# Patient Record
Sex: MALE | Race: WHITE | NOT HISPANIC OR LATINO | Employment: FULL TIME | URBAN - METROPOLITAN AREA
[De-identification: names, ages, dates, MRNs, and addresses within clinical notes are randomized per-mention and may not be internally consistent; named-entity substitution may affect disease eponyms.]

---

## 2011-01-11 LAB — RETINOPATHY: NEGATIVE

## 2017-02-14 ENCOUNTER — OFFICE VISIT (OUTPATIENT)
Dept: FAMILY MEDICINE | Facility: CLINIC | Age: 47
End: 2017-02-14
Payer: COMMERCIAL

## 2017-02-14 VITALS
DIASTOLIC BLOOD PRESSURE: 70 MMHG | TEMPERATURE: 98 F | BODY MASS INDEX: 34.27 KG/M2 | HEART RATE: 90 BPM | WEIGHT: 253 LBS | HEIGHT: 72 IN | SYSTOLIC BLOOD PRESSURE: 122 MMHG | OXYGEN SATURATION: 97 %

## 2017-02-14 DIAGNOSIS — E11.9 TYPE 2 DIABETES MELLITUS WITHOUT COMPLICATION, WITHOUT LONG-TERM CURRENT USE OF INSULIN (H): Primary | ICD-10-CM

## 2017-02-14 DIAGNOSIS — E78.5 HYPERLIPIDEMIA LDL GOAL <100: ICD-10-CM

## 2017-02-14 DIAGNOSIS — J20.9 ACUTE BRONCHITIS WITH SYMPTOMS > 10 DAYS: ICD-10-CM

## 2017-02-14 LAB — HBA1C MFR BLD: 7.6 % (ref 4.3–6)

## 2017-02-14 PROCEDURE — 36415 COLL VENOUS BLD VENIPUNCTURE: CPT | Performed by: NURSE PRACTITIONER

## 2017-02-14 PROCEDURE — 83036 HEMOGLOBIN GLYCOSYLATED A1C: CPT | Performed by: NURSE PRACTITIONER

## 2017-02-14 PROCEDURE — 99214 OFFICE O/P EST MOD 30 MIN: CPT | Performed by: NURSE PRACTITIONER

## 2017-02-14 RX ORDER — SIMVASTATIN 10 MG
10 TABLET ORAL AT BEDTIME
Qty: 90 TABLET | Refills: 3 | Status: SHIPPED | OUTPATIENT
Start: 2017-02-14 | End: 2018-02-19

## 2017-02-14 RX ORDER — AZITHROMYCIN 250 MG/1
TABLET, FILM COATED ORAL
Qty: 6 TABLET | Refills: 0 | Status: SHIPPED | OUTPATIENT
Start: 2017-02-14 | End: 2017-04-30

## 2017-02-14 NOTE — NURSING NOTE
Chief Complaint   Patient presents with     Diabetes     Cough     f/u       Initial /70  Pulse 101  Temp 98  F (36.7  C) (Tympanic)  Ht 6' (1.829 m)  Wt 253 lb (114.8 kg)  SpO2 97%  BMI 34.31 kg/m2 Estimated body mass index is 34.31 kg/(m^2) as calculated from the following:    Height as of this encounter: 6' (1.829 m).    Weight as of this encounter: 253 lb (114.8 kg).  Medication Reconciliation: complete

## 2017-02-14 NOTE — MR AVS SNAPSHOT
After Visit Summary   2/14/2017    Osmar Angel    MRN: [id number]           Patient Information     Date Of Birth          1970        Visit Information        Provider Department      2/14/2017 11:00 AM Cinthya Johnson APRN CNP [facility]        Today's Diagnoses     Type 2 diabetes mellitus without complication, without long-term current use of insulin (H)    -  1    Acute bronchitis with symptoms > 10 days        Hyperlipidemia LDL goal <100          Care Instructions    Labs-today we will notify you with those results    Antibiotics sent to the pharmacy    Follow up if symptoms do not improve or worsen.          Follow-ups after your visit        Who to contact     If you have questions or need follow up information about today's clinic visit or your schedule please contact [facility] directly at [phone].  Normal or non-critical lab and imaging results will be communicated to you by MyChart, letter or phone within 4 business days after the clinic has received the results. If you do not hear from us within 7 days, please contact the clinic through MyChart or phone. If you have a critical or abnormal lab result, we will notify you by phone as soon as possible.  Submit refill requests through zanda or call your pharmacy and they will forward the refill request to us. Please allow 3 business days for your refill to be completed.          Additional Information About Your Visit        MyChart Information     zanda gives you secure access to your electronic health record. If you see a primary care provider, you can also send messages to your care team and make appointments. If you have questions, please call your primary care clinic.  If you do not have a primary care provider, please call [phone] and they will assist you.        Care EveryWhere ID     This is your Care EveryWhere ID. This could be used by other organizations to access  your [place] medical records  [id number]        Your Vitals Were     Pulse Temperature Height Pulse Oximetry BMI (Body Mass Index)       90 98  F (36.7  C) (Tympanic) 6' (1.829 m) 97% 34.31 kg/m2        Blood Pressure from Last 3 Encounters:   02/14/17 122/70   11/28/16 130/80   04/27/16 (!) 141/94    Weight from Last 3 Encounters:   02/14/17 253 lb (114.8 kg)   11/28/16 256 lb (116.1 kg)   10/06/15 267 lb (121.1 kg)              We Performed the Following     Hemoglobin A1c          Today's Medication Changes          These changes are accurate as of: 2/14/17 11:43 AM.  If you have any questions, ask your nurse or doctor.               Start taking these medicines.        Dose/Directions    azithromycin 250 MG tablet   Commonly known as:  ZITHROMAX   Used for:  Acute bronchitis with symptoms > 10 days   Started by:  Cinthya Johnson APRN CNP        Two tablets first day, then one tablet daily for four days.   Quantity:  6 tablet   Refills:  0            Where to get your medicines      These medications were sent to [id number] [address] [place] [address]  [address] [facility] [address]    Hours:  Closed 10-16-08 business to [facility] Phone:  [phone]     azithromycin 250 MG tablet    simvastatin 10 MG tablet                Primary Care Provider Office Phone # Fax #    NIURKA Villarreal [phone]       [facility] [address] [address]        Thank you!     Thank you for choosing [facility]  for your care. Our goal is always to provide you with excellent care. Hearing back from our patients is one way we can continue to improve our services. Please take a few minutes to complete the written survey that you may receive in the mail after your visit with us. Thank you!             Your Updated Medication List - Protect others around you: Learn how to safely use, store and throw away your  medicines at www.disposemymeds.org.          This list is accurate as of: 2/14/17 11:43 AM.  Always use your most recent med list.                   Brand Name Dispense Instructions for use    aspirin 81 MG tablet      Take 1 tablet by mouth daily.       azithromycin 250 MG tablet    ZITHROMAX    6 tablet    Two tablets first day, then one tablet daily for four days.       blood glucose monitoring lancets     102 Box    1 each daily Use to test blood sugar 1 times daily or as directed.       blood glucose monitoring test strip    no brand specified    3 Box    Use to test blood sugar 3 times daily or as directed. Compatible with Corina monitoring device       CINNAMON PO          lisinopril 10 MG tablet    PRINIVIL/ZESTRIL    90 tablet    Take 1 tablet (10 mg) by mouth daily       metFORMIN 1000 MG tablet    GLUCOPHAGE    180 tablet    Take 1 tablet (1,000 mg) by mouth 2 times daily (with meals)       NAPROXEN SODIUM PO      Take 100 mg by mouth       OMEGA-3 FISH OIL PO          order for DME      Equipment being ordered: CPAP Osmar Angle  received a [employer] DreamStation Auto CPAP Auto. Pressures were set at Auto 11 - 15 cm H2O.       simvastatin 10 MG tablet    ZOCOR    90 tablet    Take 1 tablet (10 mg) by mouth At Bedtime       triamcinolone 0.1 % cream    KENALOG    80 g    Apply topically 2 times daily Apply sparingly to affected area three times daily as needed

## 2017-02-14 NOTE — PROGRESS NOTES
SUBJECTIVE:                                                    Osmar Angel is a 46 year old male who presents to clinic today for the following health issues:    Diabetes Follow-up     Patient is checking blood sugars: once daily.  Results are as follows:         118-150 usually checking in the am (150) During day is lower     Diabetic concerns: None     Symptoms of hypoglycemia (low blood sugar): weak and lightheaded      Paresthesias (numbness or burning in feet) or sores: numbness on right toe- hx of accident      Date of last diabetic eye exam: last 6 mo        Amount of exercise or physical activity: 2-3 days/week for an average of 15-30 minutes    Problems taking medications regularly: No    Medication side effects: none  Diet: carbohydrate counting    Has been working on improving diet and increasing physical activity    RESPIRATORY SYMPTOMS      Duration: 3 months     Description  cough - low grade temp yesterday 99.6    Severity: moderate    Accompanying signs and symptoms: SOB with activity      History (predisposing factors):  None - hx of tobacco 12 years     Precipitating or alleviating factors: None    Therapies tried and outcome: musinex      Cough ongoing  Afebrile    Problem list and histories reviewed & adjusted, as indicated.  Additional history: as documented    Patient Active Problem List   Diagnosis     Profound impairment, one eye, impairment level not further specified     Pain in joint, upper arm     External hemorrhoids     Hypertension goal BP (blood pressure) < 140/90     Type 2 diabetes mellitus without complications (H)     Hyperlipidemia LDL goal <100     Obesity, Class II, BMI 35-39.9, with comorbidity (H)     VERONA (obstructive sleep apnea)-AHI 71     Past Surgical History   Procedure Laterality Date     Surgical history of -        right knee arthroscopy     C repair ing hernia, infant,reduc       bilateral     Surgical history of -        umbilical hernia repair      Rotator cuff repair rt/lt       right arthroscopic     Vasectomy         Social History   Substance Use Topics     Smoking status: Former Smoker     Quit date: 2/14/2005     Smokeless tobacco: Never Used     Alcohol use 0.0 oz/week     0 Standard drinks or equivalent per week      Comment: rarely     Family History   Problem Relation Age of Onset     DIABETES Maternal Grandfather      C.A.D. Paternal Grandmother      CMartineA.D. Paternal Grandfather      Cardiovascular Father      a. fib     CEREBROVASCULAR DISEASE Father      HEART DISEASE Father      pacemaker     Asthma No family hx of      Hypertension No family hx of      Breast Cancer No family hx of      Cancer - colorectal No family hx of      Prostate Cancer No family hx of          Current Outpatient Prescriptions   Medication Sig Dispense Refill     simvastatin (ZOCOR) 10 MG tablet Take 1 tablet (10 mg) by mouth At Bedtime 30 tablet 3     metFORMIN (GLUCOPHAGE) 1000 MG tablet Take 1 tablet (1,000 mg) by mouth 2 times daily (with meals) 180 tablet 1     blood glucose monitoring (NO BRAND SPECIFIED) test strip Use to test blood sugar 3 times daily or as directed. Compatible with Corina monitoring device 3 Box 3     lisinopril (PRINIVIL,ZESTRIL) 10 MG tablet Take 1 tablet (10 mg) by mouth daily 90 tablet 3     blood glucose monitoring (ACCU-CHEK FASTCLIX) lancets 1 each daily Use to test blood sugar 1 times daily or as directed. 102 Box 5     triamcinolone (KENALOG) 0.1 % cream Apply topically 2 times daily Apply sparingly to affected area three times daily as needed 80 g 3     order for DME Equipment being ordered: CPAP Osmar Angel  received a [employer] DreamStation Auto CPAP Auto. Pressures were set at Auto 11 - 15 cm H2O.       CINNAMON PO        Omega-3 Fatty Acids (OMEGA-3 FISH OIL PO)        NAPROXEN SODIUM PO Take 100 mg by mouth        aspirin 81 MG tablet Take 1 tablet by mouth daily.       Allergies   Allergen Reactions     Wellbutrin  [Bupropion Hcl] Swelling     Problem list, Medication list, Allergies, and Medical/Social/Surgical histories reviewed in [facility] and updated as appropriate.    ROS:  Constitutional, HEENT, cardiovascular, pulmonary, gi and gu systems are negative, except as otherwise noted.    OBJECTIVE:                                                    /70  Pulse 101  Temp 98  F (36.7  C) (Tympanic)  Ht 6' (1.829 m)  Wt 253 lb (114.8 kg)  SpO2 97%  BMI 34.31 kg/m2  Body mass index is 34.31 kg/(m^2).  GENERAL: healthy, alert and no distress  HENT: ear canals and TM's normal, nose and mouth without ulcers or lesions  NECK: no adenopathy, no asymmetry, masses, or scars and thyroid normal to palpation  RESP: lungs clear to auscultation - no rales, rhonchi or wheezes  CV: regular rate and rhythm, normal S1 S2, no S3 or S4, no murmur, click or rub  ABDOMEN: soft, nontender, no hepatosplenomegaly, no masses and bowel sounds normal  PSYCH: mentation appears normal, affect normal/bright    Diagnostic Test Results:  Results for orders placed or performed in visit on 02/14/17 (from the past 24 hour(s))   Hemoglobin A1c   Result Value Ref Range    Hemoglobin A1C 7.6 (H) 4.3 - 6.0 %        ASSESSMENT/PLAN:                                                      1. Type 2 diabetes mellitus without complication, without long-term current use of insulin (H)  Controlled-continue to work on lifestyle modifications for better control.  - Hemoglobin A1c  - simvastatin (ZOCOR) 10 MG tablet; Take 1 tablet (10 mg) by mouth At Bedtime  Dispense: 90 tablet; Refill: 3    2. Acute bronchitis with symptoms > 10 days  Symptomatic care and follow up discussed  - azithromycin (ZITHROMAX) 250 MG tablet; Two tablets first day, then one tablet daily for four days.  Dispense: 6 tablet; Refill: 0    3. Hyperlipidemia LDL goal <100  Controlled-recheck labs in 3-6 months  - simvastatin (ZOCOR) 10 MG tablet; Take 1 tablet (10 mg) by mouth At Bedtime  Dispense: 90  tablet; Refill: 3    Home care instructions were reviewed with the patient. The risks, benefits and treatment options of prescribed medications or other treatments have been discussed with the patient. The patient verbalized their understanding and should call or follow up if no improvement or if they develop further problems.      Patient Instructions   Labs-today we will notify you with those results    Antibiotics sent to the pharmacy    Follow up if symptoms do not improve or worsen.        NIURKA Escobar [facility]

## 2017-02-14 NOTE — PATIENT INSTRUCTIONS
Labs-today we will notify you with those results    Antibiotics sent to the pharmacy    Follow up if symptoms do not improve or worsen.

## 2017-02-18 ENCOUNTER — MYC MEDICAL ADVICE (OUTPATIENT)
Dept: FAMILY MEDICINE | Facility: CLINIC | Age: 47
End: 2017-02-18

## 2017-03-06 ENCOUNTER — TELEPHONE (OUTPATIENT)
Dept: SLEEP MEDICINE | Facility: CLINIC | Age: 47
End: 2017-03-06

## 2017-03-06 NOTE — TELEPHONE ENCOUNTER
SUBJECTIVE:  Chief Complaint   Patient presents with     Sleep Problem     DOT usage download request      OBJECTIVE:  Osmar called stating he saw [facility]. [facility] for is [place] DOT physical.  Osmar states they sent us a from requiring signature and subsequent compliance with recommended treatment program.     ASSESSMENT/PLAN:  Form not found. I called Dr. Carreon's office at [phone] and requested they fax form again.  Osmar currently uses a Tanya [employer] Dreamstation. I was able to print download for last 30 days.   Last office visit 10/06/2015 **Osmar notified that he may need an annual follow up to get the information they are needing.  Will give to provider for review.

## 2017-03-07 NOTE — TELEPHONE ENCOUNTER
Spoke with patient and informed him that I faxed his DOT download and compliance to [facility] at [phone]. Last office visit was 10/2015 and provider would like to see patient annually as he is a DOT patient. I told patient I would send him a reminder card to have him schedule an annual DOT CPAP follow up to get up to date.    Poly Ayon CMA

## 2017-04-30 ENCOUNTER — OFFICE VISIT (OUTPATIENT)
Dept: URGENT CARE | Facility: URGENT CARE | Age: 47
End: 2017-04-30
Payer: COMMERCIAL

## 2017-04-30 VITALS
BODY MASS INDEX: 34.46 KG/M2 | HEART RATE: 100 BPM | OXYGEN SATURATION: 98 % | HEIGHT: 73 IN | WEIGHT: 260 LBS | DIASTOLIC BLOOD PRESSURE: 82 MMHG | SYSTOLIC BLOOD PRESSURE: 117 MMHG | TEMPERATURE: 97.3 F

## 2017-04-30 DIAGNOSIS — J20.9 ACUTE BRONCHITIS WITH SYMPTOMS > 10 DAYS: Primary | ICD-10-CM

## 2017-04-30 PROCEDURE — 99213 OFFICE O/P EST LOW 20 MIN: CPT | Performed by: NURSE PRACTITIONER

## 2017-04-30 RX ORDER — BENZONATATE 200 MG/1
200 CAPSULE ORAL 3 TIMES DAILY PRN
Qty: 30 CAPSULE | Refills: 0 | Status: SHIPPED | OUTPATIENT
Start: 2017-04-30 | End: 2017-08-30

## 2017-04-30 NOTE — PROGRESS NOTES
SUBJECTIVE:                                                    Osmar Angel is a 46 year old male who presents to clinic today for the following health issues:      ENT Symptoms             Symptoms: cc Present Absent Comment   Fever/Chills   x    Fatigue  x  No energy   Muscle Aches   x    Eye Irritation  x  Some discharge   Sneezing  x     Nasal Joao/Drg  x  Greenish color   Sinus Pressure/Pain   x    Loss of smell   x    Dental pain   x    Sore Throat  x  slight   Swollen Glands   x    Ear Pain/Fullness   x    Cough x      Wheeze  x     Chest Pain   x    Shortness of breath   x    Rash   x    Other         Symptom duration:  1 week, getting worse last 3 days   Symptom severity:     Treatments tried:  Mucinex, Dayquil, Nyquil PRN   Contacts:  none             Problem list and histories reviewed & adjusted, as indicated.  Additional history: as documented    Patient Active Problem List   Diagnosis     Profound impairment, one eye, impairment level not further specified     Pain in joint, upper arm     External hemorrhoids     Hypertension goal BP (blood pressure) < 140/90     Type 2 diabetes mellitus without complications (H)     Hyperlipidemia LDL goal <100     Obesity, Class II, BMI 35-39.9, with comorbidity (H)     VERONA (obstructive sleep apnea)-AHI 71     Past Surgical History:   Procedure Laterality Date     C REPAIR ING HERNIA, INFANT,REDUC      bilateral     ROTATOR CUFF REPAIR RT/LT      right arthroscopic     SURGICAL HISTORY OF -       right knee arthroscopy     SURGICAL HISTORY OF -       umbilical hernia repair     VASECTOMY         Social History   Substance Use Topics     Smoking status: Former Smoker     Quit date: 2005     Smokeless tobacco: Never Used     Alcohol use 0.0 oz/week     0 Standard drinks or equivalent per week      Comment: rarely     Family History   Problem Relation Age of Onset     DIABETES Maternal Grandfather      JULIANNA. Paternal Grandmother      JULIANNA. Paternal  Grandfather      Cardiovascular Father      a. fib     CEREBROVASCULAR DISEASE Father      HEART DISEASE Father      pacemaker     Asthma No family hx of      Hypertension No family hx of      Breast Cancer No family hx of      Cancer - colorectal No family hx of      Prostate Cancer No family hx of          Current Outpatient Prescriptions   Medication Sig Dispense Refill     amoxicillin-clavulanate (AUGMENTIN) 875-125 MG per tablet Take 1 tablet by mouth 2 times daily for 10 days 20 tablet 0     benzonatate (TESSALON) 200 MG capsule Take 1 capsule (200 mg) by mouth 3 times daily as needed for cough 30 capsule 0     simvastatin (ZOCOR) 10 MG tablet Take 1 tablet (10 mg) by mouth At Bedtime 90 tablet 3     metFORMIN (GLUCOPHAGE) 1000 MG tablet Take 1 tablet (1,000 mg) by mouth 2 times daily (with meals) 180 tablet 1     blood glucose monitoring (NO BRAND SPECIFIED) test strip Use to test blood sugar 3 times daily or as directed. Compatible with Corina monitoring device 3 Box 3     lisinopril (PRINIVIL,ZESTRIL) 10 MG tablet Take 1 tablet (10 mg) by mouth daily 90 tablet 3     blood glucose monitoring (ACCU-CHEK FASTCLIX) lancets 1 each daily Use to test blood sugar 1 times daily or as directed. 102 Box 5     triamcinolone (KENALOG) 0.1 % cream Apply topically 2 times daily Apply sparingly to affected area three times daily as needed 80 g 3     order for DME Equipment being ordered: CPAP Osmar Angel  received a [employer]anya [employer] DreamStation Auto CPAP Auto. Pressures were set at Auto 11 - 15 cm H2O.       CINNAMON PO        Omega-3 Fatty Acids (OMEGA-3 FISH OIL PO)        NAPROXEN SODIUM PO Take 100 mg by mouth        aspirin 81 MG tablet Take 1 tablet by mouth daily.       Allergies   Allergen Reactions     Wellbutrin [Bupropion Hcl] Swelling     Labs reviewed in EPIC    Reviewed and updated as needed this visit by clinical staff  Tobacco  Allergies  Meds  Problems  Med Hx  Surg Hx  Fam Hx  Soc Hx       "  Reviewed and updated as needed this visit by Provider  Allergies  Meds  Problems         ROS:  Constitutional, HEENT, cardiovascular, pulmonary, GI, , musculoskeletal, neuro, skin, endocrine and psych systems are negative, except as otherwise noted.    OBJECTIVE:                                                    /82  Pulse 100  Temp 97.3  F (36.3  C) (Tympanic)  Ht 6' 1\" (1.854 m)  Wt 260 lb (117.9 kg)  SpO2 98%  BMI 34.3 kg/m2  Body mass index is 34.3 kg/(m^2).  GENERAL: healthy, alert and no distress, nontoxic in appearance  EYES: Eyes grossly normal to inspection, PERRL and conjunctivae and sclerae normal  HENT: ear canals and TM's normal, nose and mouth without ulcers or lesions  NECK: no adenopathy, supple with full ROM  RESP: lungs clear to auscultation - no rales, rhonchi or wheezes but very deep harsh cough  CV: regular rate and rhythm, normal S1 S2, no S3 or S4, no murmur, click or rub, no peripheral edema   ABDOMEN: soft, nontender  MS: no gross musculoskeletal defects noted, no edema  No rash    Diagnostic Test Results:  No results found for this or any previous visit (from the past 24 hour(s)).     ASSESSMENT/PLAN:                                                      Problem List Items Addressed This Visit     None      Visit Diagnoses     Acute bronchitis with symptoms > 10 days    -  Primary    Relevant Medications    amoxicillin-clavulanate (AUGMENTIN) 875-125 MG per tablet    benzonatate (TESSALON) 200 MG capsule               Patient Instructions   Increase rest and fluids. Tylenol and/or Ibuprofen for comfort. Cool mist vaporizer. If your symptoms worsen or do not resolve follow up with your primary care provider in 2 weeks and sooner if needed.        Indications for emergent return to emergency department discussed with patient, who verbalized good understanding and agreement.  Patient understands the limitations of today's evaluation.           Bronchitis, Antibiotic " Treatment (Adult)    Bronchitis is an infection of the air passages (bronchial tubes) in your lungs. It often occurs when you have a cold. This illness is contagious during the first few days and is spread through the air by coughing and sneezing, or by direct contact (touching the sick person and then touching your own eyes, nose, or mouth).  Symptoms of bronchitis include cough with mucus (phlegm) and low-grade fever. Bronchitis usually lasts 7 to 14 days. Mild cases can be treated with simple home remedies. More severe infection is treated with an antibiotic.  Home care  Follow these guidelines when caring for yourself at home:    If your symptoms are severe, rest at home for the first 2 to 3 days. When you go back to your usual activities, don't let yourself get too tired.    Do not smoke. Also avoid being exposed to secondhand smoke.    You may use over-the-counter medicines to control fever or pain, unless another medicine was prescribed. (Note: If you have chronic liver or kidney disease or have ever had a stomach ulcer or gastrointestinal bleeding, talk with your healthcare provider before using these medicines. Also talk to your provider if you are taking medicine to prevent blood clots.) Aspirin should never be given to anyone younger than 18 years of age who is ill with a viral infection or fever. It may cause severe liver or brain damage.    Your appetite may be poor, so a light diet is fine. Avoid dehydration by drinking 6 to 8 glasses of fluids per day (such as water, soft drinks, sports drinks, juices, tea, or soup). Extra fluids will help loosen secretions in the nose and lungs.    Over-the-counter cough, cold, and sore-throat medicines will not shorten the length of the illness, but they may be helpful to reduce symptoms. (Note: Do not use decongestants if you have high blood pressure.)    Finish all antibiotic medicine. Do this even if you are feeling better after only a few days.  Follow-up  care  Follow up with your healthcare provider, or as advised. If you had an X-ray or ECG (electrocardiogram), a specialist will review it. You will be notified of any new findings that may affect your care.  Note: If you are age 65 or older, or if you have a chronic lung disease or condition that affects your immune system, or you smoke, talk to your healthcare provider about having pneumococcal vaccinations and a yearly influenza vaccination (flu shot).  When to seek medical advice  Call your healthcare provider right away if any of these occur:    Fever of 100.4 F (38 C) or higher    Coughing up increased amounts of colored sputum    Weakness, drowsiness, headache, facial pain, ear pain, or a stiff neck   Call 911, or get immediate medical care  Contact emergency services right away if any of these occur.    Coughing up blood    Worsening weakness, drowsiness, headache, or stiff neck    Trouble breathing, wheezing, or pain with breathing    [id number] [email or web address]. [address] [place] [address], [place] [address]. All rights reserved. This information is not intended as a substitute for professional medical care. Always follow your healthcare professional's instructions.            NIURKA Phipps [facility]

## 2017-04-30 NOTE — NURSING NOTE
"Chief Complaint   Patient presents with     Cough       Initial /82  Pulse 100  Temp 97.3  F (36.3  C) (Tympanic)  Ht 6' 1\" (1.854 m)  Wt 260 lb (117.9 kg)  SpO2 98%  BMI 34.3 kg/m2 Estimated body mass index is 34.3 kg/(m^2) as calculated from the following:    Height as of this encounter: 6' 1\" (1.854 m).    Weight as of this encounter: 260 lb (117.9 kg).  Medication Reconciliation: complete   NORA Madrigal      "

## 2017-04-30 NOTE — MR AVS SNAPSHOT
After Visit Summary   4/30/2017    Osmar Angel    MRN: [id number]           Patient Information     Date Of Birth          1970        Visit Information        Provider Department      4/30/2017 10:40 AM Jessi Chirinos APRN [facility]        Today's Diagnoses     Acute bronchitis with symptoms > 10 days    -  1      Care Instructions    Increase rest and fluids. Tylenol and/or Ibuprofen for comfort. Cool mist vaporizer. If your symptoms worsen or do not resolve follow up with your primary care provider in 2 weeks and sooner if needed.        Indications for emergent return to emergency department discussed with patient, who verbalized good understanding and agreement.  Patient understands the limitations of today's evaluation.           Bronchitis, Antibiotic Treatment (Adult)    Bronchitis is an infection of the air passages (bronchial tubes) in your lungs. It often occurs when you have a cold. This illness is contagious during the first few days and is spread through the air by coughing and sneezing, or by direct contact (touching the sick person and then touching your own eyes, nose, or mouth).  Symptoms of bronchitis include cough with mucus (phlegm) and low-grade fever. Bronchitis usually lasts 7 to 14 days. Mild cases can be treated with simple home remedies. More severe infection is treated with an antibiotic.  Home care  Follow these guidelines when caring for yourself at home:    If your symptoms are severe, rest at home for the first 2 to 3 days. When you go back to your usual activities, don't let yourself get too tired.    Do not smoke. Also avoid being exposed to secondhand smoke.    You may use over-the-counter medicines to control fever or pain, unless another medicine was prescribed. (Note: If you have chronic liver or kidney disease or have ever had a stomach ulcer or gastrointestinal bleeding, talk with your healthcare provider  before using these medicines. Also talk to your provider if you are taking medicine to prevent blood clots.) Aspirin should never be given to anyone younger than 18 years of age who is ill with a viral infection or fever. It may cause severe liver or brain damage.    Your appetite may be poor, so a light diet is fine. Avoid dehydration by drinking 6 to 8 glasses of fluids per day (such as water, soft drinks, sports drinks, juices, tea, or soup). Extra fluids will help loosen secretions in the nose and lungs.    Over-the-counter cough, cold, and sore-throat medicines will not shorten the length of the illness, but they may be helpful to reduce symptoms. (Note: Do not use decongestants if you have high blood pressure.)    Finish all antibiotic medicine. Do this even if you are feeling better after only a few days.  Follow-up care  Follow up with your healthcare provider, or as advised. If you had an X-ray or ECG (electrocardiogram), a specialist will review it. You will be notified of any new findings that may affect your care.  Note: If you are age 65 or older, or if you have a chronic lung disease or condition that affects your immune system, or you smoke, talk to your healthcare provider about having pneumococcal vaccinations and a yearly influenza vaccination (flu shot).  When to seek medical advice  Call your healthcare provider right away if any of these occur:    Fever of 100.4 F (38 C) or higher    Coughing up increased amounts of colored sputum    Weakness, drowsiness, headache, facial pain, ear pain, or a stiff neck   Call 911, or get immediate medical care  Contact emergency services right away if any of these occur.    Coughing up blood    Worsening weakness, drowsiness, headache, or stiff neck    Trouble breathing, wheezing, or pain with breathing    1540-8925 [employer]. [address] [place] [address], [place] [address]. All rights reserved. This information is not intended as a substitute for  "professional medical care. Always follow your healthcare professional's instructions.              Follow-ups after your visit        Follow-up notes from your care team     See patient instructions section of the AVS Return in about 2 weeks (around 5/14/2017), or if symptoms worsen or fail to improve, for Follow up with your primary care provider.      Who to contact     If you have questions or need follow up information about today's clinic visit or your schedule please contact [facility] directly at [phone].  Normal or non-critical lab and imaging results will be communicated to you by [employer], letter or phone within 4 business days after the clinic has received the results. If you do not hear from us within 7 days, please contact the clinic through [employer] or phone. If you have a critical or abnormal lab result, we will notify you by phone as soon as possible.  Submit refill requests through [employer] or call your pharmacy and they will forward the refill request to us. Please allow 3 business days for your refill to be completed.          Additional Information About Your Visit        [employer] Information     [employer] gives you secure access to your electronic health record. If you see a primary care provider, you can also send messages to your care team and make appointments. If you have questions, please call your primary care clinic.  If you do not have a primary care provider, please call [phone] and they will assist you.        Care EveryWhere ID     This is your Care EveryWhere ID. This could be used by other organizations to access your [facility] medical records  [id number]        Your Vitals Were     Pulse Temperature Height Pulse Oximetry BMI (Body Mass Index)       100 97.3  F (36.3  C) (Tympanic) 6' 1\" (1.854 m) 98% 34.3 kg/m2        Blood Pressure from Last 3 Encounters:   04/30/17 117/82   02/14/17 122/70   11/28/16 130/80    Weight from Last 3 Encounters: "   04/30/17 260 lb (117.9 kg)   02/14/17 253 lb (114.8 kg)   11/28/16 256 lb (116.1 kg)              Today, you had the following     No orders found for display         Today's Medication Changes          These changes are accurate as of: 4/30/17  1:24 PM.  If you have any questions, ask your nurse or doctor.               Start taking these medicines.        Dose/Directions    amoxicillin-clavulanate 875-125 MG per tablet   Commonly known as:  AUGMENTIN   Used for:  Acute bronchitis with symptoms > 10 days   Started by:  Jessi Chirinos APRN CNP        Dose:  1 tablet   Take 1 tablet by mouth 2 times daily for 10 days   Quantity:  20 tablet   Refills:  0       benzonatate 200 MG capsule   Commonly known as:  TESSALON   Used for:  Acute bronchitis with symptoms > 10 days   Started by:  Jessi Chirinos APRN CNP        Dose:  200 mg   Take 1 capsule (200 mg) by mouth 3 times daily as needed for cough   Quantity:  30 capsule   Refills:  0            Where to get your medicines      These medications were sent to [id number] [address] [facility]  [address] [facility] [address]    Hours:  Closed 10-16-08 business to [facility] Phone:  [phone]     amoxicillin-clavulanate 875-125 MG per tablet    benzonatate 200 MG capsule                Primary Care Provider Office Phone # Fax #    Cinthya NIURKA Franklin [phone]       [facility] [address] [address]        Thank you!     Thank you for choosing [facility]  for your care. Our goal is always to provide you with excellent care. Hearing back from our patients is one way we can continue to improve our services. Please take a few minutes to complete the written survey that you may receive in the mail after your visit with us. Thank you!             Your Updated Medication List - Protect others around you: Learn how to safely use,  store and throw away your medicines at www.disposemymeds.org.          This list is accurate as of: 4/30/17  1:24 PM.  Always use your most recent med list.                   Brand Name Dispense Instructions for use    amoxicillin-clavulanate 875-125 MG per tablet    AUGMENTIN    20 tablet    Take 1 tablet by mouth 2 times daily for 10 days       aspirin 81 MG tablet      Take 1 tablet by mouth daily.       benzonatate 200 MG capsule    TESSALON    30 capsule    Take 1 capsule (200 mg) by mouth 3 times daily as needed for cough       blood glucose monitoring lancets     102 Box    1 each daily Use to test blood sugar 1 times daily or as directed.       blood glucose monitoring test strip    no brand specified    3 Box    Use to test blood sugar 3 times daily or as directed. Compatible with [employer] monitoring device       CINNAMON PO          lisinopril 10 MG tablet    PRINIVIL/ZESTRIL    90 tablet    Take 1 tablet (10 mg) by mouth daily       metFORMIN 1000 MG tablet    GLUCOPHAGE    180 tablet    Take 1 tablet (1,000 mg) by mouth 2 times daily (with meals)       NAPROXEN SODIUM PO      Take 100 mg by mouth       OMEGA-3 FISH OIL PO          order for DME      Equipment being ordered: CPAP Osmar Angel  received a [employer] DreamStation Auto CPAP Auto. Pressures were set at Auto 11 - 15 cm H2O.       simvastatin 10 MG tablet    ZOCOR    90 tablet    Take 1 tablet (10 mg) by mouth At Bedtime       triamcinolone 0.1 % cream    KENALOG    80 g    Apply topically 2 times daily Apply sparingly to affected area three times daily as needed

## 2017-04-30 NOTE — PATIENT INSTRUCTIONS
Increase rest and fluids. Tylenol and/or Ibuprofen for comfort. Cool mist vaporizer. If your symptoms worsen or do not resolve follow up with your primary care provider in 2 weeks and sooner if needed.        Indications for emergent return to emergency department discussed with patient, who verbalized good understanding and agreement.  Patient understands the limitations of today's evaluation.           Bronchitis, Antibiotic Treatment (Adult)    Bronchitis is an infection of the air passages (bronchial tubes) in your lungs. It often occurs when you have a cold. This illness is contagious during the first few days and is spread through the air by coughing and sneezing, or by direct contact (touching the sick person and then touching your own eyes, nose, or mouth).  Symptoms of bronchitis include cough with mucus (phlegm) and low-grade fever. Bronchitis usually lasts 7 to 14 days. Mild cases can be treated with simple home remedies. More severe infection is treated with an antibiotic.  Home care  Follow these guidelines when caring for yourself at home:    If your symptoms are severe, rest at home for the first 2 to 3 days. When you go back to your usual activities, don't let yourself get too tired.    Do not smoke. Also avoid being exposed to secondhand smoke.    You may use over-the-counter medicines to control fever or pain, unless another medicine was prescribed. (Note: If you have chronic liver or kidney disease or have ever had a stomach ulcer or gastrointestinal bleeding, talk with your healthcare provider before using these medicines. Also talk to your provider if you are taking medicine to prevent blood clots.) Aspirin should never be given to anyone younger than 18 years of age who is ill with a viral infection or fever. It may cause severe liver or brain damage.    Your appetite may be poor, so a light diet is fine. Avoid dehydration by drinking 6 to 8 glasses of fluids per day (such as water, soft  drinks, sports drinks, juices, tea, or soup). Extra fluids will help loosen secretions in the nose and lungs.    Over-the-counter cough, cold, and sore-throat medicines will not shorten the length of the illness, but they may be helpful to reduce symptoms. (Note: Do not use decongestants if you have high blood pressure.)    Finish all antibiotic medicine. Do this even if you are feeling better after only a few days.  Follow-up care  Follow up with your healthcare provider, or as advised. If you had an X-ray or ECG (electrocardiogram), a specialist will review it. You will be notified of any new findings that may affect your care.  Note: If you are age 65 or older, or if you have a chronic lung disease or condition that affects your immune system, or you smoke, talk to your healthcare provider about having pneumococcal vaccinations and a yearly influenza vaccination (flu shot).  When to seek medical advice  Call your healthcare provider right away if any of these occur:    Fever of 100.4 F (38 C) or higher    Coughing up increased amounts of colored sputum    Weakness, drowsiness, headache, facial pain, ear pain, or a stiff neck   Call 911, or get immediate medical care  Contact emergency services right away if any of these occur.    Coughing up blood    Worsening weakness, drowsiness, headache, or stiff neck    Trouble breathing, wheezing, or pain with breathing    5429-6930 [employer]. [address] [place] [address]. All rights reserved. This information is not intended as a substitute for professional medical care. Always follow your healthcare professional's instructions.

## 2017-05-22 DIAGNOSIS — E11.9 TYPE 2 DIABETES MELLITUS WITHOUT COMPLICATION, UNSPECIFIED LONG TERM INSULIN USE STATUS: ICD-10-CM

## 2017-05-22 NOTE — TELEPHONE ENCOUNTER
Metformin 1000 mg         Last Written Prescription Date: 11/28/16  Last Fill Quantity: 180, # refills: 1  Last Office Visit with [facility] or [facility] prescribing provider:  2/14/17        BP Readings from Last 3 Encounters:   04/30/17 117/82   02/14/17 122/70   11/28/16 130/80     Lab Results   Component Value Date    MICROL 42 11/28/2016     Lab Results   Component Value Date    UMALCR 29.72 11/28/2016     Creatinine   Date Value Ref Range Status   11/28/2016 1.01 0.66 - 1.25 mg/dL Final   ]  GFR Estimate   Date Value Ref Range Status   11/28/2016 79 >60 mL/min/1.7m2 Final     Comment:     Non  GFR Calc   09/14/2015 89 >60 mL/min/1.7m2 Final     Comment:     Non  GFR Calc   09/17/2014 82 >60 mL/min/1.7m2 Final     Comment:     Non  GFR Calc     GFR Estimate If Black   Date Value Ref Range Status   11/28/2016 >90   GFR Calc   >60 mL/min/1.7m2 Final   09/14/2015 >90   GFR Calc   >60 mL/min/1.7m2 Final   09/17/2014 >90   GFR Calc   >60 mL/min/1.7m2 Final     Lab Results   Component Value Date    CHOL 132 11/28/2016     Lab Results   Component Value Date    HDL 44 11/28/2016     Lab Results   Component Value Date    LDL 61 11/28/2016     Lab Results   Component Value Date    TRIG 134 11/28/2016     Lab Results   Component Value Date    CHOLHDLRATIO 2.9 09/14/2015     Lab Results   Component Value Date    AST 22 11/28/2016     Lab Results   Component Value Date    ALT 34 11/28/2016     Lab Results   Component Value Date    A1C 7.6 02/14/2017    A1C 8.0 11/28/2016    A1C 6.7 09/14/2015    A1C 7.0 09/17/2014    A1C 6.2 09/11/2013     Potassium   Date Value Ref Range Status   11/28/2016 4.2 3.4 - 5.3 mmol/L Final

## 2017-07-18 ENCOUNTER — APPOINTMENT (OUTPATIENT)
Dept: OCCUPATIONAL MEDICINE | Facility: CLINIC | Age: 47
End: 2017-07-18

## 2017-07-18 PROCEDURE — 82075 ASSAY OF BREATH ETHANOL: CPT | Performed by: PHYSICIAN ASSISTANT

## 2017-07-18 PROCEDURE — 99000 SPECIMEN HANDLING OFFICE-LAB: CPT | Performed by: PHYSICIAN ASSISTANT

## 2017-08-30 ENCOUNTER — OFFICE VISIT (OUTPATIENT)
Dept: URGENT CARE | Facility: URGENT CARE | Age: 47
End: 2017-08-30
Payer: COMMERCIAL

## 2017-08-30 VITALS
WEIGHT: 270.8 LBS | SYSTOLIC BLOOD PRESSURE: 139 MMHG | HEART RATE: 90 BPM | DIASTOLIC BLOOD PRESSURE: 86 MMHG | BODY MASS INDEX: 35.73 KG/M2 | OXYGEN SATURATION: 96 % | TEMPERATURE: 97.7 F

## 2017-08-30 DIAGNOSIS — H65.02 ACUTE SEROUS OTITIS MEDIA OF LEFT EAR, RECURRENCE NOT SPECIFIED: ICD-10-CM

## 2017-08-30 DIAGNOSIS — H60.332 SWIMMER'S EAR OF LEFT SIDE, UNSPECIFIED CHRONICITY: Primary | ICD-10-CM

## 2017-08-30 PROCEDURE — 99213 OFFICE O/P EST LOW 20 MIN: CPT | Performed by: NURSE PRACTITIONER

## 2017-08-30 RX ORDER — CIPROFLOXACIN AND DEXAMETHASONE 3; 1 MG/ML; MG/ML
4 SUSPENSION/ DROPS AURICULAR (OTIC) 2 TIMES DAILY
Qty: 7.5 ML | Refills: 0 | Status: SHIPPED | OUTPATIENT
Start: 2017-08-30 | End: 2017-09-06

## 2017-08-30 NOTE — MR AVS SNAPSHOT
After Visit Summary   8/30/2017    Osmar Angel    MRN: [id number]           Patient Information     Date Of Birth          1970        Visit Information        Provider Department      8/30/2017 7:55 PM Jessi Chirinos APRN [facility]        Today's Diagnoses     Swimmer's ear of left side, unspecified chronicity    -  1    Acute serous otitis media of left ear, recurrence not specified          Care Instructions    Increase rest and fluids. Tylenol and/or Ibuprofen for comfort. Cool mist vaporizer. If your symptoms worsen or do not resolve follow up with your primary care provider in 1-2 weeks and sooner if needed.     If your ear becomes more painful and swollen you need to be seen immediately as you are at a greater risk for infection being a diabetic.       Indications for emergent return to emergency department discussed with patient, who verbalized good understanding and agreement.  Patient understands the limitations of today's evaluation.           Otitis Media (Middle-Ear Infection) in Adults  Otitis media is another name for a middle-ear infection. It means an infection behind your eardrum. This kind of ear infection can happen after any condition that keeps fluid from draining from the middle ear. These conditions include allergies, a cold, a sore throat, or a respiratory infection.  Middle-ear infections are common in children, but they can also happen in adults. An ear infection in an adult may mean a more serious problem than in a child. So you may need additional tests. If you have an ear infection, you should see your health care provider for treatment.  What are the types of middle-ear infections?  Infections can affect the middle ear in several ways. They are:    Acute otitis media. This middle-ear infection occurs suddenly. It causes swelling and redness. Fluid and mucus become trapped inside the ear. You can have a fever and ear  pain.    Otitis media with effusion. Fluid (effusion) and mucus build up in the middle ear after the infection goes away. You may feel like your middle ear is full. This can continue for months and may affect your hearing.    Chronic otitis media with effusion. Fluid (effusion) remains in the middle ear for a long time. Or it builds up again and again, even though there is no infection. This type of middle-ear infection may be hard to treat. It may also affect your hearing.  Who is more likely to get a middle-ear infection?  You are more likely to get an ear infection if you:    Smoke or are around someone who smokes    Have seasonal or year-round allergy symptoms    Have a cold or other upper respiratory infection  What causes a middle-ear infection?  The middle ear connects to the throat by a canal called the eustachian tube. This tube helps even out the pressure between the outer ear and the inner ear. A cold or allergy can irritate the tube or cause the area around it to swell. This can keep fluid from draining from the middle ear. The fluid builds up behind the eardrum. Bacteria and viruses can grow in this fluid. The bacteria and viruses cause the middle-ear infection.  What are the symptoms of a middle-ear infection?  Common symptoms of a middle-ear infection in adults are:    Pain in 1 or both ears    Drainage from the ear    Muffled hearing    Sore throat   You may also have a fever. Rarely, your balance can be affected.  These symptoms may be the same as for other conditions. It s important to talk with your health care provider if you think you have a middle-ear infection. If you have a high fever, severe pain behind your ear, or paralysis in your face, see your provider as soon as you can.  How is a middle-ear infection diagnosed?  Your health care provider will take a medical history and do a physical exam. He or she will look at the outer ear and eardrum with an otoscope. The otoscope is a lighted tool  that lets your provider see inside the ear. A pneumatic otoscope blows a puff of air into the ear to check how well your eardrum moves. If you eardrum doesn t move well, it may mean you have fluid behind it.  Your provider may also do a test called tympanometry. This test tells how well the middle ear is working. It can find any changes in pressure in the middle ear. Your provider may test your hearing with a tuning fork.  How is a middle-ear infection treated?  A middle-ear infection may be treated with:    Antibiotics, taken by mouth or as ear drops    Medication for pain    Decongestants, antihistamines, or nasal steroids  Your health care provider may also have you try autoinsufflation. This helps adjust the air pressure in your ear. For this, you pinch your nose and gently exhale. This forces air back through the eustachian tube.  The exact treatment for your ear infection will depend on the type of infection you have. In general, if your symptoms don t get better in 48 to 72 hours, contact your health care provider.  Middle-ear infections can cause long-term problems if not treated. They can lead to:    Infection in other parts of the head    Permanent hearing loss    Paralysis of a nerve in your face  If you have a middle-ear infection that doesn t get better, you may need to see an ear, nose, and throat specialist (otolaryngologist). You may need a CT scan or MRI to check for head and neck cancer.  Ear tubes  Sometimes fluid stays in the middle ear even after you take antibiotics and the infection goes away. In this case, your health care provider may suggest that a small tube be placed in your ear. The tube is put at the opening of the eardrum. The tube keeps fluid from building up and relieves pressure in the middle ear. It can also help you hear better. This surgery is called myringotomy. It is not often done in adults.  The tubes usually fall out on their own after 6 months to a year.    5431-6899 [employer]. [address] [place] [address], [place] [address]. All rights reserved. This information is not intended as a substitute for professional medical care. Always follow your healthcare professional's instructions.        External Ear Infection (Adult)    External otitis (also called  swimmer s ear ) is an infection in the ear canal. It is often caused by bacteria or fungus. It can occur a few days after water gets trapped in the ear canal (from swimming or bathing). It can also occur after cleaning too deeply in the ear canal with a cotton swab or other object. Sometimes, hair care products get into the ear canal and cause this problem.  Symptoms can include pain, fever, itching, redness, drainage, or swelling of the ear canal. Temporary hearing loss may also occur.  Home care    Do not try to clean the ear canal. This can push pus and bacteria deeper into the canal.    Use prescribed ear drops as directed. These help reduce swelling and fight the infection. If an ear wick was placed in the ear canal, apply drops right onto the end of the wick. The wick will draw the medication into the ear canal even if it is swollen closed.    A cotton ball may be loosely placed in the outer ear to absorb any drainage.    You may use acetaminophen or ibuprofen to control pain, unless another medication was prescribed. Note: If you have chronic liver or kidney disease or ever had a stomach ulcer or GI bleeding, talk to your health care provider before taking any of these medications.    Do not allow water to get into your ear when bathing. Also, avoid swimming until the infection has cleared.  Prevention    Keep your ears dry. This helps lower the risk of infection. Dry your ears with a towel or hair dryer after getting wet. Also, use ear plugs when swimming.    Do not stick any objects in the ear to remove wax.    If you feel water trapped in your ear, use ear drops right away. You can get these drops over the counter  at most drugstores. They work by removing water from the ear canal.  Follow-up care  Follow up with your health care provider in one week, or as advised.  When to seek medical advice  Call your health care provider right away if any of these occur:    Ear pain becomes worse or doesn t improve after 3 days of treatment    Redness or swelling of the outer ear occurs or gets worse    Headache    Painful or stiff neck    Drowsiness or confusion    Fever of 100.4 F (38 C) or higher, or as directed by your health care provider    Seizure  Date Last Reviewed: 3/22/2015    7289-8668 [employer]. [address] [place] [address]. All rights reserved. This information is not intended as a substitute for professional medical care. Always follow your healthcare professional's instructions.                Follow-ups after your visit        Follow-up notes from your care team     See patient instructions section of the AVS Return in about 2 weeks (around 9/13/2017) for Follow up with your primary care provider.      Who to contact     If you have questions or need follow up information about today's clinic visit or your schedule please contact [facility] directly at [phone].  Normal or non-critical lab and imaging results will be communicated to you by MyChart, letter or phone within 4 business days after the clinic has received the results. If you do not hear from us within 7 days, please contact the clinic through [employer] or phone. If you have a critical or abnormal lab result, we will notify you by phone as soon as possible.  Submit refill requests through [employer] or call your pharmacy and they will forward the refill request to us. Please allow 3 business days for your refill to be completed.          Additional Information About Your Visit        MyChart Information     [employer] gives you secure access to your electronic health record. If you see a primary care  provider, you can also send messages to your care team and make appointments. If you have questions, please call your primary care clinic.  If you do not have a primary care provider, please call [phone] and they will assist you.        Care EveryWhere ID     This is your Care EveryWhere ID. This could be used by other organizations to access your [place] medical records  [id number]        Your Vitals Were     Pulse Temperature Pulse Oximetry BMI (Body Mass Index)          90 97.7  F (36.5  C) (Tympanic) 96% 35.73 kg/m2         Blood Pressure from Last 3 Encounters:   08/30/17 139/86   04/30/17 117/82   02/14/17 122/70    Weight from Last 3 Encounters:   08/30/17 270 lb 12.8 oz (122.8 kg)   04/30/17 260 lb (117.9 kg)   02/14/17 253 lb (114.8 kg)              Today, you had the following     No orders found for display         Today's Medication Changes          These changes are accurate as of: 8/30/17  8:17 PM.  If you have any questions, ask your nurse or doctor.               Start taking these medicines.        Dose/Directions    amoxicillin-clavulanate 875-125 MG per tablet   Commonly known as:  AUGMENTIN   Used for:  Swimmer's ear of left side, unspecified chronicity, Acute serous otitis media of left ear, recurrence not specified   Started by:  Jessi Chirinos APRN CNP        Dose:  1 tablet   Take 1 tablet by mouth 2 times daily for 10 days   Quantity:  20 tablet   Refills:  0       ciprofloxacin-dexamethasone otic suspension   Commonly known as:  CIPRODEX   Used for:  Swimmer's ear of left side, unspecified chronicity   Started by:  Jessi Chirinos APRN CNP        Dose:  4 drop   Place 4 drops Into the left ear 2 times daily for 7 days   Quantity:  7.5 mL   Refills:  0            Where to get your medicines      These medications were sent to [id number] - [place], [place] - [address]  [address] [address]    Hours:  Closed 10-16-08 business to  [facility] Phone:  [phone]     amoxicillin-clavulanate 875-125 MG per tablet    ciprofloxacin-dexamethasone otic suspension                Primary Care Provider Office Phone # Fax #    NIURKA Villarreal [phone] [address] [facility] [address]        Equal Access to Services     BRIA MICHELLE : Hadii aad ku hadasho Soomaali, waaxda luqadaha, qaybta kaalmada adeegyada, waxay jose ain hayaan adealbania khradhajennifer sahni . So [facility] [phone].    ATENCIÓN: Si habla español, tiene a berger disposición servicios gratuitos de asistencia lingüística. Llame al [phone].    We comply with applicable federal civil rights laws and [place] laws. We do not discriminate on the basis of race, color, national origin, age, disability sex, sexual orientation or gender identity.            Thank you!     Thank you for choosing [facility]  for your care. Our goal is always to provide you with excellent care. Hearing back from our patients is one way we can continue to improve our services. Please take a few minutes to complete the written survey that you may receive in the mail after your visit with us. Thank you!             Your Updated Medication List - Protect others around you: Learn how to safely use, store and throw away your medicines at www.disposemymeds.org.          This list is accurate as of: 8/30/17  8:17 PM.  Always use your most recent med list.                   Brand Name Dispense Instructions for use Diagnosis    amoxicillin-clavulanate 875-125 MG per tablet    AUGMENTIN    20 tablet    Take 1 tablet by mouth 2 times daily for 10 days    Swimmer's ear of left side, unspecified chronicity, Acute serous otitis media of left ear, recurrence not specified       aspirin 81 MG tablet      Take 1 tablet by mouth daily.        blood glucose monitoring lancets     102 Box    1 each daily Use to test blood sugar 1 times daily or as directed.     Type 2 diabetes mellitus without complication, unspecified long term insulin use status (H)       blood glucose monitoring test strip    no brand specified    3 Box    Use to test blood sugar 3 times daily or as directed. Compatible with [employer] monitoring device    Type 2 diabetes mellitus without complication, unspecified long term insulin use status (H)       CINNAMON PO           ciprofloxacin-dexamethasone otic suspension    CIPRODEX    7.5 mL    Place 4 drops Into the left ear 2 times daily for 7 days    Swimmer's ear of left side, unspecified chronicity       lisinopril 10 MG tablet    PRINIVIL/ZESTRIL    90 tablet    Take 1 tablet (10 mg) by mouth daily    Hypertension goal BP (blood pressure) < 140/90, Type 2 diabetes mellitus without complication, unspecified long term insulin use status (H)       metFORMIN 1000 MG tablet    GLUCOPHAGE    180 tablet    Take 1 tablet (1,000 mg) by mouth 2 times daily (with meals)    Type 2 diabetes mellitus without complication, unspecified long term insulin use status (H)       NAPROXEN SODIUM PO      Take 100 mg by mouth        OMEGA-3 FISH OIL PO           order for DME      Equipment being ordered: CPAP Osmar Angel  received a [employer] DreamStation Auto CPAP Auto. Pressures were set at Auto 11 - 15 cm H2O.        simvastatin 10 MG tablet    ZOCOR    90 tablet    Take 1 tablet (10 mg) by mouth At Bedtime    Hyperlipidemia LDL goal <100, Type 2 diabetes mellitus without complication, without long-term current use of insulin (H)       triamcinolone 0.1 % cream    KENALOG    80 g    Apply topically 2 times daily Apply sparingly to affected area three times daily as needed    Dermatitis       VITAMIN D (CHOLECALCIFEROL) PO      Take by mouth daily

## 2017-08-31 NOTE — PROGRESS NOTES
SUBJECTIVE:   Osmar Angel is a 47 year old male who presents to clinic today for the following health issues:      Chief Complaint   Patient presents with     Otalgia     left ear, noticed it on , thought it was a blackhead forming, gotten tender and swollen, taking ibuprofen            Problem list and histories reviewed & adjusted, as indicated.  Additional history: as documented    Patient Active Problem List   Diagnosis     Profound impairment, one eye, impairment level not further specified     Pain in joint, upper arm     External hemorrhoids     Hypertension goal BP (blood pressure) < 140/90     Type 2 diabetes mellitus without complications (H)     Hyperlipidemia LDL goal <100     Obesity, Class II, BMI 35-39.9, with comorbidity     VERONA (obstructive sleep apnea)-AHI 71     Past Surgical History:   Procedure Laterality Date     C REPAIR ING HERNIA, INFANT,REDUC      bilateral     ROTATOR CUFF REPAIR RT/LT      right arthroscopic     SURGICAL HISTORY OF -       right knee arthroscopy     SURGICAL HISTORY OF -       umbilical hernia repair     VASECTOMY         Social History   Substance Use Topics     Smoking status: Former Smoker     Quit date: 2005     Smokeless tobacco: Never Used     Alcohol use 0.0 oz/week     0 Standard drinks or equivalent per week      Comment: rarely     Family History   Problem Relation Age of Onset     DIABETES Maternal Grandfather      C.A.D. Paternal Grandmother      C.A.D. Paternal Grandfather      Cardiovascular Father      a. fib     CEREBROVASCULAR DISEASE Father      HEART DISEASE Father      pacemaker     Asthma No family hx of      Hypertension No family hx of      Breast Cancer No family hx of      Cancer - colorectal No family hx of      Prostate Cancer No family hx of          Current Outpatient Prescriptions   Medication Sig Dispense Refill     VITAMIN D, CHOLECALCIFEROL, PO Take by mouth daily       amoxicillin-clavulanate (AUGMENTIN) 284-033  MG per tablet Take 1 tablet by mouth 2 times daily for 10 days 20 tablet 0     ciprofloxacin-dexamethasone (CIPRODEX) otic suspension Place 4 drops Into the left ear 2 times daily for 7 days 7.5 mL 0     metFORMIN (GLUCOPHAGE) 1000 MG tablet Take 1 tablet (1,000 mg) by mouth 2 times daily (with meals) 180 tablet 0     simvastatin (ZOCOR) 10 MG tablet Take 1 tablet (10 mg) by mouth At Bedtime 90 tablet 3     blood glucose monitoring (NO BRAND SPECIFIED) test strip Use to test blood sugar 3 times daily or as directed. Compatible with [employer] monitoring device 3 Box 3     lisinopril (PRINIVIL,ZESTRIL) 10 MG tablet Take 1 tablet (10 mg) by mouth daily 90 tablet 3     blood glucose monitoring (ACCU-CHEK FASTCLIX) lancets 1 each daily Use to test blood sugar 1 times daily or as directed. 102 Box 5     order for DME Equipment being ordered: CPAP Osmar Angel  received a [employer] DreamStation Auto CPAP Auto. Pressures were set at Auto 11 - 15 cm H2O.       CINNAMON PO        Omega-3 Fatty Acids (OMEGA-3 FISH OIL PO)        aspirin 81 MG tablet Take 1 tablet by mouth daily.       triamcinolone (KENALOG) 0.1 % cream Apply topically 2 times daily Apply sparingly to affected area three times daily as needed (Patient not taking: Reported on 8/30/2017) 80 g 3     NAPROXEN SODIUM PO Take 100 mg by mouth        Allergies   Allergen Reactions     Wellbutrin [Bupropion Hcl] Swelling     Labs reviewed in EPIC      Reviewed and updated as needed this visit by clinical staffTobacco  Allergies  Meds  Problems  Med Hx  Surg Hx  Fam Hx  Soc Hx        Reviewed and updated as needed this visit by Provider  Allergies  Meds  Problems         ROS:  Constitutional, HEENT, cardiovascular, pulmonary, GI, , musculoskeletal, neuro, skin, endocrine and psych systems are negative, except as otherwise noted.      OBJECTIVE:   /86  Pulse 90  Temp 97.7  F (36.5  C) (Tympanic)  Wt 270 lb 12.8 oz (122.8 kg)  SpO2 96%  BMI  35.73 kg/m2  Body mass index is 35.73 kg/(m^2).   GENERAL: healthy, alert and no distress, nontoxic in appearance  EYES: Eyes grossly normal to inspection, PERRL and conjunctivae and sclerae normal  HENT: right ear canal normal and left mildly swollen and red with little discharge and TM's intact bilaterally with right normal and left pink, nose and mouth without ulcers or lesions  NECK: no adenopathy, supple with full ROM  RESP: lungs clear to auscultation - no rales, rhonchi or wheezes  CV: regular rate and rhythm, normal S1 S2, no S3 or S4, no murmur, click or rub, no peripheral edema  ABDOMEN: soft, nontender,  MS: no gross musculoskeletal defects noted, no edema  No rash    Diagnostic Test Results:  No results found for this or any previous visit (from the past 24 hour(s)).    ASSESSMENT/PLAN:   Im using both oral antibiotic and ear drops as he is diabetic and I want to make sure this does not go into malignant otitis media. He knows if it gets worse he is to be seen immediately.  Problem List Items Addressed This Visit     None      Visit Diagnoses     Swimmer's ear of left side, unspecified chronicity    -  Primary    Relevant Medications    amoxicillin-clavulanate (AUGMENTIN) 875-125 MG per tablet    ciprofloxacin-dexamethasone (CIPRODEX) otic suspension    Acute serous otitis media of left ear, recurrence not specified        Relevant Medications    amoxicillin-clavulanate (AUGMENTIN) 875-125 MG per tablet               Patient Instructions   Increase rest and fluids. Tylenol and/or Ibuprofen for comfort. Cool mist vaporizer. If your symptoms worsen or do not resolve follow up with your primary care provider in 1-2 weeks and sooner if needed.     If your ear becomes more painful and swollen you need to be seen immediately as you are at a greater risk for infection being a diabetic.       Indications for emergent return to emergency department discussed with patient, who verbalized good understanding and  agreement.  Patient understands the limitations of today's evaluation.           Otitis Media (Middle-Ear Infection) in Adults  Otitis media is another name for a middle-ear infection. It means an infection behind your eardrum. This kind of ear infection can happen after any condition that keeps fluid from draining from the middle ear. These conditions include allergies, a cold, a sore throat, or a respiratory infection.  Middle-ear infections are common in children, but they can also happen in adults. An ear infection in an adult may mean a more serious problem than in a child. So you may need additional tests. If you have an ear infection, you should see your health care provider for treatment.  What are the types of middle-ear infections?  Infections can affect the middle ear in several ways. They are:    Acute otitis media. This middle-ear infection occurs suddenly. It causes swelling and redness. Fluid and mucus become trapped inside the ear. You can have a fever and ear pain.    Otitis media with effusion. Fluid (effusion) and mucus build up in the middle ear after the infection goes away. You may feel like your middle ear is full. This can continue for months and may affect your hearing.    Chronic otitis media with effusion. Fluid (effusion) remains in the middle ear for a long time. Or it builds up again and again, even though there is no infection. This type of middle-ear infection may be hard to treat. It may also affect your hearing.  Who is more likely to get a middle-ear infection?  You are more likely to get an ear infection if you:    Smoke or are around someone who smokes    Have seasonal or year-round allergy symptoms    Have a cold or other upper respiratory infection  What causes a middle-ear infection?  The middle ear connects to the throat by a canal called the eustachian tube. This tube helps even out the pressure between the outer ear and the inner ear. A cold or allergy can irritate the tube  or cause the area around it to swell. This can keep fluid from draining from the middle ear. The fluid builds up behind the eardrum. Bacteria and viruses can grow in this fluid. The bacteria and viruses cause the middle-ear infection.  What are the symptoms of a middle-ear infection?  Common symptoms of a middle-ear infection in adults are:    Pain in 1 or both ears    Drainage from the ear    Muffled hearing    Sore throat   You may also have a fever. Rarely, your balance can be affected.  These symptoms may be the same as for other conditions. It s important to talk with your health care provider if you think you have a middle-ear infection. If you have a high fever, severe pain behind your ear, or paralysis in your face, see your provider as soon as you can.  How is a middle-ear infection diagnosed?  Your health care provider will take a medical history and do a physical exam. He or she will look at the outer ear and eardrum with an otoscope. The otoscope is a lighted tool that lets your provider see inside the ear. A pneumatic otoscope blows a puff of air into the ear to check how well your eardrum moves. If you eardrum doesn t move well, it may mean you have fluid behind it.  Your provider may also do a test called tympanometry. This test tells how well the middle ear is working. It can find any changes in pressure in the middle ear. Your provider may test your hearing with a tuning fork.  How is a middle-ear infection treated?  A middle-ear infection may be treated with:    Antibiotics, taken by mouth or as ear drops    Medication for pain    Decongestants, antihistamines, or nasal steroids  Your health care provider may also have you try autoinsufflation. This helps adjust the air pressure in your ear. For this, you pinch your nose and gently exhale. This forces air back through the eustachian tube.  The exact treatment for your ear infection will depend on the type of infection you have. In general, if your  symptoms don t get better in 48 to 72 hours, contact your health care provider.  Middle-ear infections can cause long-term problems if not treated. They can lead to:    Infection in other parts of the head    Permanent hearing loss    Paralysis of a nerve in your face  If you have a middle-ear infection that doesn t get better, you may need to see an ear, nose, and throat specialist (otolaryngologist). You may need a CT scan or MRI to check for head and neck cancer.  Ear tubes  Sometimes fluid stays in the middle ear even after you take antibiotics and the infection goes away. In this case, your health care provider may suggest that a small tube be placed in your ear. The tube is put at the opening of the eardrum. The tube keeps fluid from building up and relieves pressure in the middle ear. It can also help you hear better. This surgery is called myringotomy. It is not often done in adults.  The tubes usually fall out on their own after 6 months to a year.    1503-1272 [employer]. [address] [place] [address]. All rights reserved. This information is not intended as a substitute for professional medical care. Always follow your healthcare professional's instructions.        External Ear Infection (Adult)    External otitis (also called  swimmer s ear ) is an infection in the ear canal. It is often caused by bacteria or fungus. It can occur a few days after water gets trapped in the ear canal (from swimming or bathing). It can also occur after cleaning too deeply in the ear canal with a cotton swab or other object. Sometimes, hair care products get into the ear canal and cause this problem.  Symptoms can include pain, fever, itching, redness, drainage, or swelling of the ear canal. Temporary hearing loss may also occur.  Home care    Do not try to clean the ear canal. This can push pus and bacteria deeper into the canal.    Use prescribed ear drops as directed. These help reduce swelling  and fight the infection. If an ear wick was placed in the ear canal, apply drops right onto the end of the wick. The wick will draw the medication into the ear canal even if it is swollen closed.    A cotton ball may be loosely placed in the outer ear to absorb any drainage.    You may use acetaminophen or ibuprofen to control pain, unless another medication was prescribed. Note: If you have chronic liver or kidney disease or ever had a stomach ulcer or GI bleeding, talk to your health care provider before taking any of these medications.    Do not allow water to get into your ear when bathing. Also, avoid swimming until the infection has cleared.  Prevention    Keep your ears dry. This helps lower the risk of infection. Dry your ears with a towel or hair dryer after getting wet. Also, use ear plugs when swimming.    Do not stick any objects in the ear to remove wax.    If you feel water trapped in your ear, use ear drops right away. You can get these drops over the counter at most drugstores. They work by removing water from the ear canal.  Follow-up care  Follow up with your health care provider in one week, or as advised.  When to seek medical advice  Call your health care provider right away if any of these occur:    Ear pain becomes worse or doesn t improve after 3 days of treatment    Redness or swelling of the outer ear occurs or gets worse    Headache    Painful or stiff neck    Drowsiness or confusion    Fever of 100.4 F (38 C) or higher, or as directed by your health care provider    Seizure  Date Last Reviewed: 3/22/2015    9794-1576 [employer]. [address] [place] [address], [place] [id number]. All rights reserved. This information is not intended as a substitute for professional medical care. Always follow your healthcare professional's instructions.            NIURKA Phipps [facility] U[facility]

## 2017-08-31 NOTE — PATIENT INSTRUCTIONS
Increase rest and fluids. Tylenol and/or Ibuprofen for comfort. Cool mist vaporizer. If your symptoms worsen or do not resolve follow up with your primary care provider in 1-2 weeks and sooner if needed.     If your ear becomes more painful and swollen you need to be seen immediately as you are at a greater risk for infection being a diabetic.       Indications for emergent return to emergency department discussed with patient, who verbalized good understanding and agreement.  Patient understands the limitations of today's evaluation.           Otitis Media (Middle-Ear Infection) in Adults  Otitis media is another name for a middle-ear infection. It means an infection behind your eardrum. This kind of ear infection can happen after any condition that keeps fluid from draining from the middle ear. These conditions include allergies, a cold, a sore throat, or a respiratory infection.  Middle-ear infections are common in children, but they can also happen in adults. An ear infection in an adult may mean a more serious problem than in a child. So you may need additional tests. If you have an ear infection, you should see your health care provider for treatment.  What are the types of middle-ear infections?  Infections can affect the middle ear in several ways. They are:    Acute otitis media. This middle-ear infection occurs suddenly. It causes swelling and redness. Fluid and mucus become trapped inside the ear. You can have a fever and ear pain.    Otitis media with effusion. Fluid (effusion) and mucus build up in the middle ear after the infection goes away. You may feel like your middle ear is full. This can continue for months and may affect your hearing.    Chronic otitis media with effusion. Fluid (effusion) remains in the middle ear for a long time. Or it builds up again and again, even though there is no infection. This type of middle-ear infection may be hard to treat. It may also affect your hearing.  Who is  more likely to get a middle-ear infection?  You are more likely to get an ear infection if you:    Smoke or are around someone who smokes    Have seasonal or year-round allergy symptoms    Have a cold or other upper respiratory infection  What causes a middle-ear infection?  The middle ear connects to the throat by a canal called the eustachian tube. This tube helps even out the pressure between the outer ear and the inner ear. A cold or allergy can irritate the tube or cause the area around it to swell. This can keep fluid from draining from the middle ear. The fluid builds up behind the eardrum. Bacteria and viruses can grow in this fluid. The bacteria and viruses cause the middle-ear infection.  What are the symptoms of a middle-ear infection?  Common symptoms of a middle-ear infection in adults are:    Pain in 1 or both ears    Drainage from the ear    Muffled hearing    Sore throat   You may also have a fever. Rarely, your balance can be affected.  These symptoms may be the same as for other conditions. It s important to talk with your health care provider if you think you have a middle-ear infection. If you have a high fever, severe pain behind your ear, or paralysis in your face, see your provider as soon as you can.  How is a middle-ear infection diagnosed?  Your health care provider will take a medical history and do a physical exam. He or she will look at the outer ear and eardrum with an otoscope. The otoscope is a lighted tool that lets your provider see inside the ear. A pneumatic otoscope blows a puff of air into the ear to check how well your eardrum moves. If you eardrum doesn t move well, it may mean you have fluid behind it.  Your provider may also do a test called tympanometry. This test tells how well the middle ear is working. It can find any changes in pressure in the middle ear. Your provider may test your hearing with a tuning fork.  How is a middle-ear infection treated?  A middle-ear  infection may be treated with:    Antibiotics, taken by mouth or as ear drops    Medication for pain    Decongestants, antihistamines, or nasal steroids  Your health care provider may also have you try autoinsufflation. This helps adjust the air pressure in your ear. For this, you pinch your nose and gently exhale. This forces air back through the eustachian tube.  The exact treatment for your ear infection will depend on the type of infection you have. In general, if your symptoms don t get better in 48 to 72 hours, contact your health care provider.  Middle-ear infections can cause long-term problems if not treated. They can lead to:    Infection in other parts of the head    Permanent hearing loss    Paralysis of a nerve in your face  If you have a middle-ear infection that doesn t get better, you may need to see an ear, nose, and throat specialist (otolaryngologist). You may need a CT scan or MRI to check for head and neck cancer.  Ear tubes  Sometimes fluid stays in the middle ear even after you take antibiotics and the infection goes away. In this case, your health care provider may suggest that a small tube be placed in your ear. The tube is put at the opening of the eardrum. The tube keeps fluid from building up and relieves pressure in the middle ear. It can also help you hear better. This surgery is called myringotomy. It is not often done in adults.  The tubes usually fall out on their own after 6 months to a year.    5448-3532 [employer]. [address] [place] [address]. All rights reserved. This information is not intended as a substitute for professional medical care. Always follow your healthcare professional's instructions.        External Ear Infection (Adult)    External otitis (also called  swimmer s ear ) is an infection in the ear canal. It is often caused by bacteria or fungus. It can occur a few days after water gets trapped in the ear canal (from swimming or bathing).  It can also occur after cleaning too deeply in the ear canal with a cotton swab or other object. Sometimes, hair care products get into the ear canal and cause this problem.  Symptoms can include pain, fever, itching, redness, drainage, or swelling of the ear canal. Temporary hearing loss may also occur.  Home care    Do not try to clean the ear canal. This can push pus and bacteria deeper into the canal.    Use prescribed ear drops as directed. These help reduce swelling and fight the infection. If an ear wick was placed in the ear canal, apply drops right onto the end of the wick. The wick will draw the medication into the ear canal even if it is swollen closed.    A cotton ball may be loosely placed in the outer ear to absorb any drainage.    You may use acetaminophen or ibuprofen to control pain, unless another medication was prescribed. Note: If you have chronic liver or kidney disease or ever had a stomach ulcer or GI bleeding, talk to your health care provider before taking any of these medications.    Do not allow water to get into your ear when bathing. Also, avoid swimming until the infection has cleared.  Prevention    Keep your ears dry. This helps lower the risk of infection. Dry your ears with a towel or hair dryer after getting wet. Also, use ear plugs when swimming.    Do not stick any objects in the ear to remove wax.    If you feel water trapped in your ear, use ear drops right away. You can get these drops over the counter at most drugstores. They work by removing water from the ear canal.  Follow-up care  Follow up with your health care provider in one week, or as advised.  When to seek medical advice  Call your health care provider right away if any of these occur:    Ear pain becomes worse or doesn t improve after 3 days of treatment    Redness or swelling of the outer ear occurs or gets worse    Headache    Painful or stiff neck    Drowsiness or confusion    Fever of 100.4 F (38 C) or higher,  or as directed by your health care provider    Seizure  Date Last Reviewed: 3/22/2015    9371-8149 [employer]. [address] [place] [address], [place] [address]. All rights reserved. This information is not intended as a substitute for professional medical care. Always follow your healthcare professional's instructions.

## 2017-09-05 DIAGNOSIS — E11.9 TYPE 2 DIABETES MELLITUS WITHOUT COMPLICATION, UNSPECIFIED LONG TERM INSULIN USE STATUS: ICD-10-CM

## 2017-09-05 NOTE — TELEPHONE ENCOUNTER
metFORMIN (GLUCOPHAGE) 1000 MG tablet         Last Written Prescription Date: 5/22/2017  Last Fill Quantity: 180, # refills: 0  [place] [facility] [place] [facility] prescribing provider:  2/14/2017       BP Readings from Last 3 Encounters:   08/30/17 139/86   04/30/17 117/82   02/14/17 122/70     Lab Results   Component Value Date    MICROL 42 11/28/2016     Lab Results   Component Value Date    UMALCR 29.72 11/28/2016     Creatinine   Date Value Ref Range Status   11/28/2016 1.01 0.66 - 1.25 mg/dL Final   ]  GFR Estimate   Date Value Ref Range Status   11/28/2016 79 >60 mL/min/1.7m2 Final     Comment:     Non  GFR Calc   09/14/2015 89 >60 mL/min/1.7m2 Final     Comment:     Non  GFR Calc   09/17/2014 82 >60 mL/min/1.7m2 Final     Comment:     Non  GFR Calc     GFR Estimate If Black   Date Value Ref Range Status   11/28/2016 >90   GFR Calc   >60 mL/min/1.7m2 Final   09/14/2015 >90   GFR Calc   >60 mL/min/1.7m2 Final   09/17/2014 >90   GFR Calc   >60 mL/min/1.7m2 Final     Lab Results   Component Value Date    CHOL 132 11/28/2016     Lab Results   Component Value Date    HDL 44 11/28/2016     Lab Results   Component Value Date    LDL 61 11/28/2016     Lab Results   Component Value Date    TRIG 134 11/28/2016     Lab Results   Component Value Date    CHOLHDLRATIO 2.9 09/14/2015     Lab Results   Component Value Date    AST 22 11/28/2016     Lab Results   Component Value Date    ALT 34 11/28/2016     Lab Results   Component Value Date    A1C 7.6 02/14/2017    A1C 8.0 11/28/2016    A1C 6.7 09/14/2015    A1C 7.0 09/17/2014    A1C 6.2 09/11/2013     Potassium   Date Value Ref Range Status   11/28/2016 4.2 3.4 - 5.3 mmol/L Final

## 2017-10-02 ENCOUNTER — APPOINTMENT (OUTPATIENT)
Dept: OCCUPATIONAL MEDICINE | Facility: CLINIC | Age: 47
End: 2017-10-02

## 2017-10-02 PROCEDURE — 99000 SPECIMEN HANDLING OFFICE-LAB: CPT | Performed by: PHYSICIAN ASSISTANT

## 2017-10-11 ENCOUNTER — OFFICE VISIT (OUTPATIENT)
Dept: SLEEP MEDICINE | Facility: CLINIC | Age: 47
End: 2017-10-11
Payer: COMMERCIAL

## 2017-10-11 VITALS
HEIGHT: 73 IN | OXYGEN SATURATION: 99 % | DIASTOLIC BLOOD PRESSURE: 85 MMHG | SYSTOLIC BLOOD PRESSURE: 127 MMHG | WEIGHT: 265 LBS | HEART RATE: 100 BPM | BODY MASS INDEX: 35.12 KG/M2

## 2017-10-11 DIAGNOSIS — G47.33 OSA (OBSTRUCTIVE SLEEP APNEA): Primary | ICD-10-CM

## 2017-10-11 PROCEDURE — 99214 OFFICE O/P EST MOD 30 MIN: CPT | Performed by: PHYSICIAN ASSISTANT

## 2017-10-11 NOTE — PATIENT INSTRUCTIONS

## 2017-10-11 NOTE — MR AVS SNAPSHOT
After Visit Summary   10/11/2017    Osmar Angel    MRN: [id number]           Patient Information     Date Of Birth          1970        Visit Information        Provider Department      10/11/2017 3:30 PM Jt Heck PA [facility]        Today's Diagnoses     VERONA (obstructive sleep apnea)    -  1      Care Instructions      Your BMI is Body mass index is 34.96 kg/(m^2).  Weight management is a personal decision.  If you are interested in exploring weight loss strategies, the following discussion covers the approaches that may be successful. Body mass index (BMI) is one way to tell whether you are at a healthy weight, overweight, or obese. It measures your weight in relation to your height.  A BMI of 18.5 to 24.9 is in the healthy range. A person with a BMI of 25 to 29.9 is considered overweight, and someone with a BMI of 30 or greater is considered obese. More than two-thirds of American adults are considered overweight or obese.  Being overweight or obese increases the risk for further weight gain. Excess weight may lead to heart disease and diabetes.  Creating and following plans for healthy eating and physical activity may help you improve your health.  Weight control is part of healthy lifestyle and includes exercise, emotional health, and healthy eating habits. Careful eating habits lifelong are the mainstay of weight control. Though there are significant health benefits from weight loss, long-term weight loss with diet alone may be very difficult to achieve- studies show long-term success with dietary management in less than 10% of people. Attaining a healthy weight may be especially difficult to achieve in those with severe obesity. In some cases, medications, devices and surgical management might be considered.  What can you do?  If you are overweight or obese and are interested in methods for weight loss, you should discuss this with your provider.     Consider  reducing daily calorie intake by 500 calories.     Keep a food journal.     Avoiding skipping meals, consider cutting portions instead.    Diet combined with exercise helps maintain muscle while optimizing fat loss. Strength training is particularly important for building and maintaining muscle mass. Exercise helps reduce stress, increase energy, and improves fitness. Increasing exercise without diet control, however, may not burn enough calories to loose weight.       Start walking three days a week 10-20 minutes at a time    Work towards walking thirty minutes five days a week     Eventually, increase the speed of your walking for 1-2 minutes at time    In addition, we recommend that you review healthy lifestyles and methods for weight loss available through the National Institutes of Health patient information sites:  http://win.niddk.nih.gov/publications/index.htm    And look into health and wellness programs that may be available through your health insurance provider, employer, local community center, or deepika club.    Weight management plan: Patient was referred to their PCP to discuss a diet and exercise plan.      Your Body mass index is 34.96 kg/(m^2).  Weight management is a personal decision.  If you are interested in exploring weight loss strategies, the following discussion covers the approaches that may be successful. Body mass index (BMI) is one way to tell whether you are at a healthy weight, overweight, or obese. It measures your weight in relation to your height.  A BMI of 18.5 to 24.9 is in the healthy range. A person with a BMI of 25 to 29.9 is considered overweight, and someone with a BMI of 30 or greater is considered obese. More than two-thirds of American adults are considered overweight or obese.  Being overweight or obese increases the risk for further weight gain. Excess weight may lead to heart disease and diabetes.  Creating and following plans for healthy eating and physical activity  may help you improve your health.  Weight control is part of healthy lifestyle and includes exercise, emotional health, and healthy eating habits. Careful eating habits lifelong are the mainstay of weight control. Though there are significant health benefits from weight loss, long-term weight loss with diet alone may be very difficult to achieve- studies show long-term success with dietary management in less than 10% of people. Attaining a healthy weight may be especially difficult to achieve in those with severe obesity. In some cases, medications, devices and surgical management might be considered.  What can you do?  If you are overweight or obese and are interested in methods for weight loss, you should discuss this with your provider.     Consider reducing daily calorie intake by 500 calories.     Keep a food journal.     Avoiding skipping meals, consider cutting portions instead.    Diet combined with exercise helps maintain muscle while optimizing fat loss. Strength training is particularly important for building and maintaining muscle mass. Exercise helps reduce stress, increase energy, and improves fitness. Increasing exercise without diet control, however, may not burn enough calories to loose weight.       Start walking three days a week 10-20 minutes at a time    Work towards walking thirty minutes five days a week     Eventually, increase the speed of your walking for 1-2 minutes at time    In addition, we recommend that you review healthy lifestyles and methods for weight loss available through the National Institutes of Health patient information sites:  http://win.niddk.nih.gov/publications/index.htm    And look into health and wellness programs that may be available through your health insurance provider, employer, local community center, or [employer]ika club.    Weight management plan: Patient was referred to their PCP to discuss a diet and exercise plan.            Follow-ups after your visit       "  Follow-up notes from your care team     Return in 1 year (on 10/11/2018) for PAP follow up.      Who to contact     If you have questions or need follow up information about today's clinic visit or your schedule please contact [facility]note 3[facility] directly at [phone].  Normal or non-critical lab and imaging results will be communicated to you by MyChart, letter or phone within 4 business days after the clinic has received the results. If you do not hear from us within 7 days, please contact the clinic through [employer] or phone. If you have a critical or abnormal lab result, we will notify you by phone as soon as possible.  Submit refill requests through deltamethod or call your pharmacy and they will forward the refill request to us. Please allow 3 business days for your refill to be completed.          Additional Information About Your Visit        MyChart Information     deltamethod gives you secure access to your electronic health record. If you see a primary care provider, you can also send messages to your care team and make appointments. If you have questions, please call your primary care clinic.  If you do not have a primary care provider, please call [phone] and they will assist you.        Care EveryWhere ID     This is your Care EveryWhere ID. This could be used by other organizations to access your [place] medical records  [id number]        Your Vitals Were     Pulse Height Pulse Oximetry BMI (Body Mass Index)          100 1.854 m (6' 1\") 99% 34.96 kg/m2         Blood Pressure from Last 3 Encounters:   10/11/17 127/85   08/30/17 139/86   04/30/17 117/82    Weight from Last 3 Encounters:   10/11/17 120.2 kg (265 lb)   08/30/17 122.8 kg (270 lb 12.8 oz)   04/30/17 117.9 kg (260 lb)              We Performed the Following     Sleep Comprehensive DME        Primary Care Provider Office Phone # Fax #    NIURKA Villarreal [phone] [address] [place] [address] " [place] [address]        Equal Access to Services     [facility] : Hadii herbert ku danish Sosadie, waaxda luqadaha, qaybta kaalmada neetakarinaemiliana, waxky michael sanradhajennifer sanchez. [facility] [phone].    ATENCIÓN: Si habla español, tiene a berger disposición servicios gratuitos de asistencia lingüística. Llame al [phone].    We comply with applicable federal civil rights laws and [place] laws. We do not discriminate on the basis of race, color, national origin, age, disability, sex, sexual orientation, or gender identity.            Thank you!     Thank you for choosing [facility]  for your care. Our goal is always to provide you with excellent care. Hearing back from our patients is one way we can continue to improve our services. Please take a few minutes to complete the written survey that you may receive in the mail after your visit with us. Thank you!             Your Updated Medication List - Protect others around you: Learn how to safely use, store and throw away your medicines at www.disposemymeds.org.          This list is accurate as of: 10/11/17  3:58 PM.  Always use your most recent med list.                   Brand Name Dispense Instructions for use Diagnosis    aspirin 81 MG tablet      Take 1 tablet by mouth daily.        blood glucose monitoring lancets     102 Box    1 each daily Use to test blood sugar 1 times daily or as directed.    Type 2 diabetes mellitus without complication, unspecified long term insulin use status (H)       blood glucose monitoring test strip    no brand specified    3 Box    Use to test blood sugar 3 times daily or as directed. Compatible with [employer] monitoring device    Type 2 diabetes mellitus without complication, unspecified long term insulin use status (H)       CINNAMON PO           lisinopril 10 MG tablet    PRINIVIL/ZESTRIL    90 tablet    Take 1 tablet (10 mg) by mouth daily    Hypertension goal BP (blood pressure) < 140/90, Type 2 diabetes  mellitus without complication, unspecified long term insulin use status (H)       metFORMIN 1000 MG tablet    GLUCOPHAGE    180 tablet    TAKE ONE TABLET BY MOUTH TWICE DAILY WITH MEALS    Type 2 diabetes mellitus without complication, unspecified long term insulin use status (H)       NAPROXEN SODIUM PO      Take 100 mg by mouth        OMEGA-3 FISH OIL PO           order for DME      Equipment being ordered: CPAP Osmar Angel  received a [employer] DreamStation Auto CPAP Auto. Pressures were set at Auto 11 - 15 cm H2O.        simvastatin 10 MG tablet    ZOCOR    90 tablet    Take 1 tablet (10 mg) by mouth At Bedtime    Hyperlipidemia LDL goal <100, Type 2 diabetes mellitus without complication, without long-term current use of insulin (H)       triamcinolone 0.1 % cream    KENALOG    80 g    Apply topically 2 times daily Apply sparingly to affected area three times daily as needed    Dermatitis       VITAMIN D (CHOLECALCIFEROL) PO      Take by mouth daily

## 2017-10-11 NOTE — NURSING NOTE
"Chief Complaint   Patient presents with     CPAP Follow Up     Annual CPAP follow up       Initial /85  Pulse 100  Ht 1.854 m (6' 1\")  Wt 120.2 kg (265 lb)  SpO2 99%  BMI 34.96 kg/m2 Estimated body mass index is 34.96 kg/(m^2) as calculated from the following:    Height as of this encounter: 1.854 m (6' 1\").    Weight as of this encounter: 120.2 kg (265 lb).  Medication Reconciliation: complete    "

## 2017-10-11 NOTE — PROGRESS NOTES
Obstructive Sleep Apnea- PAP Follow-Up Visit:    Chief Complaint   Patient presents with     CPAP Follow Up     Annual CPAP follow up       Osmar Angel comes in today for follow-up of their severe sleep apnea, managed with CPAP.     Osmar Angel presented in 2015 for evaluation of snoring (10 years), witnessed apnea (7 years) and daytime fatigue (2-3 years). He had a sleep study on 8/20/2015 at the [facility] [place] for snoring, observed apnea, excessive daytime sleepiness (ESS 12), morning dry mouth, morning headaches, large neck circumference (50 cm) and co-morbid HTN, and DMII. Sleep study demonstrated severe obstructive sleep apnea with AHI of 71, lowest oxygen saturation was 72%. RDI 71. Periodic Limb Movement Index 0.0/hour.    August 26, 2015.  Patient received a [employer] DreamStation Auto CPAP Auto. Pressures were set at Auto 11 - 15 cm H2O.    Overall, the patient rates his experience with PAP as 10 (0 poor, 10 great). The mask is comfortable. The mask is  leaking.  He is not snoring with the mask on. He is not having gasp arousals. He is not having any oral or nasal dryness. The pressure settings are comfortable    His PAP interface is Full Face Mask.    Bedtime is typically 9-10 AM. Usually it takes about 5 minutes to fall asleep with the mask on. Wake time is typically 4 AM.  Patient is using PAP therapy 6 hours per night. The patient is usually getting 6 hours of sleep per night.    He does feel rested in the morning.    Total score - [place]: 5 (10/11/2017  3:34 PM)      Respironics  Auto-PAP 11 - 15 cmH2O 30 day usage data:    86% of days with > 4 hours of use. 0/30 days with no use. Average use 334 minutes per day.   Average leak 57.36 LPM.  Average % of night in large leak 3%.    CPAP 90% pressure 13.96cm.   AHI 9.35 events per hour.        Past medical/surgical history, family history, social history, medications and allergies were reviewed.      Problem  "List:  Patient Active Problem List    Diagnosis Date Noted     VERONA (obstructive sleep apnea)-AHI 71 08/26/2015     Priority: Medium     Severe VERONA: 8/20/2015 (AHI 71, RDI 71, rebel 72%)       Obesity, Class II, BMI 35-39.9, with comorbidity 08/12/2015     Priority: Medium     Type 2 diabetes mellitus without complications (H) 06/04/2012     Priority: Medium     Diagnosed 2011       Hyperlipidemia LDL goal <100 06/04/2012     Priority: Medium     Hypertension goal BP (blood pressure) < 140/90 01/15/2010     Priority: Medium     External hemorrhoids 10/27/2008     Priority: Medium     Pain in joint, upper arm 06/04/2007     Priority: Medium     Profound impairment, one eye, impairment level not further specified 06/07/2005     Priority: Medium     left eye loss of direct vision          /85  Pulse 100  Ht 1.854 m (6' 1\")  Wt 120.2 kg (265 lb)  SpO2 99%  BMI 34.96 kg/m2      Impression/Plan:  1. Severe Sleep apnea  Tolerating PAP well. Daytime symptoms are improved..   Change pressures from 11-15 cm/H20 for residual AHI of 7 cm/H20. New mask as well.   Comprehensive DME.  Virtual download in 2 weeks.     Osmar Angel will follow up in about 1 year, sooner if questions/concerns.    Twenty-five minutes spent with patient, all of which were spent face-to-face counseling, consulting, coordinating plan of care regarding VERONA.      Jt Heck PA-C      CC:  Cinthya Johnson  "

## 2017-12-04 DIAGNOSIS — E11.9 TYPE 2 DIABETES MELLITUS WITHOUT COMPLICATION, UNSPECIFIED LONG TERM INSULIN USE STATUS: ICD-10-CM

## 2017-12-11 DIAGNOSIS — E11.9 TYPE 2 DIABETES MELLITUS WITHOUT COMPLICATION, UNSPECIFIED LONG TERM INSULIN USE STATUS: ICD-10-CM

## 2017-12-11 DIAGNOSIS — I10 HYPERTENSION GOAL BP (BLOOD PRESSURE) < 140/90: Chronic | ICD-10-CM

## 2017-12-11 RX ORDER — LISINOPRIL 10 MG/1
TABLET ORAL
Qty: 30 TABLET | Refills: 0 | Status: SHIPPED | OUTPATIENT
Start: 2017-12-11 | End: 2018-01-15

## 2017-12-18 DIAGNOSIS — E11.9 TYPE 2 DIABETES MELLITUS WITHOUT COMPLICATION, UNSPECIFIED LONG TERM INSULIN USE STATUS: ICD-10-CM

## 2018-01-15 DIAGNOSIS — E11.9 TYPE 2 DIABETES MELLITUS WITHOUT COMPLICATION, UNSPECIFIED LONG TERM INSULIN USE STATUS: ICD-10-CM

## 2018-01-15 DIAGNOSIS — I10 HYPERTENSION GOAL BP (BLOOD PRESSURE) < 140/90: Chronic | ICD-10-CM

## 2018-01-15 RX ORDER — LISINOPRIL 10 MG/1
TABLET ORAL
Qty: 30 TABLET | Refills: 0 | Status: SHIPPED | OUTPATIENT
Start: 2018-01-15 | End: 2018-02-19

## 2018-01-29 DIAGNOSIS — I10 HYPERTENSION GOAL BP (BLOOD PRESSURE) < 140/90: Chronic | ICD-10-CM

## 2018-01-29 DIAGNOSIS — E78.5 HYPERLIPIDEMIA LDL GOAL <100: Primary | ICD-10-CM

## 2018-01-29 DIAGNOSIS — E11.9 TYPE 2 DIABETES MELLITUS WITHOUT COMPLICATION, UNSPECIFIED LONG TERM INSULIN USE STATUS: ICD-10-CM

## 2018-01-29 NOTE — TELEPHONE ENCOUNTER
"Requested Prescriptions   Pending Prescriptions Disp Refills     metFORMIN (GLUCOPHAGE) 1000 MG tablet [Pharmacy Med Name: METFORMIN 1,000 MG  TAB AURO] 60 tablet 0     Sig: TAKE ONE TABLET BY MOUTH TWICE DAILY WITH MEALS-MUST SEE DR FOR REFILLS    Biguanide Agents Failed    1/29/2018  8:46 AM       Failed - Patient has documented LDL within the past 12 mos.    Recent Labs   Lab Test  11/28/16   1344   LDL  61            Failed - Patient has had a Microalbumin in the past 12 mos.    Recent Labs   Lab Test  11/28/16   1344   MICROL  42   UMALCR  29.72*            Failed - Patient has documented A1c within the specified period of time.    Recent Labs   Lab Test  02/14/17   1130   A1C  7.6*            Passed - Patient's BP is less than 140/90    BP Readings from Last 3 Encounters:   10/11/17 127/85   08/30/17 139/86   04/30/17 117/82                Passed - Patient is age 10 or older       Passed - Patient's CR is NOT>1.4 OR Patient's EGFR is NOT<45 within past 12 mos.    Recent Labs   Lab Test  11/28/16   1344   GFRESTIMATED  79   GFRESTBLACK  >90   GFR Calc         Recent Labs   Lab Test  11/28/16   1344   CR  1.01            Passed - Patient does NOT have a diagnosis of CHF.       Passed - Recent (6 mos) or future visit with authorizing provider's specialty    Patient had office visit in the last 6 months or has a visit in the next 30 days with authorizing provider.  See \"Patient Info\" tab in inbasket, or \"Choose Columns\" in Meds & Orders section of the refill encounter.      Last Written Prescription Date:  12/18/17  Last Fill Quantity: 60,  # refills: 0   Last Office Visit with [facility] provider:  2/14/17   Future Office Visit:                 "

## 2018-02-19 DIAGNOSIS — E78.5 HYPERLIPIDEMIA LDL GOAL <100: ICD-10-CM

## 2018-02-19 DIAGNOSIS — E11.9 TYPE 2 DIABETES MELLITUS WITHOUT COMPLICATION, UNSPECIFIED LONG TERM INSULIN USE STATUS: ICD-10-CM

## 2018-02-19 DIAGNOSIS — I10 HYPERTENSION GOAL BP (BLOOD PRESSURE) < 140/90: Chronic | ICD-10-CM

## 2018-02-19 DIAGNOSIS — E11.9 TYPE 2 DIABETES MELLITUS WITHOUT COMPLICATION, WITHOUT LONG-TERM CURRENT USE OF INSULIN (H): ICD-10-CM

## 2018-02-19 RX ORDER — SIMVASTATIN 10 MG
TABLET ORAL
Qty: 30 TABLET | Refills: 0 | Status: SHIPPED | OUTPATIENT
Start: 2018-02-19 | End: 2018-03-19

## 2018-02-19 RX ORDER — LISINOPRIL 10 MG/1
TABLET ORAL
Qty: 30 TABLET | Refills: 0 | Status: SHIPPED | OUTPATIENT
Start: 2018-02-19 | End: 2018-03-19

## 2018-02-26 DIAGNOSIS — E11.9 TYPE 2 DIABETES MELLITUS WITHOUT COMPLICATION, UNSPECIFIED LONG TERM INSULIN USE STATUS: ICD-10-CM

## 2018-02-26 DIAGNOSIS — I10 HYPERTENSION GOAL BP (BLOOD PRESSURE) < 140/90: Chronic | ICD-10-CM

## 2018-02-26 DIAGNOSIS — E78.5 HYPERLIPIDEMIA LDL GOAL <100: ICD-10-CM

## 2018-02-26 LAB
ALBUMIN SERPL-MCNC: 3.5 G/DL (ref 3.4–5)
ALP SERPL-CCNC: 45 U/L (ref 40–150)
ALT SERPL W P-5'-P-CCNC: 33 U/L (ref 0–70)
ANION GAP SERPL CALCULATED.3IONS-SCNC: 7 MMOL/L (ref 3–14)
AST SERPL W P-5'-P-CCNC: 19 U/L (ref 0–45)
BILIRUB SERPL-MCNC: 0.7 MG/DL (ref 0.2–1.3)
BUN SERPL-MCNC: 17 MG/DL (ref 7–30)
CALCIUM SERPL-MCNC: 8.8 MG/DL (ref 8.5–10.1)
CHLORIDE SERPL-SCNC: 104 MMOL/L (ref 94–109)
CHOLEST SERPL-MCNC: 120 MG/DL
CO2 SERPL-SCNC: 26 MMOL/L (ref 20–32)
CREAT SERPL-MCNC: 0.95 MG/DL (ref 0.66–1.25)
CREAT UR-MCNC: 238 MG/DL
GFR SERPL CREATININE-BSD FRML MDRD: 85 ML/MIN/1.7M2
GLUCOSE SERPL-MCNC: 185 MG/DL (ref 70–99)
HBA1C MFR BLD: 8.1 % (ref 4.3–6)
HDLC SERPL-MCNC: 46 MG/DL
LDLC SERPL CALC-MCNC: 64 MG/DL
MICROALBUMIN UR-MCNC: 57 MG/L
MICROALBUMIN/CREAT UR: 24.03 MG/G CR (ref 0–17)
NONHDLC SERPL-MCNC: 74 MG/DL
POTASSIUM SERPL-SCNC: 4.2 MMOL/L (ref 3.4–5.3)
PROT SERPL-MCNC: 7 G/DL (ref 6.8–8.8)
SODIUM SERPL-SCNC: 137 MMOL/L (ref 133–144)
TRIGL SERPL-MCNC: 51 MG/DL

## 2018-02-26 PROCEDURE — 80061 LIPID PANEL: CPT | Performed by: NURSE PRACTITIONER

## 2018-02-26 PROCEDURE — 82043 UR ALBUMIN QUANTITATIVE: CPT | Performed by: NURSE PRACTITIONER

## 2018-02-26 PROCEDURE — 36415 COLL VENOUS BLD VENIPUNCTURE: CPT | Performed by: NURSE PRACTITIONER

## 2018-02-26 PROCEDURE — 80053 COMPREHEN METABOLIC PANEL: CPT | Performed by: NURSE PRACTITIONER

## 2018-02-26 PROCEDURE — 83036 HEMOGLOBIN GLYCOSYLATED A1C: CPT | Performed by: NURSE PRACTITIONER

## 2018-03-05 DIAGNOSIS — E11.9 TYPE 2 DIABETES MELLITUS WITHOUT COMPLICATION, UNSPECIFIED LONG TERM INSULIN USE STATUS: ICD-10-CM

## 2018-03-05 NOTE — TELEPHONE ENCOUNTER
"Requested Prescriptions   Pending Prescriptions Disp Refills     metFORMIN (GLUCOPHAGE) 1000 MG tablet [Pharmacy Med Name: METFORMIN 1,000 MG  TAB AURO] 60 tablet 0     Sig: TAKE 1 TABLET BY MOUTH TWICE DAILY WITH MEALS. NEEDS LABS AND APPT FOR FURTHER REFILLS    Biguanide Agents Failed    3/5/2018  8:08 AM       Failed - Recent (6 mo) or future (30 days) visit within the authorizing provider's specialty    Patient had office visit in the last 6 months or has a visit in the next 30 days with authorizing provider.  See \"Patient Info\" tab in inbasket, or \"Choose Columns\" in Meds & Orders section of the refill encounter.           Passed - Blood pressure less than 140/90 in past 6 months    BP Readings from Last 3 Encounters:   10/11/17 127/85   08/30/17 139/86   04/30/17 117/82                Passed - Patient has documented LDL within the past 12 mos.    Recent Labs   Lab Test  02/26/18   1003   LDL  64            Passed - Patient has had a Microalbumin in the past 12 mos.    Recent Labs   Lab Test  02/26/18   1003   MICROL  57   UMALCR  24.03*            Passed - Patient is age 10 or older       Passed - Patient has documented A1c within the specified period of time.    Recent Labs   Lab Test  02/26/18   1003   A1C  8.1*            Passed - Patient's CR is NOT>1.4 OR Patient's EGFR is NOT<45 within past 12 mos.    Recent Labs   Lab Test  02/26/18   1003   GFRESTIMATED  85   GFRESTBLACK  >90       Recent Labs   Lab Test  02/26/18   1003   CR  0.95            Passed - Patient does NOT have a diagnosis of CHF.        metFORMIN (GLUCOPHAGE) 1000 MG tablet  Last Written Prescription Date:  01/29/2018  Last Fill Quantity: 60 tablet,  # refills: 0   Last office visit: 2/14/2017 with prescribing provider:  02/14/2017 TRACE Johnson   Future Office Visit:      Rosalind MIRANDA) (M)    "

## 2018-03-19 ENCOUNTER — TELEPHONE (OUTPATIENT)
Dept: FAMILY MEDICINE | Facility: CLINIC | Age: 48
End: 2018-03-19

## 2018-03-19 DIAGNOSIS — I10 HYPERTENSION GOAL BP (BLOOD PRESSURE) < 140/90: Chronic | ICD-10-CM

## 2018-03-19 DIAGNOSIS — E11.9 TYPE 2 DIABETES MELLITUS WITHOUT COMPLICATION, UNSPECIFIED LONG TERM INSULIN USE STATUS: ICD-10-CM

## 2018-03-19 DIAGNOSIS — E11.9 TYPE 2 DIABETES MELLITUS WITHOUT COMPLICATION, WITHOUT LONG-TERM CURRENT USE OF INSULIN (H): ICD-10-CM

## 2018-03-19 DIAGNOSIS — E78.5 HYPERLIPIDEMIA LDL GOAL <100: ICD-10-CM

## 2018-03-19 RX ORDER — LISINOPRIL 10 MG/1
TABLET ORAL
Qty: 30 TABLET | Refills: 0 | OUTPATIENT
Start: 2018-03-19

## 2018-03-19 RX ORDER — SIMVASTATIN 10 MG
TABLET ORAL
Qty: 90 TABLET | Refills: 3 | Status: SHIPPED | OUTPATIENT
Start: 2018-03-19 | End: 2019-01-25

## 2018-03-19 RX ORDER — LISINOPRIL 10 MG/1
TABLET ORAL
Qty: 30 TABLET | Refills: 0 | Status: SHIPPED | OUTPATIENT
Start: 2018-03-19 | End: 2018-04-16

## 2018-03-19 NOTE — TELEPHONE ENCOUNTER
Routing refill request for lisinopril to provider for review/approval because:  Lexus given x1 and patient did not follow up, please advise  Patient needs to be seen because it has been more than 1 year since last office visit with listed PCP.    Geni AUGUSTINE RN

## 2018-03-19 NOTE — TELEPHONE ENCOUNTER
Please notify Osmar he is due for an appointment.  Ok to give enough to get to appointment.  Thanks.    NIURKA Escobar CNP

## 2018-03-19 NOTE — TELEPHONE ENCOUNTER
"Requested Prescriptions   Pending Prescriptions Disp Refills     lisinopril (PRINIVIL/ZESTRIL) 10 MG tablet 30 tablet 0    ACE Inhibitors (Including Combos) Protocol Passed    3/19/2018  1:46 PM       Passed - Blood pressure under 140/90 in past 12 months    BP Readings from Last 3 Encounters:   10/11/17 127/85   08/30/17 139/86   04/30/17 117/82                Passed - Recent (12 mo) or future (30 days) visit within the authorizing provider's specialty    Patient had office visit in the last 12 months or has a visit in the next 30 days with authorizing provider or within the authorizing provider's specialty.  See \"Patient Info\" tab in inbasket, or \"Choose Columns\" in Meds & Orders section of the refill encounter.           Passed - Patient is age 18 or older       Passed - Normal serum creatinine on file in past 12 months    Recent Labs   Lab Test  02/26/18   1003   CR  0.95            Passed - Normal serum potassium on file in past 12 months    Recent Labs   Lab Test  02/26/18   1003   POTASSIUM  4.2             Last Written Prescription Date:  2/19/18  Last Fill Quantity: 30,  # refills: 0   Last office visit: 2/14/2017 with prescribing provider:  Alex  Future Office Visit:      "

## 2018-03-27 ENCOUNTER — OFFICE VISIT (OUTPATIENT)
Dept: FAMILY MEDICINE | Facility: CLINIC | Age: 48
End: 2018-03-27
Payer: COMMERCIAL

## 2018-03-27 DIAGNOSIS — J02.0 ACUTE STREPTOCOCCAL PHARYNGITIS: Primary | ICD-10-CM

## 2018-03-27 DIAGNOSIS — R07.0 THROAT PAIN: ICD-10-CM

## 2018-03-27 LAB
DEPRECATED S PYO AG THROAT QL EIA: ABNORMAL
SPECIMEN SOURCE: ABNORMAL

## 2018-03-27 PROCEDURE — 87880 STREP A ASSAY W/OPTIC: CPT | Performed by: NURSE PRACTITIONER

## 2018-03-27 PROCEDURE — 99213 OFFICE O/P EST LOW 20 MIN: CPT | Performed by: NURSE PRACTITIONER

## 2018-03-27 RX ORDER — AMOXICILLIN 500 MG/1
500 CAPSULE ORAL 2 TIMES DAILY
Qty: 20 CAPSULE | Refills: 0 | Status: SHIPPED | OUTPATIENT
Start: 2018-03-27 | End: 2018-04-06

## 2018-03-27 NOTE — PROGRESS NOTES
SUBJECTIVE:   Osmar Angel is a 47 year old male who presents to clinic today for the following health issues:      Pt her today with wife who is positive for strep. Grandson also positive. Has a cough and tickle in throat.  Denies any fever or chills  No throat pain, just a tickle       Problem list and histories reviewed & adjusted, as indicated.  Additional history: as documented    Patient Active Problem List   Diagnosis     Profound impairment, one eye, impairment level not further specified     Pain in joint, upper arm     External hemorrhoids     Hypertension goal BP (blood pressure) < 140/90     Type 2 diabetes mellitus without complications (H)     Hyperlipidemia LDL goal <100     Obesity, Class II, BMI 35-39.9, with comorbidity     VERONA (obstructive sleep apnea)-AHI 71     Past Surgical History:   Procedure Laterality Date     C REPAIR ING HERNIA, INFANT,REDUC      bilateral     ROTATOR CUFF REPAIR RT/LT      right arthroscopic     SURGICAL HISTORY OF -       right knee arthroscopy     SURGICAL HISTORY OF -       umbilical hernia repair     VASECTOMY         Social History   Substance Use Topics     Smoking status: Former Smoker     Quit date: 2005     Smokeless tobacco: Never Used     Alcohol use 0.0 oz/week     0 Standard drinks or equivalent per week      Comment: rarely     Family History   Problem Relation Age of Onset     DIABETES Maternal Grandfather      C.A.D. Paternal Grandmother      C.A.D. Paternal Grandfather      Cardiovascular Father      a. fib     CEREBROVASCULAR DISEASE Father      HEART DISEASE Father      pacemaker     Asthma No family hx of      Hypertension No family hx of      Breast Cancer No family hx of      Cancer - colorectal No family hx of      Prostate Cancer No family hx of          Current Outpatient Prescriptions   Medication Sig Dispense Refill     amoxicillin (AMOXIL) 500 MG capsule Take 1 capsule (500 mg) by mouth 2 times daily for 10 days 20 capsule 0      simvastatin (ZOCOR) 10 MG tablet TAKE ONE TABLET BY MOUTH AT BEDTIME 90 tablet 3     lisinopril (PRINIVIL/ZESTRIL) 10 MG tablet TAKE 1 TABLET BY MOUTH ONCE DAILY 30 tablet 0     metFORMIN (GLUCOPHAGE) 1000 MG tablet Take 1 tablet (1,000 mg) by mouth 2 times daily (with meals) 180 tablet 1     VITAMIN D, CHOLECALCIFEROL, PO Take by mouth daily       blood glucose monitoring (NO BRAND SPECIFIED) test strip Use to test blood sugar 3 times daily or as directed. Compatible with Corina monitoring device 3 Box 3     blood glucose monitoring (ACCU-CHEK FASTCLIX) lancets 1 each daily Use to test blood sugar 1 times daily or as directed. 102 Box 5     order for DME Equipment being ordered: CPAP Osmar Angel  received a [employer] Auto CPAP Auto. Pressures were set at Auto 11 - 15 cm H2O.       CINNAMON PO        Omega-3 Fatty Acids (OMEGA-3 FISH OIL PO)        NAPROXEN SODIUM PO Take 100 mg by mouth        aspirin 81 MG tablet Take 1 tablet by mouth daily.       triamcinolone (KENALOG) 0.1 % cream Apply topically 2 times daily Apply sparingly to affected area three times daily as needed (Patient not taking: Reported on 8/30/2017) 80 g 3     Allergies   Allergen Reactions     Wellbutrin [Bupropion Hcl] Swelling       Reviewed and updated as needed this visit by clinical staff  Tobacco  Allergies  Meds  Problems  Med Hx  Surg Hx  Fam Hx  Soc Hx        Reviewed and updated as needed this visit by Provider  Tobacco  Allergies  Meds  Problems  Med Hx  Surg Hx  Fam Hx  Soc Hx          ROS:  Constitutional, HEENT, cardiovascular, pulmonary, gi and gu systems are negative, except as otherwise noted.    OBJECTIVE:     BP (P) 112/79  Pulse (P) 90  Temp 101.5  F (38.6  C)  Resp 18  There is no height or weight on file to calculate BMI.  GENERAL APPEARANCE: healthy, alert and no distress  EYES: Eyes grossly normal to inspection, PERRL and conjunctivae and sclerae normal  HENT: ear canals and  TM's normal, nose without ulcers or lesions, tonsillar erythema and tonsillar exudate  NECK: no adenopathy and no asymmetry, masses, or scars  RESP: lungs clear to auscultation - no rales, rhonchi or wheezes and cough  CV: regular rates and rhythm, normal S1 S2, no S3 or S4 and no murmur, click or rub  ABDOMEN: soft, nontender, without hepatosplenomegaly or masses, bowel sounds normal and obese    Diagnostic Test Results:  Results for orders placed or performed in visit on 03/27/18 (from the past 24 hour(s))   Strep, Rapid Screen   Result Value Ref Range    Specimen Description Throat     Rapid Strep A Screen (A)      POSITIVE: Group A Streptococcal antigen detected by immunoassay.       ASSESSMENT/PLAN:     1. Acute streptococcal pharyngitis    - amoxicillin (AMOXIL) 500 MG capsule; Take 1 capsule (500 mg) by mouth 2 times daily for 10 days  Dispense: 20 capsule; Refill: 0    2. Throat pain    - Strep, Rapid Screen    Patient Instructions   Strep positive - will treat with Amoxicillin for 10 days     Ibuprofen or Tylenol for fever or pain     Push fluids     Return to clinic if symptoms not improved or worsening       NIURKA Resendiz [facility]

## 2018-03-27 NOTE — MR AVS SNAPSHOT
After Visit Summary   3/27/2018    Osmar Angel    MRN: [id number]           Patient Information     Date Of Birth          1970        Visit Information        Provider Department      3/27/2018 1:40 PM Blanka Reece APRN CNP [facility]        Today's Diagnoses     Throat pain    -  1    Acute streptococcal pharyngitis          Care Instructions    Strep positive - will treat with Amoxicillin for 10 days     Ibuprofen or Tylenol for fever or pain     Push fluids     Return to clinic if symptoms not improved or worsening           Follow-ups after your visit        Who to contact     If you have questions or need follow up information about today's clinic visit or your schedule please contact [facility] directly at [phone].  Normal or non-critical lab and imaging results will be communicated to you by SmartC[employer]art, letter or phone within 4 business days after the clinic has received the results. If you do not hear from us within 7 days, please contact the clinic through SmartCrowdshart or phone. If you have a critical or abnormal lab result, we will notify you by phone as soon as possible.  Submit refill requests through [employer] or call your pharmacy and they will forward the refill request to us. Please allow 3 business days for your refill to be completed.          Additional Information About Your Visit        MyChart Information     [employer] gives you secure access to your electronic health record. If you see a primary care provider, you can also send messages to your care team and make appointments. If you have questions, please call your primary care clinic.  If you do not have a primary care provider, please call [phone] and they will assist you.        Care EveryWhere ID     This is your Care EveryWhere ID. This could be used by other organizations to access your [place] medical records  [id number]        Your Vitals Were     Temperature  Respirations                101.5  F (38.6  C) 18           Blood Pressure from Last 3 Encounters:   03/27/18 (P) 112/79   10/11/17 127/85   08/30/17 139/86    Weight from Last 3 Encounters:   10/11/17 265 lb (120.2 kg)   08/30/17 270 lb 12.8 oz (122.8 kg)   04/30/17 260 lb (117.9 kg)              We Performed the Following     Strep, Rapid Screen          Today's Medication Changes          These changes are accurate as of 3/27/18  2:19 PM.  If you have any questions, ask your nurse or doctor.               Start taking these medicines.        Dose/Directions    amoxicillin 500 MG capsule   Commonly known as:  AMOXIL   Used for:  Acute streptococcal pharyngitis   Started by:  Blanka Reece APRN CNP        Dose:  500 mg   Take 1 capsule (500 mg) by mouth 2 times daily for 10 days   Quantity:  20 capsule   Refills:  0            Where to get your medicines      These medications were sent to [id number] [address] [facility]  [address] [facility] [address]    Hours:  Closed 10-16-08 business to [facility] Phone:  [phone]     amoxicillin 500 MG capsule                Primary Care Provider Office Phone # Fax #    Cinthya NIURKA Franklin [phone] [address] [facility] [address]        Equal Access to Services     BRIA MICHELLE AH: Robbie lukeo Sosadie, waaxda luqadaha, qaybta kaalmajadiel sales. So [place] [phone].    ATENCIÓN: Si habla español, tiene a berger disposición servicios gratuitos de asistencia lingüística. Elisha al [phone].    We comply with applicable federal civil rights laws and [place] laws. We do not discriminate on the basis of race, color, national origin, age, disability, sex, sexual orientation, or gender identity.            Thank you!     Thank you for choosing [facility]  for your care. Our goal is always to provide you with excellent care.  Hearing back from our patients is one way we can continue to improve our services. Please take a few minutes to complete the written survey that you may receive in the mail after your visit with us. Thank you!             Your Updated Medication List - Protect others around you: Learn how to safely use, store and throw away your medicines at www.disposemymeds.org.          This list is accurate as of 3/27/18  2:19 PM.  Always use your most recent med list.                   Brand Name Dispense Instructions for use Diagnosis    amoxicillin 500 MG capsule    AMOXIL    20 capsule    Take 1 capsule (500 mg) by mouth 2 times daily for 10 days    Acute streptococcal pharyngitis       aspirin 81 MG tablet      Take 1 tablet by mouth daily.        blood glucose monitoring lancets     102 Box    1 each daily Use to test blood sugar 1 times daily or as directed.    Type 2 diabetes mellitus without complication, unspecified long term insulin use status (H)       blood glucose monitoring test strip    no brand specified    3 Box    Use to test blood sugar 3 times daily or as directed. Compatible with [employer] monitoring device    Type 2 diabetes mellitus without complication, unspecified long term insulin use status (H)       CINNAMON PO           lisinopril 10 MG tablet    PRINIVIL/ZESTRIL    30 tablet    TAKE 1 TABLET BY MOUTH ONCE DAILY    Hypertension goal BP (blood pressure) < 140/90, Type 2 diabetes mellitus without complication, unspecified long term insulin use status (H)       metFORMIN 1000 MG tablet    GLUCOPHAGE    180 tablet    Take 1 tablet (1,000 mg) by mouth 2 times daily (with meals)    Type 2 diabetes mellitus without complication, unspecified long term insulin use status (H)       NAPROXEN SODIUM PO      Take 100 mg by mouth        OMEGA-3 FISH OIL PO           order for DME      Equipment being ordered: CPAP Osmar Angel  received a [employer] DreamStation Auto CPAP Auto. Pressures were set at Auto  11 - 15 cm H2O.        simvastatin 10 MG tablet    ZOCOR    90 tablet    TAKE ONE TABLET BY MOUTH AT BEDTIME    Hyperlipidemia LDL goal <100, Type 2 diabetes mellitus without complication, without long-term current use of insulin (H)       triamcinolone 0.1 % cream    KENALOG    80 g    Apply topically 2 times daily Apply sparingly to affected area three times daily as needed    Dermatitis       VITAMIN D (CHOLECALCIFEROL) PO      Take by mouth daily

## 2018-04-16 DIAGNOSIS — I10 HYPERTENSION GOAL BP (BLOOD PRESSURE) < 140/90: Chronic | ICD-10-CM

## 2018-04-16 DIAGNOSIS — E11.9 TYPE 2 DIABETES MELLITUS WITHOUT COMPLICATION, UNSPECIFIED LONG TERM INSULIN USE STATUS: ICD-10-CM

## 2018-04-17 RX ORDER — LISINOPRIL 10 MG/1
10 TABLET ORAL DAILY
Qty: 90 TABLET | Refills: 1 | Status: SHIPPED | OUTPATIENT
Start: 2018-04-17 | End: 2018-10-15

## 2018-04-26 ENCOUNTER — TELEPHONE (OUTPATIENT)
Dept: FAMILY MEDICINE | Facility: CLINIC | Age: 48
End: 2018-04-26

## 2018-04-26 NOTE — TELEPHONE ENCOUNTER
Notified pt he is due for Diabetes Check. He states he is busy at work and will schedule when able.  Elham Pollack, CMA

## 2018-06-27 VITALS — RESPIRATION RATE: 18 BRPM | DIASTOLIC BLOOD PRESSURE: 79 MMHG | TEMPERATURE: 101.5 F | SYSTOLIC BLOOD PRESSURE: 112 MMHG

## 2018-08-24 DIAGNOSIS — E11.9 TYPE 2 DIABETES MELLITUS WITHOUT COMPLICATION, UNSPECIFIED LONG TERM INSULIN USE STATUS: ICD-10-CM

## 2018-08-24 NOTE — TELEPHONE ENCOUNTER
"Requested Prescriptions   Pending Prescriptions Disp Refills     metFORMIN (GLUCOPHAGE) 1000 MG tablet [Pharmacy Med Name: METFORMIN 1,000 MG  TAB FANI] 180 tablet 0     Sig: TAKE 1 TABLET (1,000 MG) BY MOUTH 2 TIMES DAILY (WITH MEALS)    Biguanide Agents Failed    8/24/2018  1:34 PM       Failed - Patient has documented A1c within the specified period of time.    If HgbA1C is 8 or greater, it needs to be on file within the past 3 months.  If less than 8, must be on file within the past 6 months.     Recent Labs   Lab Test  02/26/18   1003   A1C  8.1*            Passed - Blood pressure less than 140/90 in past 6 months    BP Readings from Last 3 Encounters:   06/27/18 112/79   10/11/17 127/85   08/30/17 139/86                Passed - Patient has documented LDL within the past 12 mos.    Recent Labs   Lab Test  02/26/18   1003   LDL  64            Passed - Patient has had a Microalbumin in the past 15 mos.    Recent Labs   Lab Test  02/26/18   1003   MICROL  57   UMALCR  24.03*            Passed - Patient is age 10 or older       Passed - Patient's CR is NOT>1.4 OR Patient's EGFR is NOT<45 within past 12 mos.    Recent Labs   Lab Test  02/26/18   1003   GFRESTIMATED  85   GFRESTBLACK  >90       Recent Labs   Lab Test  02/26/18   1003   CR  0.95            Passed - Patient does NOT have a diagnosis of CHF.       Passed - Recent (6 mo) or future (30 days) visit within the authorizing provider's specialty    Patient had office visit in the last 6 months or has a visit in the next 30 days with authorizing provider or within the authorizing provider's specialty.  See \"Patient Info\" tab in inbasket, or \"Choose Columns\" in Meds & Orders section of the refill encounter.            Last Written Prescription Date:  3/5/18  Last Fill Quantity: 180,  # refills: 1   Last office visit: 3/27/2018 with prescribing provider:  THIAGO   Future Office Visit:      "

## 2018-08-27 NOTE — TELEPHONE ENCOUNTER
"Medication is being filled for 1 time refill only due to:  Patient needs labs .. Patient needs to be seen because last diabetic office visit 2/14/17.    Due for Office Visit - last diabetic recheck 2/14/17. Called and spoke with pt: \"I'll have to call back and make an appointment, I'm at work right now.\"    Geni AUGUSTINE RN      "

## 2018-08-31 ENCOUNTER — TELEPHONE (OUTPATIENT)
Dept: FAMILY MEDICINE | Facility: CLINIC | Age: 48
End: 2018-08-31

## 2018-08-31 DIAGNOSIS — Z00.00 HEALTH CARE MAINTENANCE: ICD-10-CM

## 2018-08-31 DIAGNOSIS — Z00.00 HEALTH CARE MAINTENANCE: Primary | ICD-10-CM

## 2018-08-31 LAB
CHOLEST SERPL-MCNC: 147 MG/DL
HBA1C MFR BLD: 9.3 % (ref 0–5.6)
HDLC SERPL-MCNC: 52 MG/DL
LDLC SERPL CALC-MCNC: 82 MG/DL
NONHDLC SERPL-MCNC: 95 MG/DL
TRIGL SERPL-MCNC: 64 MG/DL

## 2018-08-31 PROCEDURE — 36415 COLL VENOUS BLD VENIPUNCTURE: CPT | Performed by: NURSE PRACTITIONER

## 2018-08-31 PROCEDURE — 83036 HEMOGLOBIN GLYCOSYLATED A1C: CPT | Performed by: NURSE PRACTITIONER

## 2018-08-31 PROCEDURE — 80061 LIPID PANEL: CPT | Performed by: NURSE PRACTITIONER

## 2018-08-31 NOTE — TELEPHONE ENCOUNTER
Patient here today as was told to get diabetic labs but no orders put in, Hemoglobin A1C and lipid ordered, patient is here and fasting, told the   orders are in and patient can be put in lab schedule now to complete.    BLANE Olmos

## 2018-09-04 DIAGNOSIS — E11.9 TYPE 2 DIABETES MELLITUS WITHOUT COMPLICATION, UNSPECIFIED LONG TERM INSULIN USE STATUS: ICD-10-CM

## 2018-09-24 DIAGNOSIS — E11.9 TYPE 2 DIABETES MELLITUS WITHOUT COMPLICATION, UNSPECIFIED LONG TERM INSULIN USE STATUS: ICD-10-CM

## 2018-09-24 NOTE — TELEPHONE ENCOUNTER
"Requested Prescriptions   Pending Prescriptions Disp Refills     metFORMIN (GLUCOPHAGE) 1000 MG tablet [Pharmacy Med Name: METFORMIN 1,000 MG  TAB FANI] 60 tablet 0     Sig: TAKE ONE TABLET BY MOUTH TWICE DAILY WITH MEALS    Biguanide Agents Failed    9/24/2018  8:07 AM       Failed - Recent (6 mo) or future (30 days) visit within the authorizing provider's specialty    Patient had office visit in the last 6 months or has a visit in the next 30 days with authorizing provider or within the authorizing provider's specialty.  See \"Patient Info\" tab in inbasket, or \"Choose Columns\" in Meds & Orders section of the refill encounter.           Passed - Blood pressure less than 140/90 in past 6 months    BP Readings from Last 3 Encounters:   06/27/18 112/79   10/11/17 127/85   08/30/17 139/86                Passed - Patient has documented LDL within the past 12 mos.    Recent Labs   Lab Test  08/31/18   1225   LDL  82            Passed - Patient has had a Microalbumin in the past 15 mos.    Recent Labs   Lab Test  02/26/18   1003   MICROL  57   UMALCR  24.03*            Passed - Patient is age 10 or older       Passed - Patient has documented A1c within the specified period of time.    If HgbA1C is 8 or greater, it needs to be on file within the past 3 months.  If less than 8, must be on file within the past 6 months.     Recent Labs   Lab Test  08/31/18   1225   A1C  9.3*            Passed - Patient's CR is NOT>1.4 OR Patient's EGFR is NOT<45 within past 12 mos.    Recent Labs   Lab Test  02/26/18   1003   GFRESTIMATED  85   GFRESTBLACK  >90       Recent Labs   Lab Test  02/26/18   1003   CR  0.95            Passed - Patient does NOT have a diagnosis of CHF.        Last Written Prescription Date:  9/4/18  Last Fill Quantity: 60,  # refills: 0   Last office visit: 3/27/2018 with prescribing provider:     Future Office Visit:      "

## 2018-10-03 ENCOUNTER — TELEPHONE (OUTPATIENT)
Dept: FAMILY MEDICINE | Facility: CLINIC | Age: 48
End: 2018-10-03

## 2018-10-03 NOTE — TELEPHONE ENCOUNTER
Panel Management Review      Patient has the following on his problem list:     Diabetes    ASA: Passed    Last A1C  Lab Results   Component Value Date    A1C 9.3 08/31/2018    A1C 8.1 02/26/2018    A1C 7.6 02/14/2017    A1C 8.0 11/28/2016    A1C 6.7 09/14/2015     A1C tested: FAILED    Last LDL:    Lab Results   Component Value Date    CHOL 147 08/31/2018     Lab Results   Component Value Date    HDL 52 08/31/2018     Lab Results   Component Value Date    LDL 82 08/31/2018     Lab Results   Component Value Date    TRIG 64 08/31/2018     Lab Results   Component Value Date    CHOLHDLRATIO 2.9 09/14/2015     Lab Results   Component Value Date    NHDL 95 08/31/2018       Is the patient on a Statin? YES             Is the patient on Aspirin? YES    Medications     HMG CoA Reductase Inhibitors    simvastatin (ZOCOR) 10 MG tablet    Salicylates    aspirin 81 MG tablet          Last three blood pressure readings:  BP Readings from Last 3 Encounters:   06/27/18 112/79   10/11/17 127/85   08/30/17 139/86       Date of last diabetes office visit: 2/2017     Tobacco History:     History   Smoking Status     Former Smoker     Quit date: 2/14/2005   Smokeless Tobacco     Never Used           Composite cancer screening  Chart review shows that this patient is due/due soon for the following None  Summary:    Patient is due/failing the following:   Dm visit    Action needed:   Patient needs office visit for DM.    Type of outreach:    Sent [employer] message.    Questions for provider review:    None                                                                                                                                    Jessica BANDA [facility]     Chart routed to Care Team .

## 2018-10-11 DIAGNOSIS — G47.33 OSA (OBSTRUCTIVE SLEEP APNEA): Primary | ICD-10-CM

## 2018-10-15 DIAGNOSIS — I10 HYPERTENSION GOAL BP (BLOOD PRESSURE) < 140/90: Chronic | ICD-10-CM

## 2018-10-15 DIAGNOSIS — E11.9 TYPE 2 DIABETES MELLITUS WITHOUT COMPLICATION (H): ICD-10-CM

## 2018-10-15 RX ORDER — LISINOPRIL 10 MG/1
TABLET ORAL
Qty: 90 TABLET | Refills: 1 | Status: SHIPPED | OUTPATIENT
Start: 2018-10-15 | End: 2019-01-25

## 2018-10-25 DIAGNOSIS — E11.9 TYPE 2 DIABETES MELLITUS WITHOUT COMPLICATION (H): ICD-10-CM

## 2018-10-25 NOTE — TELEPHONE ENCOUNTER
"Requested Prescriptions   Pending Prescriptions Disp Refills     metFORMIN (GLUCOPHAGE) 1000 MG tablet [Pharmacy Med Name: METFORMIN 1,000 MG  TAB FANI] 60 tablet 0     Sig: TAKE ONE TABLET BY MOUTH TWICE DAILY WITH MEALS    Biguanide Agents Failed    10/25/2018 12:55 PM       Failed - Recent (6 mo) or future (30 days) visit within the authorizing provider's specialty    Patient had office visit in the last 6 months or has a visit in the next 30 days with authorizing provider or within the authorizing provider's specialty.  See \"Patient Info\" tab in inbasket, or \"Choose Columns\" in Meds & Orders section of the refill encounter.           Passed - Blood pressure less than 140/90 in past 6 months    BP Readings from Last 3 Encounters:   06/27/18 112/79   10/11/17 127/85   08/30/17 139/86                Passed - Patient has documented LDL within the past 12 mos.    Recent Labs   Lab Test  08/31/18   1225   LDL  82            Passed - Patient has had a Microalbumin in the past 15 mos.    Recent Labs   Lab Test  02/26/18   1003   MICROL  57   UMALCR  24.03*            Passed - Patient is age 10 or older       Passed - Patient has documented A1c within the specified period of time.    If HgbA1C is 8 or greater, it needs to be on file within the past 3 months.  If less than 8, must be on file within the past 6 months.     Recent Labs   Lab Test  08/31/18   1225   A1C  9.3*            Passed - Patient's CR is NOT>1.4 OR Patient's EGFR is NOT<45 within past 12 mos.    Recent Labs   Lab Test  02/26/18   1003   GFRESTIMATED  85   GFRESTBLACK  >90       Recent Labs   Lab Test  02/26/18   1003   CR  0.95            Passed - Patient does NOT have a diagnosis of CHF.      metFORMIN (GLUCOPHAGE) 1000 MG tablet  Last Written Prescription Date:  09/24/2018  Last Fill Quantity: 60 tablet,  # refills: 0   Last office visit: 3/27/2018 with prescribing provider:  LORRAINE Reece   Future Office Visit:      Rosalind Miller RT (R) (M)      "

## 2018-11-06 ENCOUNTER — TELEPHONE (OUTPATIENT)
Dept: FAMILY MEDICINE | Facility: CLINIC | Age: 48
End: 2018-11-06

## 2018-11-23 DIAGNOSIS — E11.9 TYPE 2 DIABETES MELLITUS WITHOUT COMPLICATION (H): ICD-10-CM

## 2018-11-23 NOTE — TELEPHONE ENCOUNTER
"Requested Prescriptions   Pending Prescriptions Disp Refills     metFORMIN (GLUCOPHAGE) 1000 MG tablet [Pharmacy Med Name: METFORMIN 1,000 MG  TAB FANI] 60 tablet 0     Sig: TAKE ONE TABLET BY MOUTH TWICE DAILY WITH MEALS-MUST SEE DR FOR REFILLS    Biguanide Agents Failed    11/23/2018 11:59 AM       Failed - Recent (6 mo) or future (30 days) visit within the authorizing provider's specialty    Patient had office visit in the last 6 months or has a visit in the next 30 days with authorizing provider or within the authorizing provider's specialty.  See \"Patient Info\" tab in inbasket, or \"Choose Columns\" in Meds & Orders section of the refill encounter.           Passed - Blood pressure less than 140/90 in past 6 months    BP Readings from Last 3 Encounters:   06/27/18 112/79   10/11/17 127/85   08/30/17 139/86                Passed - Patient has documented LDL within the past 12 mos.    Recent Labs   Lab Test  08/31/18   1225   LDL  82            Passed - Patient has had a Microalbumin in the past 15 mos.    Recent Labs   Lab Test  02/26/18   1003   MICROL  57   UMALCR  24.03*            Passed - Patient is age 10 or older       Passed - Patient has documented A1c within the specified period of time.    If HgbA1C is 8 or greater, it needs to be on file within the past 3 months.  If less than 8, must be on file within the past 6 months.     Recent Labs   Lab Test  08/31/18   1225   A1C  9.3*            Passed - Patient's CR is NOT>1.4 OR Patient's EGFR is NOT<45 within past 12 mos.    Recent Labs   Lab Test  02/26/18   1003   GFRESTIMATED  85   GFRESTBLACK  >90       Recent Labs   Lab Test  02/26/18   1003   CR  0.95            Passed - Patient does NOT have a diagnosis of CHF.        metFORMIN (GLUCOPHAGE) 1000 MG tablet  Last Written Prescription Date:  10/25/2018  Last Fill Quantity: 60 tablet,  # refills: 0   Last office visit: 3/27/2018 with prescribing provider:  LORRAINE Reece   Future Office Visit:      Rosalind" Angela VILLATORO (R) (M)

## 2018-12-01 DIAGNOSIS — E11.9 TYPE 2 DIABETES MELLITUS WITHOUT COMPLICATION (H): ICD-10-CM

## 2018-12-02 NOTE — TELEPHONE ENCOUNTER
"Requested Prescriptions   Pending Prescriptions Disp Refills     ACCU-CHEK SMARTVIEW test strip [Pharmacy Med Name: ACCU-CHEK SMART     GIL ROCH] 100 each 2    Last Written Prescription Date:  12/29/16  Last Fill Quantity: 100,  # refills: 2   Last office visit: 3/27/2018 with prescribing provider:  REILLY Reece Future Office Visit:     Sig: USE TO TEST BLOOD SUGAR 3 TIMES DAILY OR AS DIRECTED.    Diabetic Supplies Protocol Failed    12/1/2018  9:11 AM       Failed - Recent (6 mo) or future (30 days) visit within the authorizing provider's specialty    Patient had office visit in the last 6 months or has a visit in the next 30 days with authorizing provider.  See \"Patient Info\" tab in inbasket, or \"Choose Columns\" in Meds & Orders section of the refill encounter.           Passed - Patient is 18 years of age or older          "

## 2018-12-05 ENCOUNTER — OFFICE VISIT (OUTPATIENT)
Dept: FAMILY MEDICINE | Facility: CLINIC | Age: 48
End: 2018-12-05
Payer: COMMERCIAL

## 2018-12-05 VITALS
SYSTOLIC BLOOD PRESSURE: 114 MMHG | TEMPERATURE: 98.2 F | BODY MASS INDEX: 33.25 KG/M2 | RESPIRATION RATE: 14 BRPM | HEART RATE: 80 BPM | WEIGHT: 252 LBS | DIASTOLIC BLOOD PRESSURE: 58 MMHG

## 2018-12-05 DIAGNOSIS — E11.9 TYPE 2 DIABETES MELLITUS WITHOUT COMPLICATION, WITHOUT LONG-TERM CURRENT USE OF INSULIN (H): Primary | ICD-10-CM

## 2018-12-05 LAB
ALBUMIN SERPL-MCNC: 3.8 G/DL (ref 3.4–5)
ALP SERPL-CCNC: 45 U/L (ref 40–150)
ALT SERPL W P-5'-P-CCNC: 29 U/L (ref 0–70)
ANION GAP SERPL CALCULATED.3IONS-SCNC: 6 MMOL/L (ref 3–14)
AST SERPL W P-5'-P-CCNC: 14 U/L (ref 0–45)
BILIRUB SERPL-MCNC: 0.5 MG/DL (ref 0.2–1.3)
BUN SERPL-MCNC: 22 MG/DL (ref 7–30)
CALCIUM SERPL-MCNC: 9.3 MG/DL (ref 8.5–10.1)
CHLORIDE SERPL-SCNC: 103 MMOL/L (ref 94–109)
CO2 SERPL-SCNC: 27 MMOL/L (ref 20–32)
CREAT SERPL-MCNC: 0.95 MG/DL (ref 0.66–1.25)
GFR SERPL CREATININE-BSD FRML MDRD: 84 ML/MIN/1.7M2
GLUCOSE SERPL-MCNC: 135 MG/DL (ref 70–99)
HBA1C MFR BLD: 7.7 % (ref 0–5.6)
POTASSIUM SERPL-SCNC: 4.1 MMOL/L (ref 3.4–5.3)
PROT SERPL-MCNC: 7.4 G/DL (ref 6.8–8.8)
SODIUM SERPL-SCNC: 136 MMOL/L (ref 133–144)
TSH SERPL DL<=0.005 MIU/L-ACNC: 1.1 MU/L (ref 0.4–4)

## 2018-12-05 PROCEDURE — 83036 HEMOGLOBIN GLYCOSYLATED A1C: CPT | Performed by: NURSE PRACTITIONER

## 2018-12-05 PROCEDURE — 84443 ASSAY THYROID STIM HORMONE: CPT | Performed by: NURSE PRACTITIONER

## 2018-12-05 PROCEDURE — 99213 OFFICE O/P EST LOW 20 MIN: CPT | Performed by: NURSE PRACTITIONER

## 2018-12-05 PROCEDURE — 80053 COMPREHEN METABOLIC PANEL: CPT | Performed by: NURSE PRACTITIONER

## 2018-12-05 PROCEDURE — 36415 COLL VENOUS BLD VENIPUNCTURE: CPT | Performed by: NURSE PRACTITIONER

## 2018-12-05 RX ORDER — LANCETS
1 EACH MISCELLANEOUS DAILY
Qty: 102 EACH | Refills: 11 | Status: SHIPPED | OUTPATIENT
Start: 2018-12-05 | End: 2020-03-02

## 2018-12-05 NOTE — NURSING NOTE
"Chief Complaint   Patient presents with     Diabetes       Initial /58 (BP Location: Right arm, Cuff Size: Adult Large)  Pulse 80  Temp 98.2  F (36.8  C) (Tympanic)  Resp 14  Wt 252 lb (114.3 kg)  BMI 33.25 kg/m2 Estimated body mass index is 33.25 kg/(m^2) as calculated from the following:    Height as of 10/11/17: 6' 1\" (1.854 m).    Weight as of this encounter: 252 lb (114.3 kg).    Patient presents to the clinic using No DME    Health Maintenance that is potentially due pending provider review:  NONE    n/a    Is there anyone who you would like to be able to receive your results? Not Applicable  If yes have patient fill out MIGNON  Rufino Sanches M.A.        "

## 2018-12-05 NOTE — PROGRESS NOTES
SUBJECTIVE:   Osmar Angel is a 48 year old male who presents to clinic today for the following health issues:    Diabetes Follow-up    Patient is checking blood sugars: once daily.  Results are as follows:         AM: 130-150, sometimes in evening before dinner will be below 130    Diabetic concerns: None     Symptoms of hypoglycemia (low blood sugar): none     Paresthesias (numbness or burning in feet) or sores: No     Date of last diabetic eye exam: Just did couple months ago     BP Readings from Last 2 Encounters:   18 112/79   10/11/17 127/85     Hemoglobin A1C (%)   Date Value   2018 9.3 (H)   2018 8.1 (H)     LDL Cholesterol Calculated (mg/dL)   Date Value   2018 82   2018 64       Diabetes Management Resources    Amount of exercise or physical activity: Physical work     Problems taking medications regularly: No    Medication side effects: none    Diet: regular (no restrictions)    Made significant dietary changes and lost weight since last A1C    Problem list and histories reviewed & adjusted, as indicated.  Additional history: as documented    Patient Active Problem List   Diagnosis     Profound impairment, one eye, impairment level not further specified     Pain in joint, upper arm     External hemorrhoids     Hypertension goal BP (blood pressure) < 140/90     Type 2 diabetes mellitus without complications (H)     Hyperlipidemia LDL goal <100     Obesity, Class II, BMI 35-39.9, with comorbidity     VERONA (obstructive sleep apnea)-AHI 71     Past Surgical History:   Procedure Laterality Date     C REPAIR ING HERNIA, INFANT,REDUC      bilateral     ROTATOR CUFF REPAIR RT/LT      right arthroscopic     SURGICAL HISTORY OF -       right knee arthroscopy     SURGICAL HISTORY OF -       umbilical hernia repair     VASECTOMY         Social History   Substance Use Topics     Smoking status: Former Smoker     Quit date: 2005     Smokeless tobacco: Never Used      Alcohol use 0.0 oz/week     0 Standard drinks or equivalent per week      Comment: rarely     Family History   Problem Relation Age of Onset     Diabetes Maternal Grandfather      C.A.D. Paternal Grandmother      C.A.D. Paternal Grandfather      Cardiovascular Father      a. fib     Cerebrovascular Disease Father      Heart Disease Father      pacemaker     Asthma No family hx of      Hypertension No family hx of      Breast Cancer No family hx of      Cancer - colorectal No family hx of      Prostate Cancer No family hx of          Current Outpatient Prescriptions   Medication Sig Dispense Refill     aspirin 81 MG tablet Take 1 tablet by mouth daily.       blood glucose monitoring (ACCU-CHEK FASTCLIX) lancets 1 each daily Use to test blood sugar 1 times daily or as directed. 102 each 11     blood glucose monitoring (ACCU-CHEK SMARTVIEW) test strip 1 strip by In Vitro route 3 times daily 100 each 11     CINNAMON PO        lisinopril (PRINIVIL/ZESTRIL) 10 MG tablet TAKE ONE TABLET BY MOUTH ONE TIME DAILY 90 tablet 1     metFORMIN (GLUCOPHAGE) 1000 MG tablet Take 1 tablet (1,000 mg) by mouth 2 times daily (with meals) 60 tablet 5     NAPROXEN SODIUM PO Take 100 mg by mouth as needed        Omega-3 Fatty Acids (OMEGA-3 FISH OIL PO)        order for DME Equipment being ordered: CPAP Osmar Angel  received a [employer] DreamStation Auto CPAP Auto. Pressures were set at Auto 11 - 15 cm H2O.       simvastatin (ZOCOR) 10 MG tablet TAKE ONE TABLET BY MOUTH AT BEDTIME 90 tablet 3     VITAMIN D, CHOLECALCIFEROL, PO Take by mouth daily       [DISCONTINUED] metFORMIN (GLUCOPHAGE) 1000 MG tablet TAKE ONE TABLET BY MOUTH TWICE DAILY WITH MEALS-MUST SEE DR FOR REFILLS 60 tablet 0     Allergies   Allergen Reactions     Wellbutrin [Bupropion Hcl] Swelling     Labs reviewed in EPIC    Reviewed and updated as needed this visit by clinical staff       Reviewed and updated as needed this visit by Provider          ROS:  Constitutional, HEENT, cardiovascular, pulmonary, gi and gu systems are negative, except as otherwise noted.    OBJECTIVE:     /58 (BP Location: Right arm, Cuff Size: Adult Large)  Pulse 80  Temp 98.2  F (36.8  C) (Tympanic)  Resp 14  Wt 252 lb (114.3 kg)  BMI 33.25 kg/m2  Body mass index is 33.25 kg/(m^2).  GENERAL: healthy, alert and no distress  NECK: no adenopathy, no asymmetry, masses, or scars and thyroid normal to palpation  RESP: lungs clear to auscultation - no rales, rhonchi or wheezes  CV: regular rate and rhythm, normal S1 S2, no S3 or S4, no murmur, click or rub, no peripheral edema and peripheral pulses strong  ABDOMEN: soft, nontender, no hepatosplenomegaly, no masses and bowel sounds normal  NEURO: Normal strength and tone, mentation intact and speech normal  PSYCH: mentation appears normal, affect normal/bright    Diagnostic Test Results:  Results for orders placed or performed in visit on 12/05/18 (from the past 24 hour(s))   Hemoglobin A1c   Result Value Ref Range    Hemoglobin A1C 7.7 (H) 0 - 5.6 %       ASSESSMENT/PLAN:     1. Type 2 diabetes mellitus without complication, without long-term current use of insulin (H)  Improved.  Continue with dietary and physical activity changes.  Metformin refilled. Recheck in 3 months.   - metFORMIN (GLUCOPHAGE) 1000 MG tablet; Take 1 tablet (1,000 mg) by mouth 2 times daily (with meals)  Dispense: 60 tablet; Refill: 5  - blood glucose monitoring (ACCU-CHEK FASTCLIX) lancets; 1 each daily Use to test blood sugar 1 times daily or as directed.  Dispense: 102 each; Refill: 11  - blood glucose monitoring (ACCU-CHEK SMARTVIEW) test strip; 1 strip by In Vitro route 3 times daily  Dispense: 100 each; Refill: 11  - Hemoglobin A1c  - TSH with free T4 reflex  - Comprehensive metabolic panel    Home care instructions were reviewed with the patient. The risks, benefits and treatment options of prescribed medications or other treatments have been  discussed with the patient. The patient verbalized their understanding and should call or follow up if no improvement or if they develop further problems.    NIURKA Escobar [facility]

## 2018-12-05 NOTE — MR AVS SNAPSHOT
After Visit Summary   12/5/2018    Osmar Angel    MRN: [id number]           Patient Information     Date Of Birth          1970        Visit Information        Provider Department      12/5/2018 9:40 AM Cinthya Johnson APRN CNP [facility]        Today's Diagnoses     Type 2 diabetes mellitus without complication, without long-term current use of insulin (H)    -  1       Follow-ups after your visit        Follow-up notes from your care team     Return in about 3 months (around 3/5/2019) for Diabetes Check.      Who to contact     If you have questions or need follow up information about today's clinic visit or your schedule please contact [facility] directly at [phone].  Normal or non-critical lab and imaging results will be communicated to you by TruQChart, letter or phone within 4 business days after the clinic has received the results. If you do not hear from us within 7 days, please contact the clinic through CartMomot or phone. If you have a critical or abnormal lab result, we will notify you by phone as soon as possible.  Submit refill requests through Fliiby or call your pharmacy and they will forward the refill request to us. Please allow 3 business days for your refill to be completed.          Additional Information About Your Visit        MyChart Information     Fliiby gives you secure access to your electronic health record. If you see a primary care provider, you can also send messages to your care team and make appointments. If you have questions, please call your primary care clinic.  If you do not have a primary care provider, please call [phone] and they will assist you.        Care EveryWhere ID     This is your Care EveryWhere ID. This could be used by other organizations to access your [place] medical records  [id number]        Your Vitals Were     Pulse Temperature Respirations BMI (Body Mass Index)          80 98.2   F (36.8  C) (Tympanic) 14 33.25 kg/m2         Blood Pressure from Last 3 Encounters:   12/05/18 114/58   06/27/18 112/79   10/11/17 127/85    Weight from Last 3 Encounters:   12/05/18 252 lb (114.3 kg)   10/11/17 265 lb (120.2 kg)   08/30/17 270 lb 12.8 oz (122.8 kg)              We Performed the Following     Comprehensive metabolic panel     Hemoglobin A1c     TSH with free T4 reflex          Today's Medication Changes          These changes are accurate as of 12/5/18 10:11 AM.  If you have any questions, ask your nurse or doctor.               These medicines have changed or have updated prescriptions.        Dose/Directions    blood glucose monitoring test strip   Commonly known as:  ACCU-CHEK SMARTVIEW   This may have changed:  additional instructions   Used for:  Type 2 diabetes mellitus without complication, without long-term current use of insulin (H)   Changed by:  Cinthya Johnson APRN CNP        Dose:  1 strip   1 strip by In Vitro route 3 times daily   Quantity:  100 each   Refills:  11       metFORMIN 1000 MG tablet   Commonly known as:  GLUCOPHAGE   This may have changed:  See the new instructions.   Used for:  Type 2 diabetes mellitus without complication, without long-term current use of insulin (H)   Changed by:  Cinthya Johnson APRN CNP        Dose:  1000 mg   Take 1 tablet (1,000 mg) by mouth 2 times daily (with meals)   Quantity:  60 tablet   Refills:  5         Stop taking these medicines if you haven't already. Please contact your care team if you have questions.     triamcinolone 0.1 % external cream   Commonly known as:  KENALOG   Stopped by:  Cinthya Johnson APRN CNP                Where to get your medicines      These medications were sent to [id number] [place], [place] - [address]  [address] [facility] [address]    Hours:  Closed 10-16-08 business to [facility] Phone:  [phone]     blood glucose monitoring lancets    blood glucose  monitoring test strip    metFORMIN 1000 MG tablet                Primary Care Provider Office Phone # Fax #    NIURKA Villarreal [phone] [address] [place] [address]        Equal Access to Services     BRIA MICHELLE : Hademilee herbert timmons marlyo Sotyronali, waaxda luqadaha, qaybta kaalmada adeegyada, jadiel ferrarin den abraham bora sanchez. So [facility] [phone].    ATENCIÓN: Si habla español, tiene a berger disposición servicios gratuitos de asistencia lingüística. Llame al [phone].    We comply with applicable federal civil rights laws and [place] laws. We do not discriminate on the basis of race, color, national origin, age, disability, sex, sexual orientation, or gender identity.            Thank you!     Thank you for choosing [facility]  for your care. Our goal is always to provide you with excellent care. Hearing back from our patients is one way we can continue to improve our services. Please take a few minutes to complete the written survey that you may receive in the mail after your visit with us. Thank you!             Your Updated Medication List - Protect others around you: Learn how to safely use, store and throw away your medicines at www.disposemymeds.org.          This list is accurate as of 12/5/18 10:11 AM.  Always use your most recent med list.                   Brand Name Dispense Instructions for use Diagnosis    aspirin 81 MG tablet    ASA     Take 1 tablet by mouth daily.        blood glucose monitoring lancets     102 each    1 each daily Use to test blood sugar 1 times daily or as directed.    Type 2 diabetes mellitus without complication, without long-term current use of insulin (H)       blood glucose monitoring test strip    ACCU-CHEK SMARTVIEW    100 each    1 strip by In Vitro route 3 times daily    Type 2 diabetes mellitus without complication, without long-term current use of insulin (H)       CINNAMON PO           lisinopril  10 MG tablet    PRINIVIL/ZESTRIL    90 tablet    TAKE ONE TABLET BY MOUTH ONE TIME DAILY    Hypertension goal BP (blood pressure) < 140/90, Type 2 diabetes mellitus without complication (H)       metFORMIN 1000 MG tablet    GLUCOPHAGE    60 tablet    Take 1 tablet (1,000 mg) by mouth 2 times daily (with meals)    Type 2 diabetes mellitus without complication, without long-term current use of insulin (H)       NAPROXEN SODIUM PO      Take 100 mg by mouth as needed        OMEGA-3 FISH OIL PO           order for DME      Equipment being ordered: CPAP Osmar Angel  received a [employer] DreamStation Auto CPAP Auto. Pressures were set at Auto 11 - 15 cm H2O.        simvastatin 10 MG tablet    ZOCOR    90 tablet    TAKE ONE TABLET BY MOUTH AT BEDTIME    Hyperlipidemia LDL goal <100, Type 2 diabetes mellitus without complication, without long-term current use of insulin (H)       VITAMIN D (CHOLECALCIFEROL) PO      Take by mouth daily

## 2019-01-25 DIAGNOSIS — E78.5 HYPERLIPIDEMIA LDL GOAL <100: ICD-10-CM

## 2019-01-25 DIAGNOSIS — E11.9 TYPE 2 DIABETES MELLITUS WITHOUT COMPLICATION (H): ICD-10-CM

## 2019-01-25 DIAGNOSIS — E11.9 TYPE 2 DIABETES MELLITUS WITHOUT COMPLICATION, WITHOUT LONG-TERM CURRENT USE OF INSULIN (H): ICD-10-CM

## 2019-01-25 DIAGNOSIS — I10 HYPERTENSION GOAL BP (BLOOD PRESSURE) < 140/90: Chronic | ICD-10-CM

## 2019-01-25 RX ORDER — LISINOPRIL 10 MG/1
10 TABLET ORAL DAILY
Qty: 90 TABLET | Refills: 1 | Status: SHIPPED | OUTPATIENT
Start: 2019-01-25 | End: 2019-04-09

## 2019-01-25 RX ORDER — SIMVASTATIN 10 MG
TABLET ORAL
Qty: 90 TABLET | Refills: 1 | Status: SHIPPED | OUTPATIENT
Start: 2019-01-25 | End: 2019-04-09

## 2019-01-25 NOTE — TELEPHONE ENCOUNTER
"Requested Prescriptions   Pending Prescriptions Disp Refills     lisinopril (PRINIVIL/ZESTRIL) 10 MG tablet 90 tablet 1     Sig: Take 1 tablet (10 mg) by mouth daily    ACE Inhibitors (Including Combos) Protocol Passed - 1/25/2019 12:50 PM       Passed - Blood pressure under 140/90 in past 12 months    BP Readings from Last 3 Encounters:   12/05/18 114/58   06/27/18 112/79   10/11/17 127/85                Passed - Recent (12 mo) or future (30 days) visit within the authorizing provider's specialty    Patient had office visit in the last 12 months or has a visit in the next 30 days with authorizing provider or within the authorizing provider's specialty.  See \"Patient Info\" tab in inbasket, or \"Choose Columns\" in Meds & Orders section of the refill encounter.             Passed - Medication is active on med list       Passed - Patient is age 18 or older       Passed - Normal serum creatinine on file in past 12 months    Recent Labs   Lab Test 12/05/18  0955   CR 0.95            Passed - Normal serum potassium on file in past 12 months    Recent Labs   Lab Test 12/05/18  0955   POTASSIUM 4.1           lisinopril (PRINIVIL/ZESTRIL) 10 MG tablet  Last Written Prescription Date:  10/15/2018  Last Fill Quantity: 90 tablet,  # refills: 1   Last office visit: 12/5/2018 with prescribing provider:  TRACE Johnson   Future Office Visit:      Rosalind VILLATORO (R) (M)      "

## 2019-01-25 NOTE — TELEPHONE ENCOUNTER
"Requested Prescriptions   Pending Prescriptions Disp Refills     simvastatin (ZOCOR) 10 MG tablet 90 tablet 3     Sig: Take 1 tablet (10 mg) by mouth    Statins Protocol Passed - 1/25/2019 12:49 PM       Passed - LDL on file in past 12 months    Recent Labs   Lab Test 08/31/18  1225   LDL 82            Passed - No abnormal creatine kinase in past 12 months    No lab results found.            Passed - Recent (12 mo) or future (30 days) visit within the authorizing provider's specialty    Patient had office visit in the last 12 months or has a visit in the next 30 days with authorizing provider or within the authorizing provider's specialty.  See \"Patient Info\" tab in inbasket, or \"Choose Columns\" in Meds & Orders section of the refill encounter.             Passed - Medication is active on med list       Passed - Patient is age 18 or older      simvastatin (ZOCOR) 10 MG tablet  Last Written Prescription Date:  03/19/2018  Last Fill Quantity: 90 tablet,  # refills: 3   Last office visit: 12/5/2018 with prescribing provider:  TRACE Saab   Future Office Visit:      Rosalind Miller RT (R) (M)      "

## 2019-02-20 ENCOUNTER — TELEPHONE (OUTPATIENT)
Dept: SLEEP MEDICINE | Facility: CLINIC | Age: 49
End: 2019-02-20

## 2019-02-20 NOTE — TELEPHONE ENCOUNTER
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 Michael Carreon D.C.  [facility]  [address] [place], [place] [id number]   Phone/Fax [phone]    Is the last office visit within 12 months? NO DATE OF LAST VISIT: 10/11/2017  Is Osmar compliant >4 hours? NO 60% IN THE LAST 30 DAYS  Is treatment effective, Is sleep apnea condition controlled? YES AHI is 4.0 FOR THE USAGE IN THE LAST 30 DAYS    ASSESSMENT/PLAN:  Our office is unable to sign the requested form at this time. Contacted Osmar to schedule follow up appointment. Appointment scheduled.

## 2019-03-12 ENCOUNTER — OFFICE VISIT (OUTPATIENT)
Dept: SLEEP MEDICINE | Facility: CLINIC | Age: 49
End: 2019-03-12
Payer: COMMERCIAL

## 2019-03-12 VITALS
DIASTOLIC BLOOD PRESSURE: 77 MMHG | SYSTOLIC BLOOD PRESSURE: 115 MMHG | OXYGEN SATURATION: 98 % | HEIGHT: 73 IN | BODY MASS INDEX: 33.8 KG/M2 | WEIGHT: 255 LBS | HEART RATE: 91 BPM

## 2019-03-12 DIAGNOSIS — G47.33 OSA (OBSTRUCTIVE SLEEP APNEA): Primary | ICD-10-CM

## 2019-03-12 PROCEDURE — 99214 OFFICE O/P EST MOD 30 MIN: CPT | Performed by: PHYSICIAN ASSISTANT

## 2019-03-12 ASSESSMENT — MIFFLIN-ST. JEOR: SCORE: 2075.42

## 2019-03-12 NOTE — PROGRESS NOTES
Obstructive Sleep Apnea - PAP Follow-Up Visit:    Chief Complaint   Patient presents with     CPAP Follow Up     Yearly follow up for supplies and DOT.        Osmar Angel comes in today for follow-up of their severe sleep apnea, managed with CPAP.     Osmar Angel presented in 2015 for evaluation of snoring (10 years), witnessed apnea (7 years) and daytime fatigue (2-3 years). He had a sleep study on 8/20/2015 at the [facility] [place] for snoring, observed apnea, excessive daytime sleepiness (ESS 12), morning dry mouth, morning headaches, large neck circumference (50 cm) and co-morbid HTN, and DMII. Sleep study demonstrated severe obstructive sleep apnea with AHI of 71, lowest oxygen saturation was 72%. RDI 71. Periodic Limb Movement Index 0.0/hour.    August 26, 2015.  Patient received a [employer] DreamStation Auto CPAP Auto. Pressures were set at Auto 11 - 15 cm H2O.    Overall, the patient rates his experience with PAP as 7-8 (0 poor, 10 great). The mask is comfortable. The mask is not leaking.  He is not snoring with the mask on. He is not having gasp arousals. He is not having any oral or nasal dryness. The pressure settings are comfortable.    His PAP interface is Full Face Mask.    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 Usually it takes about 5 minutes to fall asleep with the mask on. Wake time is typically 4 AM.  Patient is using PAP therapy 5-6 hours per night. The patient is usually getting 6-7 hours of sleep per night.    He does feel rested in the morning.    Dahlgren Sleepiness Scale: 0/24    [employer]ics  Auto-PAP 12 - 18 cmH2O 30 day usage data:    70% of days with > 4 hours of use. 1/30 days with no use.   Average use 5 hours and 13 minutes per day.   Average % of night in large leak 14 seconds.    CPAP 90% pressure 14.7 cm.   AHI 5.2 events per hour.    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     Past medical/surgical history, family  "history, social history, medications and allergies were reviewed.      Problem List:  Patient Active Problem List    Diagnosis Date Noted     VERONA (obstructive sleep apnea)-AHI 71 08/26/2015     Priority: Medium     Severe VERONA: 8/20/2015 (AHI 71, RDI 71, rebel 72%)       Obesity, Class II, BMI 35-39.9, with comorbidity 08/12/2015     Priority: Medium     Type 2 diabetes mellitus without complications (H) 06/04/2012     Priority: Medium     Diagnosed 2011       Hyperlipidemia LDL goal <100 06/04/2012     Priority: Medium     Hypertension goal BP (blood pressure) < 140/90 01/15/2010     Priority: Medium     External hemorrhoids 10/27/2008     Priority: Medium     Pain in joint, upper arm 06/04/2007     Priority: Medium     Profound impairment, one eye, impairment level not further specified 06/07/2005     Priority: Medium     left eye loss of direct vision          /77   Pulse 91   Ht 1.854 m (6' 0.99\")   Wt 115.7 kg (255 lb)   SpO2 98%   BMI 33.65 kg/m      Impression/Plan:  Severe Sleep apnea.   Tolerating PAP well. Daytime symptoms are improved.   Continue current treatment.   DOT forms signed.   Comprehensive DME.    Osmar Angel will follow up in about 1 year or sooner if questions/concerns.    Twenty-five minutes spent with patient, all of which were spent face-to-face counseling, consulting, coordinating plan of care.      Jt Heck PA-C      "

## 2019-03-12 NOTE — PATIENT INSTRUCTIONS
Your BMI is Body mass index is 33.65 kg/m .  Weight management is a personal decision.  If you are interested in exploring weight loss strategies, the following discussion covers the approaches that may be successful. Body mass index (BMI) is one way to tell whether you are at a healthy weight, overweight, or obese. It measures your weight in relation to your height.  A BMI of 18.5 to 24.9 is in the healthy range. A person with a BMI of 25 to 29.9 is considered overweight, and someone with a BMI of 30 or greater is considered obese. More than two-thirds of American adults are considered overweight or obese.  Being overweight or obese increases the risk for further weight gain. Excess weight may lead to heart disease and diabetes.  Creating and following plans for healthy eating and physical activity may help you improve your health.  Weight control is part of healthy lifestyle and includes exercise, emotional health, and healthy eating habits. Careful eating habits lifelong are the mainstay of weight control. Though there are significant health benefits from weight loss, long-term weight loss with diet alone may be very difficult to achieve- studies show long-term success with dietary management in less than 10% of people. Attaining a healthy weight may be especially difficult to achieve in those with severe obesity. In some cases, medications, devices and surgical management might be considered.  What can you do?  If you are overweight or obese and are interested in methods for weight loss, you should discuss this with your provider.     Consider reducing daily calorie intake by 500 calories.     Keep a food journal.     Avoiding skipping meals, consider cutting portions instead.    Diet combined with exercise helps maintain muscle while optimizing fat loss. Strength training is particularly important for building and maintaining muscle mass. Exercise helps reduce stress, increase energy, and improves fitness.  Increasing exercise without diet control, however, may not burn enough calories to loose weight.       Start walking three days a week 10-20 minutes at a time    Work towards walking thirty minutes five days a week     Eventually, increase the speed of your walking for 1-2 minutes at time    In addition, we recommend that you review healthy lifestyles and methods for weight loss available through the National Institutes of Health patient information sites:  http://win.niddk.nih.gov/publications/index.htm    And look into health and wellness programs that may be available through your health insurance provider, employer, local community center, or deepika club.    Weight management plan: Patient was referred to their PCP to discuss a diet and exercise plan.

## 2019-03-12 NOTE — NURSING NOTE
"Chief Complaint   Patient presents with     CPAP Follow Up     Yearly follow up for supplies and DOT.        Initial /77   Pulse 91   Ht 1.854 m (6' 0.99\")   Wt 115.7 kg (255 lb)   SpO2 98%   BMI 33.65 kg/m   Estimated body mass index is 33.65 kg/m  as calculated from the following:    Height as of this encounter: 1.854 m (6' 0.99\").    Weight as of this encounter: 115.7 kg (255 lb).    Medication Reconciliation: complete    DME: fhm  "

## 2019-03-25 NOTE — PROGRESS NOTES
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  Additional history: as documented    Patient Active Problem List   Diagnosis     Profound impairment, one eye, impairment level not further specified     Pain in joint, upper arm     External hemorrhoids     Hypertension goal BP (blood pressure) < 140/90     Type 2 diabetes mellitus without complications (H)     Hyperlipidemia LDL goal <100     Obesity, Class II, BMI 35-39.9, with comorbidity     VERONA (obstructive sleep apnea)-AHI 71     Past Surgical History:   Procedure Laterality Date     C REPAIR ING HERNIA, INFANT,REDUC      bilateral     ROTATOR CUFF REPAIR RT/LT      right arthroscopic     SURGICAL HISTORY OF -       right knee arthroscopy     SURGICAL HISTORY OF -       umbilical hernia repair     VASECTOMY         Social History     Tobacco Use     Smoking status: Former Smoker     Last attempt to quit: 2005     Years since quittin.1     Smokeless tobacco: Never Used   Substance Use Topics     Alcohol use: Yes     Alcohol/week: 0.0 oz     Comment: rarely     Family History   Problem Relation Age of Onset     Diabetes Maternal Grandfather      C.A.D. Paternal Grandmother      C.A.D. Paternal Grandfather      Muscular Dystrophy Paternal Grandfather      Cardiovascular Father         a. fib     Cerebrovascular Disease Father      Heart Disease Father   "       pacemaker     Asthma No family hx of      Hypertension No family hx of      Breast Cancer No family hx of      Cancer - colorectal No family hx of      Prostate Cancer No family hx of          Current Outpatient Medications   Medication Sig Dispense Refill     aspirin 81 MG tablet Take 1 tablet by mouth daily.       blood glucose monitoring (ACCU-CHEK FASTCLIX) lancets 1 each daily Use to test blood sugar 1 times daily or as directed. 102 each 11     blood glucose monitoring (ACCU-CHEK SMARTVIEW) test strip 1 strip by In Vitro route 3 times daily 100 each 11     CINNAMON PO        lisinopril (PRINIVIL/ZESTRIL) 10 MG tablet Take 1 tablet (10 mg) by mouth daily 90 tablet 1     metFORMIN (GLUCOPHAGE) 1000 MG tablet Take 1 tablet (1,000 mg) by mouth 2 times daily (with meals) 60 tablet 5     Omega-3 Fatty Acids (OMEGA-3 FISH OIL PO)        order for DME Equipment being ordered: CPAP Osmar Angel  received a [employer] DreamStation Auto CPAP Auto. Pressures were set at Auto 11 - 15 cm H2O.       simvastatin (ZOCOR) 10 MG tablet TAKE ONE TABLET BY MOUTH AT BEDTIME 90 tablet 1     VITAMIN D, CHOLECALCIFEROL, PO Take by mouth daily       Allergies   Allergen Reactions     Wellbutrin [Bupropion Hcl] Swelling     Labs reviewed in EPIC    Reviewed and updated as needed this visit by clinical staff       Reviewed and updated as needed this visit by Provider         ROS:  Constitutional, HEENT, cardiovascular, pulmonary, gi and gu systems are negative, except as otherwise noted.    OBJECTIVE:     /62 (Cuff Size: Adult Large)   Pulse 104   Temp 98.6  F (37  C) (Tympanic)   Resp 18   Ht 1.854 m (6' 1\")   Wt 118.8 kg (262 lb)   BMI 34.57 kg/m    Body mass index is 34.57 kg/m .  GENERAL: healthy, alert and no distress  MS: tenderness to palpation left lateral wrist, positive Finkelstein's left, negative Phalen's bilateral  NEURO: Normal strength and tone, mentation intact and speech normal  PSYCH: " mentation appears normal, affect normal/bright    Diagnostic Test Results:  none     ASSESSMENT/PLAN:     1. De Quervain's disease (tenosynovitis)  Symptoms consistent with De Quervain's.  Recommended thumb spica splint to help decrease inflammation.  Ortho referral placed as Price is not interested in trying additional rest, needs to get better in time for busy season. Symptomatic care and follow up discussed  - ORTHO  REFERRAL    2. Encounter for immunization    - ADMIN 1st VACCINE    Home care instructions were reviewed with the patient. The risks, benefits and treatment options of prescribed medications or other treatments have been discussed with the patient. The patient verbalized their understanding and should call or follow up if no improvement or if they develop further problems.    Patient Instructions     Patient Education     De Quervain Tenosynovitis    De Quervain tenosynovitis is inflammation of tendons and synovium on the thumb side of the wrist. Tendons are fibers that attach muscle to bone. Synovium is a slick membrane that helps tendons move. Movements done over and over can irritate and inflame these tissues. This can cause pain when you touch or grasp objects, turn or twist your wrist, or make a fist. You may also have pain and swelling near the base of the thumb or numbness along the back of your thumb and index finger. You may also feel the thumb catch or snap when you move it.  Treatment will depend on how bad the symptoms are. It can often be treated with medicines, injections, splinting, and home care. If your case is severe, you may be referred to a specialist to talk about surgery.  Home care  Your healthcare provider may prescribe medicines to relieve pain and reduce inflammation. A steroid medicine may be injected near the tendons. This reduces swelling and pain. The healthcare provider may also suggest taking over-the-counter medicines such as ibuprofen or naproxen. These help  reduce inflammation. Take all medicines only as directed.  The following are general care guidelines:    Avoid repetitive movements of your wrist and thumb.    Note any activity that causes pain or swelling. If possible, avoid or limit that activity.    Put a cold pack on your thumb. To make an ice pack, put ice cubes in a plastic bag that seals at the top. Wrap the bag in a clean, thin towel or cloth. Hold this to your thumb for up to 20 minutes at a time. Don't put ice directly on the skin.    Your healthcare provider may put a splint on the thumb to hold it still. Use the splint as you have been instructed. In some cases, you may need to use a splint 24 hours a day for 4 to 6 weeks. This will allow the wrist and thumb to heal.  Follow-up care  Follow up with your healthcare provider, or as advised. You may need more treatment if your injury is severe or if your symptoms don't get better. This additional treatment may include local injections, physical therapy, and surgery.  When to seek medical advice  Call your healthcare provider right away if any of these occur:    Increase in pain or swelling    You have fever, chills, redness, warmth, or drainage    Symptoms get worse after taking medicine    Pain spreads farther down the thumb or into the forearm    Pain continues to get in the way of daily life  Date Last Reviewed: 6/1/2018 2000-2018 [employer]. [address] [place] [id number]. All rights reserved. This information is not intended as a substitute for professional medical care. Always follow your healthcare professional's instructions.               NIURKA Escobar [facility]

## 2019-03-26 ENCOUNTER — OFFICE VISIT (OUTPATIENT)
Dept: FAMILY MEDICINE | Facility: CLINIC | Age: 49
End: 2019-03-26
Payer: COMMERCIAL

## 2019-03-26 VITALS
DIASTOLIC BLOOD PRESSURE: 62 MMHG | HEART RATE: 104 BPM | TEMPERATURE: 98.6 F | WEIGHT: 262 LBS | HEIGHT: 73 IN | SYSTOLIC BLOOD PRESSURE: 110 MMHG | BODY MASS INDEX: 34.72 KG/M2 | RESPIRATION RATE: 18 BRPM

## 2019-03-26 DIAGNOSIS — Z23 ENCOUNTER FOR IMMUNIZATION: ICD-10-CM

## 2019-03-26 DIAGNOSIS — M65.4 DE QUERVAIN'S DISEASE (TENOSYNOVITIS): Primary | ICD-10-CM

## 2019-03-26 PROCEDURE — 90471 IMMUNIZATION ADMIN: CPT | Performed by: NURSE PRACTITIONER

## 2019-03-26 PROCEDURE — 90715 TDAP VACCINE 7 YRS/> IM: CPT | Performed by: NURSE PRACTITIONER

## 2019-03-26 PROCEDURE — 99213 OFFICE O/P EST LOW 20 MIN: CPT | Mod: 25 | Performed by: NURSE PRACTITIONER

## 2019-03-26 ASSESSMENT — MIFFLIN-ST. JEOR: SCORE: 2107.3

## 2019-03-26 NOTE — PATIENT INSTRUCTIONS
Patient Education     De Quervain Tenosynovitis    De Quervain tenosynovitis is inflammation of tendons and synovium on the thumb side of the wrist. Tendons are fibers that attach muscle to bone. Synovium is a slick membrane that helps tendons move. Movements done over and over can irritate and inflame these tissues. This can cause pain when you touch or grasp objects, turn or twist your wrist, or make a fist. You may also have pain and swelling near the base of the thumb or numbness along the back of your thumb and index finger. You may also feel the thumb catch or snap when you move it.  Treatment will depend on how bad the symptoms are. It can often be treated with medicines, injections, splinting, and home care. If your case is severe, you may be referred to a specialist to talk about surgery.  Home care  Your healthcare provider may prescribe medicines to relieve pain and reduce inflammation. A steroid medicine may be injected near the tendons. This reduces swelling and pain. The healthcare provider may also suggest taking over-the-counter medicines such as ibuprofen or naproxen. These help reduce inflammation. Take all medicines only as directed.  The following are general care guidelines:    Avoid repetitive movements of your wrist and thumb.    Note any activity that causes pain or swelling. If possible, avoid or limit that activity.    Put a cold pack on your thumb. To make an ice pack, put ice cubes in a plastic bag that seals at the top. Wrap the bag in a clean, thin towel or cloth. Hold this to your thumb for up to 20 minutes at a time. Don't put ice directly on the skin.    Your healthcare provider may put a splint on the thumb to hold it still. Use the splint as you have been instructed. In some cases, you may need to use a splint 24 hours a day for 4 to 6 weeks. This will allow the wrist and thumb to heal.  Follow-up care  Follow up with your healthcare provider, or as advised. You may need  more treatment if your injury is severe or if your symptoms don't get better. This additional treatment may include local injections, physical therapy, and surgery.  When to seek medical advice  Call your healthcare provider right away if any of these occur:    Increase in pain or swelling    You have fever, chills, redness, warmth, or drainage    Symptoms get worse after taking medicine    Pain spreads farther down the thumb or into the forearm    Pain continues to get in the way of daily life  Date Last Reviewed: 6/1/2018 2000-2018 [employer]. [address] [place], [place] [id number]. All rights reserved. This information is not intended as a substitute for professional medical care. Always follow your healthcare professional's instructions.

## 2019-04-01 ENCOUNTER — OFFICE VISIT (OUTPATIENT)
Dept: ORTHOPEDICS | Facility: CLINIC | Age: 49
End: 2019-04-01
Payer: COMMERCIAL

## 2019-04-01 ENCOUNTER — ANCILLARY PROCEDURE (OUTPATIENT)
Dept: GENERAL RADIOLOGY | Facility: CLINIC | Age: 49
End: 2019-04-01
Attending: FAMILY MEDICINE
Payer: COMMERCIAL

## 2019-04-01 VITALS — SYSTOLIC BLOOD PRESSURE: 126 MMHG | DIASTOLIC BLOOD PRESSURE: 88 MMHG

## 2019-04-01 DIAGNOSIS — M25.532 LEFT WRIST PAIN: ICD-10-CM

## 2019-04-01 DIAGNOSIS — M25.532 LEFT WRIST PAIN: Primary | ICD-10-CM

## 2019-04-01 PROCEDURE — 73110 X-RAY EXAM OF WRIST: CPT | Mod: LT

## 2019-04-01 PROCEDURE — 20604 DRAIN/INJ JOINT/BURSA W/US: CPT | Mod: 50 | Performed by: FAMILY MEDICINE

## 2019-04-01 PROCEDURE — 99203 OFFICE O/P NEW LOW 30 MIN: CPT | Mod: 25 | Performed by: FAMILY MEDICINE

## 2019-04-01 RX ADMIN — BETAMETHASONE SODIUM PHOSPHATE AND BETAMETHASONE ACETATE 6 MG: 3; 3 INJECTION, SUSPENSION INTRA-ARTICULAR; INTRALESIONAL; INTRAMUSCULAR; SOFT TISSUE at 15:45

## 2019-04-01 RX ADMIN — ROPIVACAINE HYDROCHLORIDE 1 ML: 5 INJECTION, SOLUTION EPIDURAL; INFILTRATION; PERINEURAL at 15:45

## 2019-04-01 ASSESSMENT — MIFFLIN-ST. JEOR: SCORE: 2107.3

## 2019-04-01 NOTE — LETTER
4/1/2019         RE: Osmar Angel  [address] [facility] [id number]        Dear Colleague,    Thank you for referring your patient, Osmar Angel, to the [facility]. Please see a copy of my visit note below.    ASSESSMENT & PLAN  Osmar was seen today for pain.    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  No triggering noted.  Pt was given tx options including rest, bracing and injections to which pt elected all of them.  Pt reported significant improvement afterwards. Aftercares given f/u PRN.  Treatment: steroid injection, OTC bracing  Physical Therapy none  Injection completed today  Medications Limited NSAIDs/Tylenol    Concerning signs/sx that would warrant urgent evaluation were discussed.  All questions were answered, patient understands and agrees with plan.      Return if symptoms worsen or fail to improve.    -----    SUBJECTIVE  Osmar Angel is a/an 48 year old Right handed male who is seen in consultation at the request of  Cinthya Johnson C.N.P. for evaluation of left hand pain. The patient is seen by themselves.    Onset: 1 years(s) ago. Patient describes injury as overuse.  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    Location of Pain: left wrist   Rating of Pain at worst: 7/10  Rating of Pain Currently: 2/10  Worsened by: impact  Better with: rest  Treatments tried: ice, heat and massage   Associated symptoms: no distal numbness or tingling;  denies swelling or warmth  Orthopedic history: YES - 15 years ago- trigger thumb   Relevant surgical history: NO  Past Medical History:   Diagnosis Date     Amblyopia, unspecified      Myopia      Profound impairment, one eye, impairment level not further specified     left eye loss of direct vision     Sprain of ankle, unspecified site      Social History     Socioeconomic History     Marital status:      Spouse name: Not on file     Number of children: Not on file     Years of education: Not on file     Highest education level: Not on file   Occupational History     Not on file   Social Needs     Financial resource strain: Not on file     Food insecurity:     Worry: Not on file     Inability: Not on file     Transportation needs:     Medical: Not on file     Non-medical: Not on file   Tobacco Use     Smoking status: Former Smoker     Last attempt to quit: 2005     Years since quittin.1     Smokeless tobacco: Never Used   Substance and Sexual Activity     Alcohol use: Yes     Alcohol/week: 0.0 oz     Comment: rarely     Drug use: No     Sexual activity: Yes     Partners: Female     Birth control/protection: Surgical   Lifestyle     Physical activity:     Days per week: Not on file     Minutes per session: Not on file     Stress: Not on file   Relationships     Social connections:     Talks on phone: Not on file     Gets together: Not on file     Attends [employer] service: Not on file     Active member of club or organization: Not on file     Attends meetings of clubs or organizations: Not on file     Relationship status: Not on file     Intimate partner violence:     Fear of current or ex partner: Not on file     Emotionally abused: Not on file     Physically abused: Not on file     Forced sexual activity: Not on file   Other Topics Concern      Service No     Blood Transfusions No     Caffeine Concern No     Occupational Exposure No     Hobby Hazards No     Sleep Concern Yes     Stress  "Concern No     Weight Concern No     Special Diet No     Back Care No     Exercise Yes     Bike Helmet Not Asked     Seat Belt Yes     Self-Exams Yes     Parent/sibling w/ CABG, MI or angioplasty before 65F 55M? No   Social History Narrative     Not on file         Patient's past medical, surgical, social, and family histories were reviewed today and no changes are noted.    REVIEW OF SYSTEMS:  10 point ROS is negative other than symptoms noted above in HPI, Past Medical History or as stated below  Constitutional: NEGATIVE for fever, chills, change in weight  Skin: NEGATIVE for worrisome rashes, moles or lesions  GI/: NEGATIVE for bowel or bladder changes  Neuro: NEGATIVE for weakness, dizziness or paresthesias    OBJECTIVE:  /88   Ht (P) 1.854 m (6' 1\")   Wt (P) 118.8 kg (262 lb)   BMI (P) 34.57 kg/m      General: healthy, alert and in no distress  HEENT: no scleral icterus or conjunctival erythema  Skin: no suspicious lesions or rash. No jaundice.  CV: regular rhythm by palpation  Resp: normal respiratory effort without conversational dyspnea   Psych: normal mood and affect  Gait: normal steady gait with appropriate coordination and balance  Neuro: normal light touch sensory exam of the bilateral hands.    MSK:  LEFT HAND  Inspection:    No swelling or obvious deformity or asymmetry  Palpation:   Carpals: normal   Metacarpals: normal   Thumb: CMC   Fingers: normal  Range of Motion:    Full active flexion and extension at MCP, PIP, and DIP joints; normal finger cascade without malrotation.  Wrist pronation, supination, and ulnar/radial deviation normal.  Strength: Mild pain on thumb with strength testing otherwise full     full, extension full, flexion full, abduction full, adduction full, opposition full  Special Tests:    Positive: CMC grind    Negative: palpable triggering, thenar eminence wasting, hypothenar eminence wasting, Tinel's, Phalen's, Finkelstein's, flexor digitorum superficialis " testing, flexor digitorum profundus testing    Independent visualization of the below image:  No results found for this or any previous visit (from the past 24 hour(s)).  XR WRIST LEFT G/E 3 VIEWS 4/1/2019 3:12 PM     HISTORY: Left wrist pain.     COMPARISON: None.     FINDINGS: No fracture or malalignment. Mild osseous degenerative  change at the basilar joint of the thumb. Osseous structures otherwise  appear normal.                                                                      IMPRESSION: Mild osseous degenerative change at the basilar joint of  the thumb.     LANRE HARDIN MD  Small Joint Injection/Arthrocentesis: L thumb CMC  Date/Time: 4/1/2019 3:45 PM  Performed by: Sarbjit King MD  Authorized by: Sarbjit King MD     Indications:  Pain  Needle Size:  27 G  Guidance: ultrasound    Approach:  Radial  Location:  Thumb  Site:  L thumb CMC  Medications:  6 mg betamethasone acet & sod phos 6 (3-3) MG/ML; 1 mL ropivacaine 5 MG/ML  Procedure discussed: discussed risks, benefits, and alternatives    Consent Given by:  Patient  Timeout: timeout called immediately prior to procedure    Prep: patient was prepped and draped in usual sterile fashion     Patient reported significant improvement of pain after injection.  Aftercare instructions given to patient.  Plan to follow-up as discussed above.     MD [facility]            Patient's conditions were thoroughly discussed during today's visit with greater than 50% of the visit spent counseling the patient with total time spent face-to-face with the patient being 30 minutes.    MD [facility]        Again, thank you for allowing me to participate in the care of your patient.        Sincerely,        Sarbjit King MD

## 2019-04-01 NOTE — PROGRESS NOTES
ASSESSMENT & PLAN  Osmar was seen today for pain.    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  No triggering noted.  Pt was given tx options including rest, bracing and injections to which pt elected all of them.  Pt reported significant improvement afterwards. Aftercares given f/u PRN.  Treatment: steroid injection, OTC bracing  Physical Therapy none  Injection completed today  Medications Limited NSAIDs/Tylenol    Concerning signs/sx that would warrant urgent evaluation were discussed.  All questions were answered, patient understands and agrees with plan.      Return if symptoms worsen or fail to improve.    -----    SUBJECTIVE  Osmar Angel is a/an 48 year old Right handed male who is seen in consultation at the request of  Cinthya Johnson C.N.P. for evaluation of left hand pain. The patient is seen by themselves.    Onset: 1 years(s) ago. Patient describes injury as overuse.  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    Location of Pain: left wrist   Rating of Pain at worst: 7/10  Rating of Pain Currently: 2/10  Worsened by: impact  Better with: rest  Treatments tried: ice, heat and massage   Associated symptoms: no distal numbness or tingling; denies swelling or warmth  Orthopedic history: YES - 15 years ago- trigger thumb   Relevant surgical history: NO  Past Medical History:   Diagnosis Date     Amblyopia, unspecified      Myopia      Profound impairment, one eye, impairment level not further  specified     left eye loss of direct vision     Sprain of ankle, unspecified site      Social History     Socioeconomic History     Marital status:      Spouse name: Not on file     Number of children: Not on file     Years of education: Not on file     Highest education level: Not on file   Occupational History     Not on file   Social Needs     Financial resource strain: Not on file     Food insecurity:     Worry: Not on file     Inability: Not on file     Transportation needs:     Medical: Not on file     Non-medical: Not on file   Tobacco Use     Smoking status: Former Smoker     Last attempt to quit: 2005     Years since quittin.1     Smokeless tobacco: Never Used   Substance and Sexual Activity     Alcohol use: Yes     Alcohol/week: 0.0 oz     Comment: rarely     Drug use: No     Sexual activity: Yes     Partners: Female     Birth control/protection: Surgical   Lifestyle     Physical activity:     Days per week: Not on file     Minutes per session: Not on file     Stress: Not on file   Relationships     Social connections:     Talks on phone: Not on file     Gets together: Not on file     Attends Jew service: Not on file     Active member of club or organization: Not on file     Attends meetings of clubs or organizations: Not on file     Relationship status: Not on file     Intimate partner violence:     Fear of current or ex partner: Not on file     Emotionally abused: Not on file     Physically abused: Not on file     Forced sexual activity: Not on file   Other Topics Concern      Service No     Blood Transfusions No     Caffeine Concern No     Occupational Exposure No     Hobby Hazards No     Sleep Concern Yes     Stress Concern No     Weight Concern No     Special Diet No     Back Care No     Exercise Yes     Bike Helmet Not Asked     Seat Belt Yes     Self-Exams Yes     Parent/sibling w/ CABG, MI or angioplasty before 65F 55M? No   Social History Narrative     Not on file  "        Patient's past medical, surgical, social, and family histories were reviewed today and no changes are noted.    REVIEW OF SYSTEMS:  10 point ROS is negative other than symptoms noted above in HPI, Past Medical History or as stated below  Constitutional: NEGATIVE for fever, chills, change in weight  Skin: NEGATIVE for worrisome rashes, moles or lesions  GI/: NEGATIVE for bowel or bladder changes  Neuro: NEGATIVE for weakness, dizziness or paresthesias    OBJECTIVE:  /88   Ht (P) 1.854 m (6' 1\")   Wt (P) 118.8 kg (262 lb)   BMI (P) 34.57 kg/m     General: healthy, alert and in no distress  HEENT: no scleral icterus or conjunctival erythema  Skin: no suspicious lesions or rash. No jaundice.  CV: regular rhythm by palpation  Resp: normal respiratory effort without conversational dyspnea   Psych: normal mood and affect  Gait: normal steady gait with appropriate coordination and balance  Neuro: normal light touch sensory exam of the bilateral hands.    MSK:  LEFT HAND  Inspection:    No swelling or obvious deformity or asymmetry  Palpation:   Carpals: normal   Metacarpals: normal   Thumb: CMC   Fingers: normal  Range of Motion:    Full active flexion and extension at MCP, PIP, and DIP joints; normal finger cascade without malrotation.  Wrist pronation, supination, and ulnar/radial deviation normal.  Strength: Mild pain on thumb with strength testing otherwise full     full, extension full, flexion full, abduction full, adduction full, opposition full  Special Tests:    Positive: CMC grind    Negative: palpable triggering, thenar eminence wasting, hypothenar eminence wasting, Tinel's, Phalen's, Finkelstein's, flexor digitorum superficialis testing, flexor digitorum profundus testing    Independent visualization of the below image:  No results found for this or any previous visit (from the past 24 hour(s)).  XR WRIST LEFT G/E 3 VIEWS 4/1/2019 3:12 PM     HISTORY: Left wrist pain.     COMPARISON: " None.     FINDINGS: No fracture or malalignment. Mild osseous degenerative  change at the basilar joint of the thumb. Osseous structures otherwise  appear normal.                                                                      IMPRESSION: Mild osseous degenerative change at the basilar joint of  the thumb.     LANRE HARDIN MD  Small Joint Injection/Arthrocentesis: L thumb CMC  Date/Time: 4/1/2019 3:45 PM  Performed by: Sarbjit King MD  Authorized by: Sarbjit King MD     Indications:  Pain  Needle Size:  27 G  Guidance: ultrasound    Approach:  Radial  Location:  Thumb  Site:  L thumb CMC  Medications:  6 mg betamethasone acet & sod phos 6 (3-3) MG/ML; 1 mL ropivacaine 5 MG/ML  Procedure discussed: discussed risks, benefits, and alternatives    Consent Given by:  Patient  Timeout: timeout called immediately prior to procedure    Prep: patient was prepped and draped in usual sterile fashion     Patient reported significant improvement of pain after injection.  Aftercare instructions given to patient.  Plan to follow-up as discussed above.     MD [facility]            Patient's conditions were thoroughly discussed during today's visit with greater than 50% of the visit spent counseling the patient with total time spent face-to-face with the patient being 30 minutes.    MD BR[facility]

## 2019-04-01 NOTE — PATIENT INSTRUCTIONS
Ariana Arthritis Thumb Splint - Thumb Spica Support Brace for Pain, Sprains, Strains, Arthritis, Carpal Tunnel & Trigger Thumb Immobilizer - Wrist    FS Injection Discharge Instructions      You may shower, however avoid swimming, tub baths or hot tubs for 24 hours following your procedure    You may have a mild to moderate increase in pain for several days following the injection.    It may take up to 14 days for the steroid medication to start working although you may feel the effect as early as a few days after the procedure.    You may use ice packs for 10-15 minutes, 3 to 4 times a day at the injection site for comfort    You may use anti-inflammatory medications (such as Ibuprofen or Aleve or Advil) or Tylenol for pain control if necessary    If you were fasting, you may resume your normal diet and medications after the procedure    If you have diabetes, check your blood sugar more frequently than usual as your blood sugar may be higher than normal for 10-14 days following a steroid injection. Contact your doctor who manages your diabetes if your blood sugar is higher than usual      If you experience any of the following, call [facility] [place] @ [phone] or [phone]  -Fever over 100 degree F  -Swelling, bleeding, redness, drainage, warmth at the injection site  - New or worsening pain

## 2019-04-05 RX ORDER — BETAMETHASONE SODIUM PHOSPHATE AND BETAMETHASONE ACETATE 3; 3 MG/ML; MG/ML
6 INJECTION, SUSPENSION INTRA-ARTICULAR; INTRALESIONAL; INTRAMUSCULAR; SOFT TISSUE
Status: SHIPPED | OUTPATIENT
Start: 2019-04-01

## 2019-04-05 RX ORDER — ROPIVACAINE HYDROCHLORIDE 5 MG/ML
1 INJECTION, SOLUTION EPIDURAL; INFILTRATION; PERINEURAL
Status: SHIPPED | OUTPATIENT
Start: 2019-04-01

## 2019-04-09 ENCOUNTER — OFFICE VISIT (OUTPATIENT)
Dept: FAMILY MEDICINE | Facility: CLINIC | Age: 49
End: 2019-04-09
Payer: COMMERCIAL

## 2019-04-09 VITALS
WEIGHT: 255 LBS | RESPIRATION RATE: 18 BRPM | TEMPERATURE: 98.4 F | HEIGHT: 72 IN | DIASTOLIC BLOOD PRESSURE: 72 MMHG | HEART RATE: 86 BPM | BODY MASS INDEX: 34.54 KG/M2 | SYSTOLIC BLOOD PRESSURE: 118 MMHG

## 2019-04-09 DIAGNOSIS — I10 HYPERTENSION GOAL BP (BLOOD PRESSURE) < 140/90: Chronic | ICD-10-CM

## 2019-04-09 DIAGNOSIS — N52.9 IMPOTENCE OF ORGANIC ORIGIN: ICD-10-CM

## 2019-04-09 DIAGNOSIS — E78.5 HYPERLIPIDEMIA LDL GOAL <100: ICD-10-CM

## 2019-04-09 DIAGNOSIS — Z00.00 ENCOUNTER FOR ROUTINE ADULT HEALTH EXAMINATION WITHOUT ABNORMAL FINDINGS: Primary | ICD-10-CM

## 2019-04-09 DIAGNOSIS — Z12.11 SPECIAL SCREENING FOR MALIGNANT NEOPLASMS, COLON: ICD-10-CM

## 2019-04-09 DIAGNOSIS — E11.9 TYPE 2 DIABETES MELLITUS WITHOUT COMPLICATION, WITHOUT LONG-TERM CURRENT USE OF INSULIN (H): ICD-10-CM

## 2019-04-09 LAB
CREAT UR-MCNC: 102 MG/DL
HBA1C MFR BLD: 8.1 % (ref 0–5.6)
LDLC SERPL DIRECT ASSAY-MCNC: 88 MG/DL
MICROALBUMIN UR-MCNC: 29 MG/L
MICROALBUMIN/CREAT UR: 28.63 MG/G CR (ref 0–17)

## 2019-04-09 PROCEDURE — 83721 ASSAY OF BLOOD LIPOPROTEIN: CPT | Performed by: NURSE PRACTITIONER

## 2019-04-09 PROCEDURE — 83036 HEMOGLOBIN GLYCOSYLATED A1C: CPT | Performed by: NURSE PRACTITIONER

## 2019-04-09 PROCEDURE — 82043 UR ALBUMIN QUANTITATIVE: CPT | Performed by: NURSE PRACTITIONER

## 2019-04-09 PROCEDURE — 99213 OFFICE O/P EST LOW 20 MIN: CPT | Mod: 25 | Performed by: NURSE PRACTITIONER

## 2019-04-09 PROCEDURE — 36415 COLL VENOUS BLD VENIPUNCTURE: CPT | Performed by: NURSE PRACTITIONER

## 2019-04-09 PROCEDURE — 99207 C FOOT EXAM  NO CHARGE: CPT | Mod: 25 | Performed by: NURSE PRACTITIONER

## 2019-04-09 PROCEDURE — 99396 PREV VISIT EST AGE 40-64: CPT | Performed by: NURSE PRACTITIONER

## 2019-04-09 RX ORDER — SILDENAFIL CITRATE 20 MG/1
TABLET ORAL
Qty: 30 TABLET | Refills: 3 | Status: SHIPPED | OUTPATIENT
Start: 2019-04-09 | End: 2020-07-02

## 2019-04-09 RX ORDER — SIMVASTATIN 10 MG
TABLET ORAL
Qty: 90 TABLET | Refills: 1 | Status: SHIPPED | OUTPATIENT
Start: 2019-04-09 | End: 2020-02-14

## 2019-04-09 RX ORDER — LISINOPRIL 10 MG/1
10 TABLET ORAL DAILY
Qty: 90 TABLET | Refills: 1 | Status: SHIPPED | OUTPATIENT
Start: 2019-04-09 | End: 2020-01-03

## 2019-04-09 ASSESSMENT — ENCOUNTER SYMPTOMS
HEMATOCHEZIA: 0
CONSTIPATION: 0
PALPITATIONS: 0
EYE PAIN: 0
ABDOMINAL PAIN: 0
HEADACHES: 0
DYSURIA: 0
DIARRHEA: 0
WEAKNESS: 0
CHILLS: 0
FREQUENCY: 0
SHORTNESS OF BREATH: 0
HEMATURIA: 0
PARESTHESIAS: 0
JOINT SWELLING: 0
MYALGIAS: 0
ARTHRALGIAS: 0
FEVER: 0
HEARTBURN: 0
NAUSEA: 0
DIZZINESS: 0
SORE THROAT: 0
COUGH: 0
NERVOUS/ANXIOUS: 0

## 2019-04-09 ASSESSMENT — MIFFLIN-ST. JEOR: SCORE: 2059.67

## 2019-04-09 NOTE — PROGRESS NOTES
SUBJECTIVE:   CC: Osmar Angel is an 48 year old male who presents for preventative health visit.     Healthy Habits:     Getting at least 3 servings of Calcium per day:  Yes    Bi-annual eye exam:  Yes    Dental care twice a year:  NO    Sleep apnea or symptoms of sleep apnea:  Sleep apnea    Diet:  Diabetic and Carbohydrate counting    Frequency of exercise:  2-3 days/week    Duration of exercise:  15-30 minutes    Taking medications regularly:  Yes    Medication side effects:  None    PHQ-2 Total Score: 0    Additional concerns today:  No    Diabetes Follow-up    Patient is checking blood sugars: once daily.  Results are as follows:         120-190    Diabetic concerns: None     Symptoms of hypoglycemia (low blood sugar): none     Paresthesias (numbness or burning in feet) or sores: No     Date of last diabetic eye exam: 12/18    Diabetes Management Resources    Hyperlipidemia Follow-Up      Rate your low fat/cholesterol diet?: not monitoring fat    Taking statin?  Yes, no muscle aches from statin    Other lipid medications/supplements?:  none    Hypertension Follow-up      Outpatient blood pressures are not being checked.    Low Salt Diet: not monitoring salt    BP Readings from Last 2 Encounters:   04/09/19 118/72   04/01/19 126/88     Hemoglobin A1C (%)   Date Value   04/09/2019 8.1 (H)   12/05/2018 7.7 (H)     LDL Cholesterol Calculated (mg/dL)   Date Value   08/31/2018 82   02/26/2018 64     LDL Cholesterol Direct (mg/dL)   Date Value   04/09/2019 88       Today's PHQ-2 Score:   PHQ-2 ( 1999 [employer]) 4/9/2019   Q1: Little interest or pleasure in doing things 0   Q2: Feeling down, depressed or hopeless 0   PHQ-2 Score 0   Q1: Little interest or pleasure in doing things Not at all   Q2: Feeling down, depressed or hopeless Not at all   PHQ-2 Score 0       Abuse: Current or Past(Physical, Sexual or Emotional)- No  Do you feel safe in your environment? Yes    Social History     Tobacco Use     Smoking status:  Former Smoker     Last attempt to quit: 2005     Years since quittin.1     Smokeless tobacco: Never Used   Substance Use Topics     Alcohol use: Yes     Alcohol/week: 0.0 oz     Comment: rarely     If you drink alcohol do you typically have >3 drinks per day or >7 drinks per week? No    Alcohol Use 2019   Prescreen: >3 drinks/day or >7 drinks/week? No   Prescreen: >3 drinks/day or >7 drinks/week? -   No flowsheet data found.    Last PSA: No results found for: PSA    Reviewed orders with patient. Reviewed health maintenance and updated orders accordingly - Yes  Labs reviewed in EPIC    Reviewed and updated as needed this visit by clinical staff         Reviewed and updated as needed this visit by Provider          Review of Systems   Constitutional: Negative for chills and fever.   HENT: Negative for congestion, ear pain, hearing loss and sore throat.    Eyes: Negative for pain and visual disturbance.   Respiratory: Negative for cough and shortness of breath.    Cardiovascular: Negative for chest pain, palpitations and peripheral edema.   Gastrointestinal: Negative for abdominal pain, constipation, diarrhea, heartburn, hematochezia and nausea.   Genitourinary: Positive for impotence (difficulty with obtaining and maintaining an erection). Negative for discharge, dysuria, frequency, genital sores, hematuria and urgency.   Musculoskeletal: Negative for arthralgias, joint swelling and myalgias.   Skin: Negative for rash.   Neurological: Negative for dizziness, weakness, headaches and paresthesias.   Psychiatric/Behavioral: Negative for mood changes. The patient is not nervous/anxious.      OBJECTIVE:   /72 (BP Location: Right arm, Patient Position: Sitting, Cuff Size: Adult Large)   Pulse 86   Temp 98.4  F (36.9  C) (Tympanic)   Resp 18   Ht 1.829 m (6')   Wt 115.7 kg (255 lb)   BMI 34.58 kg/m      Physical Exam  GENERAL: healthy, alert and no distress  EYES: Eyes grossly normal to inspection,  PERRL and conjunctivae and sclerae normal  HENT: ear canals and TM's normal, nose and mouth without ulcers or lesions  NECK: no adenopathy, no asymmetry, masses, or scars and thyroid normal to palpation  RESP: lungs clear to auscultation - no rales, rhonchi or wheezes  CV: regular rate and rhythm, normal S1 S2, no S3 or S4, no murmur, click or rub, no peripheral edema and peripheral pulses strong  ABDOMEN: soft, nontender, no hepatosplenomegaly, no masses and bowel sounds normal  MS: no gross musculoskeletal defects noted, no edema  SKIN: no suspicious lesions or rashes  NEURO: Normal strength and tone, mentation intact and speech normal  PSYCH: mentation appears normal, affect normal/bright  Diabetic foot exam: normal DP and PT pulses, no trophic changes or ulcerative lesions and normal sensory exam    Diagnostic Test Results:  Results for orders placed or performed in visit on 04/09/19   LDL cholesterol direct   Result Value Ref Range    LDL Cholesterol Direct 88 <100 mg/dL   **A1C FUTURE 1yr   Result Value Ref Range    Hemoglobin A1C 8.1 (H) 0 - 5.6 %   Albumin Random Urine Quantitative with Creat Ratio   Result Value Ref Range    Creatinine Urine 102 mg/dL    Albumin Urine mg/L 29 mg/L    Albumin Urine mg/g Cr 28.63 (H) 0 - 17 mg/g Cr       ASSESSMENT/PLAN:   1. Encounter for routine adult health examination without abnormal findings      2. Type 2 diabetes mellitus without complication, without long-term current use of insulin (H)  Not controlled. Working on lifestyle changes.  If no improvement in 3 months, will need to consider additional medication.  - metFORMIN (GLUCOPHAGE) 1000 MG tablet; Take 1 tablet (1,000 mg) by mouth 2 times daily (with meals)  Dispense: 180 tablet; Refill: 1  - simvastatin (ZOCOR) 10 MG tablet; TAKE ONE TABLET BY MOUTH AT BEDTIME  Dispense: 90 tablet; Refill: 1  - FOOT EXAM  - **A1C FUTURE 1yr  - Albumin Random Urine Quantitative with Creat Ratio    3. Hyperlipidemia LDL goal  <100  Stable.   - simvastatin (ZOCOR) 10 MG tablet; TAKE ONE TABLET BY MOUTH AT BEDTIME  Dispense: 90 tablet; Refill: 1  - LDL cholesterol direct    4. Hypertension goal BP (blood pressure) < 140/90  Stable.   - lisinopril (PRINIVIL/ZESTRIL) 10 MG tablet; Take 1 tablet (10 mg) by mouth daily  Dispense: 90 tablet; Refill: 1    5. Special screening for malignant neoplasms, colon    - GASTROENTEROLOGY ADULT REF PROCEDURE ONLY    6. Impotence of organic origin  Will add sildenafil for impotence.  Symptomatic care and follow up discussed.  - sildenafil (REVATIO) 20 MG tablet; Take 20-60 mg as needed 30 minutes prior to intercourse  Dispense: 30 tablet; Refill: 3    COUNSELING:   Reviewed preventive health counseling, as reflected in patient instructions    BP Readings from Last 1 Encounters:   04/09/19 118/72     Estimated body mass index is 34.58 kg/m  as calculated from the following:    Height as of this encounter: 1.829 m (6').    Weight as of this encounter: 115.7 kg (255 lb).      Weight management plan: Discussed healthy diet and exercise guidelines     reports that he quit smoking about 14 years ago. He has never used smokeless tobacco.      Counseling Resources:  ATP IV Guidelines  Pooled Cohorts Equation Calculator  FRAX Risk Assessment  ICSI Preventive Guidelines  Dietary Guidelines for Americans, 2010  USDA's MyPlate  ASA Prophylaxis  Lung CA Screening    NIURKA Escobar [facility]

## 2019-04-09 NOTE — NURSING NOTE
Chief Complaint   Patient presents with     Physical       Initial /72 (BP Location: Right arm, Patient Position: Sitting, Cuff Size: Adult Large)   Pulse 86   Temp 98.4  F (36.9  C) (Tympanic)   Resp 18   Ht 1.829 m (6')   Wt 115.7 kg (255 lb)   BMI 34.58 kg/m   Estimated body mass index is 34.58 kg/m  as calculated from the following:    Height as of this encounter: 1.829 m (6').    Weight as of this encounter: 115.7 kg (255 lb).    Patient presents to the clinic using No DME    Health Maintenance that is potentially due pending provider review:  NONE    n/a    Is there anyone who you would like to be able to receive your results? No  If yes have patient fill out MIGNON    Sujatha Lopez CMA

## 2019-07-10 ENCOUNTER — TELEPHONE (OUTPATIENT)
Dept: FAMILY MEDICINE | Facility: CLINIC | Age: 49
End: 2019-07-10

## 2019-07-10 NOTE — TELEPHONE ENCOUNTER
Panel Management Review      Patient has the following on his problem list:     Diabetes    ASA: Passed    Last A1C  Lab Results   Component Value Date    A1C 8.1 04/09/2019    A1C 7.7 12/05/2018    A1C 9.3 08/31/2018    A1C 8.1 02/26/2018    A1C 7.6 02/14/2017     A1C tested: FAILED    Last LDL:    Lab Results   Component Value Date    CHOL 147 08/31/2018     Lab Results   Component Value Date    HDL 52 08/31/2018     Lab Results   Component Value Date    LDL 88 04/09/2019    LDL 82 08/31/2018     Lab Results   Component Value Date    TRIG 64 08/31/2018     Lab Results   Component Value Date    CHOLHDLRATIO 2.9 09/14/2015     Lab Results   Component Value Date    NHDL 95 08/31/2018       Is the patient on a Statin? YES             Is the patient on Aspirin? YES    Medications     HMG CoA Reductase Inhibitors     simvastatin (ZOCOR) 10 MG tablet       Salicylates     aspirin 81 MG tablet             Last three blood pressure readings:  BP Readings from Last 3 Encounters:   04/09/19 118/72   04/01/19 126/88   03/26/19 110/62       Date of last diabetes office visit: 4/9/2019     Tobacco History:     History   Smoking Status     Former Smoker     Quit date: 2/14/2005   Smokeless Tobacco     Never Used         Hypertension   Last three blood pressure readings:  BP Readings from Last 3 Encounters:   04/09/19 118/72   04/01/19 126/88   03/26/19 110/62     Blood pressure: Passed    HTN Guidelines:  Less than 140/90      Composite cancer screening  Chart review shows that this patient is due/due soon for the following Colonoscopy  Summary:    Patient is due/failing the following:   A1C and COLONOSCOPY    Action needed:   Patient needs office visit for diabetic check.     Type of outreach:    Sent [employer] Mobile message.    Questions for provider review:    None                                                                                                                                    Elham Pollack, CMA

## 2019-08-26 DIAGNOSIS — E11.9 TYPE 2 DIABETES MELLITUS WITHOUT COMPLICATION, WITHOUT LONG-TERM CURRENT USE OF INSULIN (H): ICD-10-CM

## 2019-08-26 NOTE — TELEPHONE ENCOUNTER
"Requested Prescriptions   Pending Prescriptions Disp Refills     metFORMIN (GLUCOPHAGE) 1000 MG tablet 180 tablet 1     Sig: Take 1 tablet (1,000 mg) by mouth 2 times daily (with meals)       Biguanide Agents Failed - 8/26/2019 12:40 PM        Failed - Patient has documented A1c within the specified period of time.     If HgbA1C is 8 or greater, it needs to be on file within the past 3 months.  If less than 8, must be on file within the past 6 months.     Recent Labs   Lab Test 04/09/19  1624   A1C 8.1*             Passed - Blood pressure less than 140/90 in past 6 months     BP Readings from Last 3 Encounters:   04/09/19 118/72   04/01/19 126/88   03/26/19 110/62                 Passed - Patient has documented LDL within the past 12 mos.     Recent Labs   Lab Test 04/09/19  1624   LDL 88             Passed - Patient has had a Microalbumin in the past 15 mos.     Recent Labs   Lab Test 04/09/19  1630   MICROL 29   UMALCR 28.63*             Passed - Patient is age 10 or older        Passed - Patient's CR is NOT>1.4 OR Patient's EGFR is NOT<45 within past 12 mos.     Recent Labs   Lab Test 12/05/18  0955   GFRESTIMATED 84   GFRESTBLACK >90       Recent Labs   Lab Test 12/05/18  0955   CR 0.95             Passed - Patient does NOT have a diagnosis of CHF.        Passed - Medication is active on med list        Passed - Recent (6 mo) or future (30 days) visit within the authorizing provider's specialty     Patient had office visit in the last 6 months or has a visit in the next 30 days with authorizing provider or within the authorizing provider's specialty.  See \"Patient Info\" tab in inbasket, or \"Choose Columns\" in Meds & Orders section of the refill encounter.            Last Written Prescription Date:  4/9/19  Last Fill Quantity: 180,  # refills: 1   Last office visit: 4/9/2019 with prescribing provider:  Alex   Future Office Visit:      "

## 2019-10-09 ENCOUNTER — TELEPHONE (OUTPATIENT)
Dept: FAMILY MEDICINE | Facility: CLINIC | Age: 49
End: 2019-10-09

## 2019-10-09 NOTE — TELEPHONE ENCOUNTER
Panel Management Review      Patient has the following on his problem list:     Diabetes    ASA: Passed    Last A1C  Lab Results   Component Value Date    A1C 8.1 04/09/2019    A1C 7.7 12/05/2018    A1C 9.3 08/31/2018    A1C 8.1 02/26/2018    A1C 7.6 02/14/2017     A1C tested: FAILED    Last LDL:    Lab Results   Component Value Date    CHOL 147 08/31/2018     Lab Results   Component Value Date    HDL 52 08/31/2018     Lab Results   Component Value Date    LDL 88 04/09/2019    LDL 82 08/31/2018     Lab Results   Component Value Date    TRIG 64 08/31/2018     Lab Results   Component Value Date    CHOLHDLRATIO 2.9 09/14/2015     Lab Results   Component Value Date    NHDL 95 08/31/2018       Is the patient on a Statin? YES             Is the patient on Aspirin? YES    Medications     HMG CoA Reductase Inhibitors     simvastatin (ZOCOR) 10 MG tablet       Salicylates     aspirin 81 MG tablet             Last three blood pressure readings:  BP Readings from Last 3 Encounters:   04/09/19 118/72   04/01/19 126/88   03/26/19 110/62       Date of last diabetes office visit: 4/9/2019     Tobacco History:     History   Smoking Status     Former Smoker     Quit date: 2/14/2005   Smokeless Tobacco     Never Used         Hypertension   Last three blood pressure readings:  BP Readings from Last 3 Encounters:   04/09/19 118/72   04/01/19 126/88   03/26/19 110/62     Blood pressure: Passed    HTN Guidelines:  Less than 140/90      Composite cancer screening  Chart review shows that this patient is due/due soon for the following Colonoscopy  Summary:    Patient is due/failing the following:   A1C and COLONOSCOPY    Action needed:   Patient needs office visit for diabetic check and colonoscopy     Type of outreach:    Phone, left message for patient to call back.     Questions for provider review:    None                                                                                                                                     Elham Pollack, CMA

## 2019-11-12 ENCOUNTER — TELEPHONE (OUTPATIENT)
Dept: FAMILY MEDICINE | Facility: CLINIC | Age: 49
End: 2019-11-12

## 2019-11-12 NOTE — TELEPHONE ENCOUNTER
Panel Management Review      Patient has the following on his problem list:     Diabetes    ASA: Passed    Last A1C  Lab Results   Component Value Date    A1C 8.1 04/09/2019    A1C 7.7 12/05/2018    A1C 9.3 08/31/2018    A1C 8.1 02/26/2018    A1C 7.6 02/14/2017     A1C tested: FAILED    Last LDL:    Lab Results   Component Value Date    CHOL 147 08/31/2018     Lab Results   Component Value Date    HDL 52 08/31/2018     Lab Results   Component Value Date    LDL 88 04/09/2019    LDL 82 08/31/2018     Lab Results   Component Value Date    TRIG 64 08/31/2018     Lab Results   Component Value Date    CHOLHDLRATIO 2.9 09/14/2015     Lab Results   Component Value Date    NHDL 95 08/31/2018       Is the patient on a Statin? YES             Is the patient on Aspirin? YES    Medications     HMG CoA Reductase Inhibitors     simvastatin (ZOCOR) 10 MG tablet       Salicylates     aspirin 81 MG tablet             Last three blood pressure readings:  BP Readings from Last 3 Encounters:   04/09/19 118/72   04/01/19 126/88   03/26/19 110/62       Date of last diabetes office visit: 4/9/2019     Tobacco History:     History   Smoking Status     Former Smoker     Quit date: 2/14/2005   Smokeless Tobacco     Never Used           Composite cancer screening  Chart review shows that this patient is due/due soon for the following Colonoscopy  Summary:    Patient is due/failing the following:   A1C and COLONOSCOPY    Action needed:   Patient needs office visit for diabetic check and colonoscopy .    Type of outreach:    Phone, left message for patient to call back.     Questions for provider review:    None                                                                                                                                    Elham Pollack, CMA

## 2019-12-09 DIAGNOSIS — G47.33 OBSTRUCTIVE SLEEP APNEA (ADULT) (PEDIATRIC): Primary | ICD-10-CM

## 2020-01-03 DIAGNOSIS — I10 HYPERTENSION GOAL BP (BLOOD PRESSURE) < 140/90: Chronic | ICD-10-CM

## 2020-01-03 RX ORDER — LISINOPRIL 10 MG/1
10 TABLET ORAL DAILY
Qty: 30 TABLET | Refills: 0 | Status: SHIPPED | OUTPATIENT
Start: 2020-01-03 | End: 2020-02-14

## 2020-01-03 NOTE — TELEPHONE ENCOUNTER
"Requested Prescriptions   Pending Prescriptions Disp Refills     lisinopril (PRINIVIL/ZESTRIL) 10 MG tablet [Pharmacy Med Name: LISINOPRIL 10MG] 90 tablet 0     Sig: TAKE 1 TABLET BY MOUTH ONCE DAILY       ACE Inhibitors (Including Combos) Protocol Failed - 1/3/2020  9:44 AM        Failed - Normal serum creatinine on file in past 12 months     Recent Labs   Lab Test 12/05/18  0955   CR 0.95             Failed - Normal serum potassium on file in past 12 months     Recent Labs   Lab Test 12/05/18  0955   POTASSIUM 4.1             Passed - Blood pressure under 140/90 in past 12 months     BP Readings from Last 3 Encounters:   04/09/19 118/72   04/01/19 126/88   03/26/19 110/62                 Passed - Recent (12 mo) or future (30 days) visit within the authorizing provider's specialty     Patient has had an office visit with the authorizing provider or a provider within the authorizing providers department within the previous 12 mos or has a future within next 30 days. See \"Patient Info\" tab in inbasket, or \"Choose Columns\" in Meds & Orders section of the refill encounter.              Passed - Medication is active on med list        Passed - Patient is age 18 or older        lisinopril (PRINIVIL/ZESTRIL) 10 MG tablet  Last Written Prescription Date:  04/09/2019  Last Fill Quantity: 90 tablet,  # refills: 1   Last office visit: 4/9/2019 with prescribing provider:  TRACE Johnson   Future Office Visit:      Rosalind VILLATORO (MARY) (M)    "

## 2020-01-06 DIAGNOSIS — E11.9 TYPE 2 DIABETES MELLITUS WITHOUT COMPLICATION, WITHOUT LONG-TERM CURRENT USE OF INSULIN (H): ICD-10-CM

## 2020-01-06 NOTE — TELEPHONE ENCOUNTER
"Requested Prescriptions   Pending Prescriptions Disp Refills     metFORMIN (GLUCOPHAGE) 1000 MG tablet [Pharmacy Med Name: METFORMIN 1000MG] 180 tablet 0     Sig: TAKE 1 TABLET BY MOUTH 2 TIMES DAILY WITH MEALS       Biguanide Agents Failed - 1/6/2020  8:57 AM        Failed - Blood pressure less than 140/90 in past 6 months     BP Readings from Last 3 Encounters:   04/09/19 118/72   04/01/19 126/88   03/26/19 110/62                 Failed - Patient has documented A1c within the specified period of time.     If HgbA1C is 8 or greater, it needs to be on file within the past 3 months.  If less than 8, must be on file within the past 6 months.     Recent Labs   Lab Test 04/09/19  1624   A1C 8.1*             Failed - Patient's CR is NOT>1.4 OR Patient's EGFR is NOT<45 within past 12 mos.     Recent Labs   Lab Test 12/05/18  0955   GFRESTIMATED 84   GFRESTBLACK >90       Recent Labs   Lab Test 12/05/18  0955   CR 0.95             Failed - Recent (6 mo) or future (30 days) visit within the authorizing provider's specialty     Patient had office visit in the last 6 months or has a visit in the next 30 days with authorizing provider or within the authorizing provider's specialty.  See \"Patient Info\" tab in inbasket, or \"Choose Columns\" in Meds & Orders section of the refill encounter.            Passed - Patient has documented LDL within the past 12 mos.     Recent Labs   Lab Test 04/09/19  1624   LDL 88             Passed - Patient has had a Microalbumin in the past 15 mos.     Recent Labs   Lab Test 04/09/19  1630   MICROL 29   UMALCR 28.63*             Passed - Patient is age 10 or older        Passed - Patient does NOT have a diagnosis of CHF.        Passed - Medication is active on med list        Last Written Prescription Date:  8/26/19  Last Fill Quantity: 180,  # refills: 0   Last office visit: 4/9/2019 with prescribing provider:     Future Office Visit:      "

## 2020-01-07 ENCOUNTER — TELEPHONE (OUTPATIENT)
Dept: FAMILY MEDICINE | Facility: CLINIC | Age: 50
End: 2020-01-07

## 2020-01-07 NOTE — TELEPHONE ENCOUNTER
Panel Management Review      Patient has the following on his problem list:     Diabetes    ASA: Passed    Last A1C  Lab Results   Component Value Date    A1C 8.1 04/09/2019    A1C 7.7 12/05/2018    A1C 9.3 08/31/2018    A1C 8.1 02/26/2018    A1C 7.6 02/14/2017     A1C tested: FAILED    Last LDL:    Lab Results   Component Value Date    CHOL 147 08/31/2018     Lab Results   Component Value Date    HDL 52 08/31/2018     Lab Results   Component Value Date    LDL 88 04/09/2019    LDL 82 08/31/2018     Lab Results   Component Value Date    TRIG 64 08/31/2018     Lab Results   Component Value Date    CHOLHDLRATIO 2.9 09/14/2015     Lab Results   Component Value Date    NHDL 95 08/31/2018       Is the patient on a Statin? YES             Is the patient on Aspirin? YES    Medications     HMG CoA Reductase Inhibitors     simvastatin (ZOCOR) 10 MG tablet       Salicylates     aspirin 81 MG tablet             Last three blood pressure readings:  BP Readings from Last 3 Encounters:   04/09/19 118/72   04/01/19 126/88   03/26/19 110/62       Date of last diabetes office visit: 4/9/2019     Tobacco History:     History   Smoking Status     Former Smoker     Quit date: 2/14/2005   Smokeless Tobacco     Never Used           Composite cancer screening  Chart review shows that this patient is due/due soon for the following Colonoscopy  Summary:    Patient is due/failing the following:   A1C and COLONOSCOPY    Action needed:   Patient needs office visit.    Type of outreach:    Phone, left message for patient to call back.     Questions for provider review:    None                                                                                                                                    Elham Pollack, CMA

## 2020-01-16 DIAGNOSIS — I10 HYPERTENSION GOAL BP (BLOOD PRESSURE) < 140/90: Chronic | ICD-10-CM

## 2020-01-16 RX ORDER — LISINOPRIL 10 MG/1
10 TABLET ORAL DAILY
Qty: 30 TABLET | Refills: 0 | Status: CANCELLED | OUTPATIENT
Start: 2020-01-16

## 2020-01-16 NOTE — LETTER
[facility]  [address] [place] [id number]  Phone: [phone]  Fax: [phone]       January 16, 2020         Osmar Angel  [address] [place] [id number]            Dear Osmar:    We are concerned about your health care.  We recently provided you with medication refills for your Lisinopril 10 mg.  Many medications require routine follow-up with your doctor. You are due for office appointment and labs.     Your prescription(s) have been refilled for One month so you may have time for the above noted follow-up. Please call to schedule soon so we can assure you have an appointment before your next refills are needed.    Thank you,      Sujatha Johnson, KETTY/ tiffanie

## 2020-01-16 NOTE — TELEPHONE ENCOUNTER
Pharmacy request for Lisinopril 10 mg.  I spoke to pharmacy staff and they did fill #30 that was sent on 1/3/20    Not needed at this time.    Letter will be sent to patient that he needs apt for further refill.

## 2020-02-07 ENCOUNTER — OFFICE VISIT (OUTPATIENT)
Dept: ORTHOPEDICS | Facility: CLINIC | Age: 50
End: 2020-02-07
Payer: COMMERCIAL

## 2020-02-07 VITALS — BODY MASS INDEX: 34.58 KG/M2 | DIASTOLIC BLOOD PRESSURE: 78 MMHG | HEIGHT: 72 IN | SYSTOLIC BLOOD PRESSURE: 130 MMHG

## 2020-02-07 DIAGNOSIS — M18.12 ARTHRITIS OF CARPOMETACARPAL (CMC) JOINT OF LEFT THUMB: Primary | ICD-10-CM

## 2020-02-07 PROCEDURE — 20604 DRAIN/INJ JOINT/BURSA W/US: CPT | Mod: LT | Performed by: FAMILY MEDICINE

## 2020-02-07 PROCEDURE — 99213 OFFICE O/P EST LOW 20 MIN: CPT | Mod: 25 | Performed by: FAMILY MEDICINE

## 2020-02-07 RX ADMIN — ROPIVACAINE HYDROCHLORIDE 1 ML: 5 INJECTION, SOLUTION EPIDURAL; INFILTRATION; PERINEURAL at 15:55

## 2020-02-07 RX ADMIN — BETAMETHASONE SODIUM PHOSPHATE AND BETAMETHASONE ACETATE 6 MG: 3; 3 INJECTION, SUSPENSION INTRA-ARTICULAR; INTRALESIONAL; INTRAMUSCULAR; SOFT TISSUE at 15:55

## 2020-02-07 NOTE — LETTER
2/7/2020         RE: Osmar Angel  [address] [facility] [id number]        Dear Colleague,    Thank you for referring your patient, Osmar Angel, to the [facility]. Please see a copy of my visit note below.    ASSESSMENT & PLAN  Osmar was seen today for pain.    Diagnoses and all orders for this visit:    Arthritis of carpometacarpal (CMC) joint of left thumb      Assessment:    Patient is a 48 year old male presenting for evaluation of   Chief Complaint   Patient presents with     Left Thumb - Pain     # CMC Arthrosis:  Likely CMC arthrosis given focal location, neg Finklestein's and findings on imaging.  No triggering noted  Previous CMC steroid injection provided significant relief.   Pt reported significant improvement after repeat steroid. Aftercares given f/u PRN.  Treatment: steroid injection, OTC bracing  Physical Therapy none  Injection repeat completed today  Medications Limited NSAIDs/Tylenol    Concerning signs/sx that would warrant urgent evaluation were discussed.  All questions were answered, patient understands and agrees with plan.      No follow-ups on file.    -----    SUBJECTIVE  Osmar Angel is a/an 48 year old Right handed male who is seen in consultation at the request of  Cinthya Johnson C.N.P. for evaluation of left hand pain. The patient is seen by themselves.    Onset: 1 years(s) ago. Patient describes injury as overuse.  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    Location of Pain: left wrist   Rating of Pain at worst: 7/10  Rating of Pain Currently: 2/10  Worsened by: impact  Better with: rest  Treatments tried: ice, heat and massage   Associated symptoms: no distal numbness or tingling; denies swelling or warmth  Orthopedic history: YES - 15 years ago- trigger thumb   Relevant surgical history: NO    Interim History - February 7,  2020  Since last visit on 19  patient has worsening thumb pain.  CMC injection completed on 19 provided good relief for 6-7 mon.  No interim injury.       Past Medical History:   Diagnosis Date     Amblyopia, unspecified      Myopia      Profound impairment, one eye, impairment level not further specified     left eye loss of direct vision     Sprain of ankle, unspecified site      Social History     Socioeconomic History     Marital status:      Spouse name: Not on file     Number of children: Not on file     Years of education: Not on file     Highest education level: Not on file   Occupational History     Not on file   Social Needs     Financial resource strain: Not on file     Food insecurity:     Worry: Not on file     Inability: Not on file     Transportation needs:     Medical: Not on file     Non-medical: Not on file   Tobacco Use     Smoking status: Former Smoker     Last attempt to quit: 2005     Years since quittin.1     Smokeless tobacco: Never Used   Substance and Sexual Activity     Alcohol use: Yes     Alcohol/week: 0.0 oz     Comment: rarely     Drug use: No     Sexual activity: Yes     Partners: Female     Birth control/protection: Surgical   Lifestyle     Physical activity:     Days per week: Not on file     Minutes per session: Not on file     Stress: Not on file   Relationships     Social connections:     Talks on phone: Not on file     Gets together: Not on file     Attends Zoroastrianism service: Not on file     Active member of club or organization: Not on file     Attends meetings of clubs or organizations: Not on file     Relationship status: Not on file     Intimate partner violence:     Fear of current or ex partner: Not on file     Emotionally abused: Not on file     Physically abused: Not on file     Forced sexual activity: Not on file   Other Topics Concern      Service No     Blood Transfusions No     Caffeine Concern No     Occupational Exposure No      Hobby Hazards No     Sleep Concern Yes     Stress Concern No     Weight Concern No     Special Diet No     Back Care No     Exercise Yes     Bike Helmet Not Asked     Seat Belt Yes     Self-Exams Yes     Parent/sibling w/ CABG, MI or angioplasty before 65F 55M? No   Social History Narrative     Not on file         Patient's past medical, surgical, social, and family histories were reviewed today and no changes are noted.  No family history pertinent to the patient's problem today    REVIEW OF SYSTEMS:  10 point ROS is negative other than symptoms noted above in HPI, Past Medical History or as stated below  Constitutional: NEGATIVE for fever, chills, change in weight  Skin: NEGATIVE for worrisome rashes, moles or lesions  GI/: NEGATIVE for bowel or bladder changes  Neuro: NEGATIVE for weakness, dizziness or paresthesias    OBJECTIVE:  /78   Ht 1.829 m (6')   BMI 34.58 kg/m      General: healthy, alert and in no distress  HEENT: no scleral icterus or conjunctival erythema  Skin: no suspicious lesions or rash. No jaundice.  CV: regular rhythm by palpation  Resp: normal respiratory effort without conversational dyspnea   Psych: normal mood and affect  Gait: normal steady gait with appropriate coordination and balance  Neuro: normal light touch sensory exam of the bilateral hands.    MSK:  LEFT HAND  Inspection:    No swelling or obvious deformity or asymmetry  Palpation:   Carpals: normal   Metacarpals: normal   Thumb: CMC   Fingers: normal  Range of Motion:    Full active flexion and extension at MCP, PIP, and DIP joints; normal finger cascade without malrotation.  Wrist pronation, supination, and ulnar/radial deviation normal.  Strength: Mild pain on thumb with strength testing otherwise full     full, extension full, flexion full, abduction full, adduction full, opposition full  Special Tests:    Positive: CMC grind    Negative: palpable triggering, thenar eminence wasting, hypothenar eminence wasting,  Tinel's, Phalen's, Finkelstein's, flexor digitorum superficialis testing, flexor digitorum profundus testing    Independent visualization of the below image:  No results found for this or any previous visit (from the past 24 hour(s)).  XR WRIST LEFT G/E 3 VIEWS 4/1/2019 3:12 PM     HISTORY: Left wrist pain.     COMPARISON: None.     FINDINGS: No fracture or malalignment. Mild osseous degenerative  change at the basilar joint of the thumb. Osseous structures otherwise  appear normal.                                                                      IMPRESSION: Mild osseous degenerative change at the basilar joint of  the thumb.     LANRE HARDIN MD    Small Joint Injection/Arthrocentesis: R thumb CMC  Date/Time: 2/7/2020 3:55 PM  Performed by: Sarbjit King MD  Authorized by: Sarbjit King MD   Indications:  Pain  Needle Size:  27 G  Guidance: ultrasound     Approach:  Radial  Location:  Thumb    Site:  R thumb CMC                    Medications:  6 mg betamethasone acet & sod phos 6 (3-3) MG/ML; 1 mL ropivacaine 5 MG/ML        Outcome:  Tolerated well, no immediate complications  Procedure discussed: discussed risks, benefits, and alternatives    Consent Given by:  Patient  Timeout: timeout called immediately prior to procedure    Prep: patient was prepped and draped in usual sterile fashion       Patient reported significant improvement of pain after right thumb CMC injection.  Aftercare instructions given to patient.  Plan to follow-up as discussed above.     MD [facility]            Patient's conditions were thoroughly discussed during today's visit with greater than 50% of the visit spent counseling the patient with total time spent face-to-face with the patient being 30 minutes.    MD [facility]        Again, thank you for allowing me to participate in the care of your patient.         Sincerely,        Sarbjit King MD

## 2020-02-07 NOTE — PROGRESS NOTES
ASSESSMENT & PLAN  Osmar was seen today for pain.    Diagnoses and all orders for this visit:    Arthritis of carpometacarpal (CMC) joint of left thumb      Assessment:    Patient is a 48 year old male presenting for evaluation of   Chief Complaint   Patient presents with     Left Thumb - Pain     # CMC Arthrosis:  Likely CMC arthrosis given focal location, neg Finklestein's and findings on imaging.  No triggering noted  Previous CMC steroid injection provided significant relief.   Pt reported significant improvement after repeat steroid. Aftercares given f/u PRN.  Treatment: steroid injection, OTC bracing  Physical Therapy none  Injection repeat completed today  Medications Limited NSAIDs/Tylenol    Concerning signs/sx that would warrant urgent evaluation were discussed.  All questions were answered, patient understands and agrees with plan.      No follow-ups on file.    -----    SUBJECTIVE  Osmar Angel is a/an 48 year old Right handed male who is seen in consultation at the request of  Cinthya Johnson C.N.P. for evaluation of left hand pain. The patient is seen by themselves.    Onset: 1 years(s) ago. Patient describes injury as overuse.  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    Location of Pain: left wrist   Rating of Pain at worst: 7/10  Rating of Pain Currently: 2/10  Worsened by: impact  Better with: rest  Treatments tried: ice, heat and massage   Associated symptoms: no distal numbness or tingling; denies swelling or warmth  Orthopedic history: YES - 15 years ago- trigger thumb   Relevant surgical history: NO    Interim History - February 7, 2020  Since last visit on 4/1/19  patient has worsening thumb pain.  CMC injection completed on 4/1/19 provided good relief for 6-7 mon.  No interim injury.       Past Medical History:   Diagnosis Date     Amblyopia, unspecified      Myopia      Profound  impairment, one eye, impairment level not further specified     left eye loss of direct vision     Sprain of ankle, unspecified site      Social History     Socioeconomic History     Marital status:      Spouse name: Not on file     Number of children: Not on file     Years of education: Not on file     Highest education level: Not on file   Occupational History     Not on file   Social Needs     Financial resource strain: Not on file     Food insecurity:     Worry: Not on file     Inability: Not on file     Transportation needs:     Medical: Not on file     Non-medical: Not on file   Tobacco Use     Smoking status: Former Smoker     Last attempt to quit: 2005     Years since quittin.1     Smokeless tobacco: Never Used   Substance and Sexual Activity     Alcohol use: Yes     Alcohol/week: 0.0 oz     Comment: rarely     Drug use: No     Sexual activity: Yes     Partners: Female     Birth control/protection: Surgical   Lifestyle     Physical activity:     Days per week: Not on file     Minutes per session: Not on file     Stress: Not on file   Relationships     Social connections:     Talks on phone: Not on file     Gets together: Not on file     Attends Yazidism service: Not on file     Active member of club or organization: Not on file     Attends meetings of clubs or organizations: Not on file     Relationship status: Not on file     Intimate partner violence:     Fear of current or ex partner: Not on file     Emotionally abused: Not on file     Physically abused: Not on file     Forced sexual activity: Not on file   Other Topics Concern      Service No     Blood Transfusions No     Caffeine Concern No     Occupational Exposure No     Hobby Hazards No     Sleep Concern Yes     Stress Concern No     Weight Concern No     Special Diet No     Back Care No     Exercise Yes     Bike Helmet Not Asked     Seat Belt Yes     Self-Exams Yes     Parent/sibling w/ CABG, MI or angioplasty before 65F  55M? No   Social History Narrative     Not on file         Patient's past medical, surgical, social, and family histories were reviewed today and no changes are noted.  No family history pertinent to the patient's problem today    REVIEW OF SYSTEMS:  10 point ROS is negative other than symptoms noted above in HPI, Past Medical History or as stated below  Constitutional: NEGATIVE for fever, chills, change in weight  Skin: NEGATIVE for worrisome rashes, moles or lesions  GI/: NEGATIVE for bowel or bladder changes  Neuro: NEGATIVE for weakness, dizziness or paresthesias    OBJECTIVE:  /78   Ht 1.829 m (6')   BMI 34.58 kg/m     General: healthy, alert and in no distress  HEENT: no scleral icterus or conjunctival erythema  Skin: no suspicious lesions or rash. No jaundice.  CV: regular rhythm by palpation  Resp: normal respiratory effort without conversational dyspnea   Psych: normal mood and affect  Gait: normal steady gait with appropriate coordination and balance  Neuro: normal light touch sensory exam of the bilateral hands.    MSK:  LEFT HAND  Inspection:    No swelling or obvious deformity or asymmetry  Palpation:   Carpals: normal   Metacarpals: normal   Thumb: CMC   Fingers: normal  Range of Motion:    Full active flexion and extension at MCP, PIP, and DIP joints; normal finger cascade without malrotation.  Wrist pronation, supination, and ulnar/radial deviation normal.  Strength: Mild pain on thumb with strength testing otherwise full     full, extension full, flexion full, abduction full, adduction full, opposition full  Special Tests:    Positive: CMC grind    Negative: palpable triggering, thenar eminence wasting, hypothenar eminence wasting, Tinel's, Phalen's, Finkelstein's, flexor digitorum superficialis testing, flexor digitorum profundus testing    Independent visualization of the below image:  No results found for this or any previous visit (from the past 24 hour(s)).  XR WRIST LEFT G/E 3  VIEWS 4/1/2019 3:12 PM     HISTORY: Left wrist pain.     COMPARISON: None.     FINDINGS: No fracture or malalignment. Mild osseous degenerative  change at the basilar joint of the thumb. Osseous structures otherwise  appear normal.                                                                      IMPRESSION: Mild osseous degenerative change at the basilar joint of  the thumb.     LANRE HARDIN MD    Small Joint Injection/Arthrocentesis: L thumb CMC  Date/Time: 2/7/2020 3:55 PM  Performed by: Sarbjit King MD  Authorized by: Sarbjit King MD   Indications:  Pain  Needle Size:  27 G  Guidance: ultrasound     Approach:  Radial  Location:  Thumb    Site:  L thumb CMC                    Medications:  6 mg betamethasone acet & sod phos 6 (3-3) MG/ML; 1 mL ropivacaine 5 MG/ML        Outcome:  Tolerated well, no immediate complications  Procedure discussed: discussed risks, benefits, and alternatives    Consent Given by:  Patient  Timeout: timeout called immediately prior to procedure    Prep: patient was prepped and draped in usual sterile fashion       Patient reported significant improvement of pain after left thumb CMC injection.  Aftercare instructions given to patient.  Plan to follow-up as discussed above.     MD [facility]            Patient's conditions were thoroughly discussed during today's visit with greater than 50% of the visit spent counseling the patient with total time spent face-to-face with the patient being 30 minutes.    MD [facility]

## 2020-02-10 RX ORDER — ROPIVACAINE HYDROCHLORIDE 5 MG/ML
1 INJECTION, SOLUTION EPIDURAL; INFILTRATION; PERINEURAL
Status: SHIPPED | OUTPATIENT
Start: 2020-02-07

## 2020-02-10 RX ORDER — BETAMETHASONE SODIUM PHOSPHATE AND BETAMETHASONE ACETATE 3; 3 MG/ML; MG/ML
6 INJECTION, SUSPENSION INTRA-ARTICULAR; INTRALESIONAL; INTRAMUSCULAR; SOFT TISSUE
Status: SHIPPED | OUTPATIENT
Start: 2020-02-07

## 2020-02-12 DIAGNOSIS — E78.5 HYPERLIPIDEMIA LDL GOAL <100: ICD-10-CM

## 2020-02-12 DIAGNOSIS — I10 HYPERTENSION GOAL BP (BLOOD PRESSURE) < 140/90: Chronic | ICD-10-CM

## 2020-02-12 DIAGNOSIS — E11.9 TYPE 2 DIABETES MELLITUS WITHOUT COMPLICATION, WITHOUT LONG-TERM CURRENT USE OF INSULIN (H): ICD-10-CM

## 2020-02-12 NOTE — TELEPHONE ENCOUNTER
"Requested Prescriptions   Pending Prescriptions Disp Refills     simvastatin (ZOCOR) 10 MG tablet [Pharmacy Med Name: SIMVASTATIN 10MG] 30 tablet 2     Sig: TAKE 1 TABLET BY MOUTH AT BEDTIME       Statins Protocol Passed - 2/12/2020  9:52 AM        Passed - LDL on file in past 12 months     Recent Labs   Lab Test 04/09/19  1624   LDL 88             Passed - No abnormal creatine kinase in past 12 months     No lab results found.             Passed - Recent (12 mo) or future (30 days) visit within the authorizing provider's specialty     Patient has had an office visit with the authorizing provider or a provider within the authorizing providers department within the previous 12 mos or has a future within next 30 days. See \"Patient Info\" tab in inbasket, or \"Choose Columns\" in Meds & Orders section of the refill encounter.      Last Written Prescription Date:  4/9/19  Last Fill Quantity: 90,  # refills: 1   Last office visit: 4/9/2019 with prescribing provider:     Future Office Visit:              Passed - Medication is active on med list        Passed - Patient is age 18 or older        metFORMIN (GLUCOPHAGE) 1000 MG tablet [Pharmacy Med Name: METFORMIN 1000MG] 180 tablet 0     Sig: TAKE 1 TABLET BY MOUTH 2 TIMES DAILY WITH MEALS NEEDS TO BE SEEN NOW       Biguanide Agents Failed - 2/12/2020  9:52 AM        Failed - Patient has documented A1c within the specified period of time.     If HgbA1C is 8 or greater, it needs to be on file within the past 3 months.  If less than 8, must be on file within the past 6 months.     Recent Labs   Lab Test 04/09/19  1624   A1C 8.1*             Failed - Patient's CR is NOT>1.4 OR Patient's EGFR is NOT<45 within past 12 mos.     Recent Labs   Lab Test 12/05/18  0955   GFRESTIMATED 84   GFRESTBLACK >90       Recent Labs   Lab Test 12/05/18  0955   CR 0.95             Failed - Recent (6 mo) or future (30 days) visit within the authorizing provider's specialty     Patient had office " "visit in the last 6 months or has a visit in the next 30 days with authorizing provider or within the authorizing provider's specialty.  See \"Patient Info\" tab in inbasket, or \"Choose Columns\" in Meds & Orders section of the refill encounter.     Last Written Prescription Date:  1/6/20  Last Fill Quantity: 180,  # refills: 0   Last office visit: 4/9/2019 with prescribing provider:     Future Office Visit:               Passed - Blood pressure less than 140/90 in past 6 months     BP Readings from Last 3 Encounters:   02/07/20 130/78   04/09/19 118/72   04/01/19 126/88                 Passed - Patient has documented LDL within the past 12 mos.     Recent Labs   Lab Test 04/09/19  1624   LDL 88             Passed - Patient has had a Microalbumin in the past 15 mos.     Recent Labs   Lab Test 04/09/19  1630   MICROL 29   UMALCR 28.63*             Passed - Patient is age 10 or older        Passed - Patient does NOT have a diagnosis of CHF.        Passed - Medication is active on med list        lisinopril (PRINIVIL/ZESTRIL) 10 MG tablet [Pharmacy Med Name: LISINOPRIL 10MG] 30 tablet 0     Sig: TAKE 1 TABLET BY MOUTH ONCE DAILY - NEEDS TO BE SEEN!!!       ACE Inhibitors (Including Combos) Protocol Failed - 2/12/2020  9:52 AM        Failed - Normal serum creatinine on file in past 12 months     Recent Labs   Lab Test 12/05/18  0955   CR 0.95             Failed - Normal serum potassium on file in past 12 months     Recent Labs   Lab Test 12/05/18  0955   POTASSIUM 4.1             Passed - Blood pressure under 140/90 in past 12 months     BP Readings from Last 3 Encounters:   02/07/20 130/78   04/09/19 118/72   04/01/19 126/88                 Passed - Recent (12 mo) or future (30 days) visit within the authorizing provider's specialty     Patient has had an office visit with the authorizing provider or a provider within the authorizing providers department within the previous 12 mos or has a future within next 30 days. See " "\"Patient Info\" tab in inbasket, or \"Choose Columns\" in Meds & Orders section of the refill encounter.      Last Written Prescription Date:  1/3/20  Last Fill Quantity: 30,  # refills: 0   Last office visit: 4/9/2019 with prescribing provider:     Future Office Visit:                Passed - Medication is active on med list        Passed - Patient is age 18 or older          "

## 2020-02-13 ENCOUNTER — DOCUMENTATION ONLY (OUTPATIENT)
Dept: LAB | Facility: CLINIC | Age: 50
End: 2020-02-13

## 2020-02-13 DIAGNOSIS — E78.5 HYPERLIPIDEMIA LDL GOAL <100: Primary | ICD-10-CM

## 2020-02-13 DIAGNOSIS — E78.5 HYPERLIPIDEMIA LDL GOAL <100: ICD-10-CM

## 2020-02-13 DIAGNOSIS — E11.9 TYPE 2 DIABETES MELLITUS WITHOUT COMPLICATION, WITHOUT LONG-TERM CURRENT USE OF INSULIN (H): ICD-10-CM

## 2020-02-13 LAB
ALT SERPL W P-5'-P-CCNC: 32 U/L (ref 0–70)
ANION GAP SERPL CALCULATED.3IONS-SCNC: 6 MMOL/L (ref 3–14)
BUN SERPL-MCNC: 22 MG/DL (ref 7–30)
CALCIUM SERPL-MCNC: 9.1 MG/DL (ref 8.5–10.1)
CHLORIDE SERPL-SCNC: 104 MMOL/L (ref 94–109)
CHOLEST SERPL-MCNC: 163 MG/DL
CO2 SERPL-SCNC: 27 MMOL/L (ref 20–32)
CREAT SERPL-MCNC: 0.83 MG/DL (ref 0.66–1.25)
GFR SERPL CREATININE-BSD FRML MDRD: >90 ML/MIN/{1.73_M2}
GLUCOSE SERPL-MCNC: 211 MG/DL (ref 70–99)
HBA1C MFR BLD: 7.7 % (ref 0–5.6)
HDLC SERPL-MCNC: 59 MG/DL
LDLC SERPL CALC-MCNC: 90 MG/DL
NONHDLC SERPL-MCNC: 104 MG/DL
POTASSIUM SERPL-SCNC: 4.4 MMOL/L (ref 3.4–5.3)
SODIUM SERPL-SCNC: 137 MMOL/L (ref 133–144)
TRIGL SERPL-MCNC: 72 MG/DL

## 2020-02-13 PROCEDURE — 36415 COLL VENOUS BLD VENIPUNCTURE: CPT | Performed by: NURSE PRACTITIONER

## 2020-02-13 PROCEDURE — 80061 LIPID PANEL: CPT | Performed by: NURSE PRACTITIONER

## 2020-02-13 PROCEDURE — 84460 ALANINE AMINO (ALT) (SGPT): CPT | Performed by: NURSE PRACTITIONER

## 2020-02-13 PROCEDURE — 80048 BASIC METABOLIC PNL TOTAL CA: CPT | Performed by: NURSE PRACTITIONER

## 2020-02-13 PROCEDURE — 83036 HEMOGLOBIN GLYCOSYLATED A1C: CPT | Performed by: NURSE PRACTITIONER

## 2020-02-14 RX ORDER — SIMVASTATIN 10 MG
TABLET ORAL
Qty: 30 TABLET | Refills: 0 | Status: SHIPPED | OUTPATIENT
Start: 2020-02-14 | End: 2020-02-18

## 2020-02-14 RX ORDER — LISINOPRIL 10 MG/1
TABLET ORAL
Qty: 30 TABLET | Refills: 0 | Status: SHIPPED | OUTPATIENT
Start: 2020-02-14 | End: 2020-02-18

## 2020-02-18 ENCOUNTER — TELEPHONE (OUTPATIENT)
Dept: FAMILY MEDICINE | Facility: CLINIC | Age: 50
End: 2020-02-18

## 2020-02-18 ENCOUNTER — OFFICE VISIT (OUTPATIENT)
Dept: FAMILY MEDICINE | Facility: CLINIC | Age: 50
End: 2020-02-18
Payer: COMMERCIAL

## 2020-02-18 VITALS
HEART RATE: 84 BPM | BODY MASS INDEX: 33.86 KG/M2 | HEIGHT: 72 IN | DIASTOLIC BLOOD PRESSURE: 68 MMHG | SYSTOLIC BLOOD PRESSURE: 110 MMHG | RESPIRATION RATE: 16 BRPM | WEIGHT: 250 LBS | TEMPERATURE: 97.6 F

## 2020-02-18 DIAGNOSIS — E11.9 TYPE 2 DIABETES MELLITUS WITHOUT COMPLICATION, WITHOUT LONG-TERM CURRENT USE OF INSULIN (H): Primary | ICD-10-CM

## 2020-02-18 DIAGNOSIS — Z12.11 SPECIAL SCREENING FOR MALIGNANT NEOPLASMS, COLON: ICD-10-CM

## 2020-02-18 DIAGNOSIS — I10 HYPERTENSION GOAL BP (BLOOD PRESSURE) < 140/90: Chronic | ICD-10-CM

## 2020-02-18 DIAGNOSIS — E78.5 HYPERLIPIDEMIA LDL GOAL <100: ICD-10-CM

## 2020-02-18 LAB
CREAT UR-MCNC: 174 MG/DL
MICROALBUMIN UR-MCNC: 101 MG/L
MICROALBUMIN/CREAT UR: 58.05 MG/G CR (ref 0–17)

## 2020-02-18 PROCEDURE — 82043 UR ALBUMIN QUANTITATIVE: CPT | Performed by: NURSE PRACTITIONER

## 2020-02-18 PROCEDURE — 99214 OFFICE O/P EST MOD 30 MIN: CPT | Performed by: NURSE PRACTITIONER

## 2020-02-18 RX ORDER — SIMVASTATIN 10 MG
TABLET ORAL
Qty: 90 TABLET | Refills: 3 | Status: SHIPPED | OUTPATIENT
Start: 2020-02-18 | End: 2021-01-21

## 2020-02-18 RX ORDER — LISINOPRIL 10 MG/1
TABLET ORAL
Qty: 90 TABLET | Refills: 3 | Status: SHIPPED | OUTPATIENT
Start: 2020-02-18 | End: 2021-01-21

## 2020-02-18 ASSESSMENT — MIFFLIN-ST. JEOR: SCORE: 2031.99

## 2020-02-18 NOTE — PROGRESS NOTES
Subjective     Osmar Angel is a 49 year old male who presents to clinic today for the following health issues:    HPI   Diabetes Follow-up    How often are you checking your blood sugar? One time daily - usually only testing when he feels weird   What time of day are you checking your blood sugars (select all that apply)?  Before meals  Have you had any blood sugars above 200?  No  Have you had any blood sugars below 70?  No    What symptoms do you notice when your blood sugar is low?  Shaky    What concerns do you have today about your diabetes? None     Do you have any of these symptoms? (Select all that apply)  No numbness or tingling in feet.  No redness, sores or blisters on feet.  No complaints of excessive thirst.  No reports of blurry vision.  No significant changes to weight.    Have you had a diabetic eye exam in the last 12 months? Yes- Date of last eye exam: 3/2020,  Location: [facility]     Hyperlipidemia Follow-Up      Are you regularly taking any medication or supplement to lower your cholesterol?   Yes- simvastatin     Are you having muscle aches or other side effects that you think could be caused by your cholesterol lowering medication?  No    Hypertension Follow-up      Do you check your blood pressure regularly outside of the clinic? Yes     Are you following a low salt diet? No    Are your blood pressures ever more than 140 on the top number (systolic) OR more   than 90 on the bottom number (diastolic), for example 140/90? No    BP Readings from Last 2 Encounters:   02/18/20 110/68   02/07/20 130/78     Hemoglobin A1C (%)   Date Value   02/13/2020 7.7 (H)   04/09/2019 8.1 (H)     LDL Cholesterol Calculated (mg/dL)   Date Value   02/13/2020 90   08/31/2018 82     LDL Cholesterol Direct (mg/dL)   Date Value   04/09/2019 88     Patient Active Problem List   Diagnosis     Profound impairment, one eye, impairment level not further specified     Pain in joint, upper arm     External hemorrhoids      Hypertension goal BP (blood pressure) < 140/90     Type 2 diabetes mellitus without complications (H)     Hyperlipidemia LDL goal <100     Obesity, Class II, BMI 35-39.9, with comorbidity     VERONA (obstructive sleep apnea)-AHI 71     Past Surgical History:   Procedure Laterality Date     C REPAIR ING HERNIA, INFANT,REDUC      bilateral     ROTATOR CUFF REPAIR RT/LT      right arthroscopic     SURGICAL HISTORY OF -       right knee arthroscopy     SURGICAL HISTORY OF -       umbilical hernia repair     VASECTOMY         Social History     Tobacco Use     Smoking status: Former Smoker     Last attempt to quit: 2005     Years since quitting: 15.0     Smokeless tobacco: Never Used   Substance Use Topics     Alcohol use: Yes     Alcohol/week: 0.0 standard drinks     Comment: rarely     Family History   Problem Relation Age of Onset     Diabetes Maternal Grandfather      C.A.D. Paternal Grandmother      C.A.D. Paternal Grandfather      Muscular Dystrophy Paternal Grandfather      Cardiovascular Father         a. fib     Cerebrovascular Disease Father      Heart Disease Father         pacemaker     Asthma No family hx of      Hypertension No family hx of      Breast Cancer No family hx of      Cancer - colorectal No family hx of      Prostate Cancer No family hx of          Current Outpatient Medications   Medication Sig Dispense Refill     aspirin 81 MG tablet Take 1 tablet by mouth daily.       blood glucose monitoring (ACCU-CHEK FASTCLIX) lancets 1 each daily Use to test blood sugar 1 times daily or as directed. 102 each 11     blood glucose monitoring (ACCU-CHEK SMARTVIEW) test strip 1 strip by In Vitro route 3 times daily 100 each 11     CINNAMON PO        lisinopril 10 MG PO tablet TAKE 1 TABLET BY MOUTH ONCE DAILY 90 tablet 3     metFORMIN 1000 MG PO tablet TAKE 1 TABLET BY MOUTH 2 TIMES DAILY WITH MEALS 180 tablet 1     Omega-3 Fatty Acids (OMEGA-3 FISH OIL PO)        order for DME Equipment being  ordered: CPAP Osmar Angel  received a Tanya Respironics DreamStation Auto CPAP Auto. Pressures were set at Auto 11 - 15 cm H2O.       sildenafil (REVATIO) 20 MG tablet Take 20-60 mg as needed 30 minutes prior to intercourse 30 tablet 3     simvastatin 10 MG PO tablet TAKE 1 TABLET BY MOUTH AT BEDTIME 90 tablet 3     VITAMIN D, CHOLECALCIFEROL, PO Take by mouth daily       glipiZIDE 2.5 MG PO 24 hr tablet Take 1 tablet (2.5 mg) by mouth daily 90 tablet 1     Allergies   Allergen Reactions     Wellbutrin [Bupropion Hcl] Swelling       Reviewed and updated as needed this visit by Provider  Tobacco  Allergies  Meds  Problems  Med Hx  Surg Hx  Fam Hx         Review of Systems   ROS COMP: Constitutional, HEENT, cardiovascular, pulmonary, gi and gu systems are negative, except as otherwise noted.      Objective    /68 (Cuff Size: Adult Large)   Pulse 84   Temp 97.6  F (36.4  C) (Tympanic)   Resp 16   Ht 1.829 m (6')   Wt 113.4 kg (250 lb)   BMI 33.91 kg/m    Body mass index is 33.91 kg/m .  Physical Exam   GENERAL: healthy, alert and no distress  NECK: no adenopathy, no asymmetry, masses, or scars and thyroid normal to palpation  RESP: lungs clear to auscultation - no rales, rhonchi or wheezes  CV: regular rate and rhythm, normal S1 S2, no S3 or S4, no murmur, click or rub, no peripheral edema and peripheral pulses strong  ABDOMEN: soft, nontender, no hepatosplenomegaly, no masses and bowel sounds normal  PSYCH: mentation appears normal, affect normal/bright    Diagnostic Test Results:  Results for orders placed or performed in visit on 02/18/20   Albumin Random Urine Quantitative with Creat Ratio     Status: Abnormal   Result Value Ref Range    Creatinine Urine 174 mg/dL    Albumin Urine mg/L 101 mg/L    Albumin Urine mg/g Cr 58.05 (H) 0 - 17 mg/g Cr           Assessment & Plan     1. Type 2 diabetes mellitus without complication, without long-term current use of insulin (H)  Borderline control.   Recommend adding another agent for better control of blood sugars.  Unable to get affordable coverage for newer medications even with coupons, will start low dose glipizide and monitor blood sugars.   - metFORMIN 1000 MG PO tablet; TAKE 1 TABLET BY MOUTH 2 TIMES DAILY WITH MEALS  Dispense: 180 tablet; Refill: 1  - simvastatin 10 MG PO tablet; TAKE 1 TABLET BY MOUTH AT BEDTIME  Dispense: 90 tablet; Refill: 3  - Albumin Random Urine Quantitative with Creat Ratio    2. Hypertension goal BP (blood pressure) < 140/90  Stable.   - lisinopril 10 MG PO tablet; TAKE 1 TABLET BY MOUTH ONCE DAILY  Dispense: 90 tablet; Refill: 3    3. Hyperlipidemia LDL goal <100  Stable.   - simvastatin 10 MG PO tablet; TAKE 1 TABLET BY MOUTH AT BEDTIME  Dispense: 90 tablet; Refill: 3    4. Special screening for malignant neoplasms, colon    - GASTROENTEROLOGY ADULT REF PROCEDURE ONLY [facility] [phone]     BMI:   Estimated body mass index is 33.91 kg/m  as calculated from the following:    Height as of this encounter: 1.829 m (6').    Weight as of this encounter: 113.4 kg (250 lb).   Weight management plan: Discussed healthy diet and exercise guidelines    Home care instructions were reviewed with the patient. The risks, benefits and treatment options of prescribed medications or other treatments have been discussed with the patient. The patient verbalized their understanding and should call or follow up if no improvement or if they develop further problems.    Return in about 3 months (around 5/18/2020) for Diabetes Check.    NIURKA Rosario [facility]

## 2020-02-18 NOTE — TELEPHONE ENCOUNTER
Reason for Call:  Other     Detailed comments: Patient stopped in wanting a med change - his medication below is in the $500.00 range - Please advise  empagliflozin (JARDIANCE) 10 MG PO TABS tablet 90 tablet 1 2/18/2020  --   Sig - Route: Take 1 tablet (10 mg) by mouth daily - Oral   Sent to pharmacy as: Empagliflozin 10 MG Oral Tablet (Jardiance         Phone Number Patient can be reached at: Cell number on file:    Telephone Information:   Mobile [phone]       Best Time:     Can we leave a detailed message on this number? YES    Call taken on 2/18/2020 at 3:50 PM by Claudia Madera

## 2020-02-18 NOTE — PATIENT INSTRUCTIONS
Add the Jardiance 10 mg daily     Urine today    Recheck in 3-6 months, sooner if needed    Schedule your colonoscopy

## 2020-02-19 ENCOUNTER — HOSPITAL ENCOUNTER (OUTPATIENT)
Facility: CLINIC | Age: 50
End: 2020-02-19
Attending: SURGERY | Admitting: SURGERY
Payer: COMMERCIAL

## 2020-02-21 DIAGNOSIS — E11.9 TYPE 2 DIABETES MELLITUS WITHOUT COMPLICATION, WITHOUT LONG-TERM CURRENT USE OF INSULIN (H): Primary | ICD-10-CM

## 2020-02-21 RX ORDER — GLIPIZIDE 2.5 MG/1
2.5 TABLET, EXTENDED RELEASE ORAL DAILY
Qty: 90 TABLET | Refills: 1 | Status: SHIPPED | OUTPATIENT
Start: 2020-02-21 | End: 2020-08-03

## 2020-02-21 NOTE — TELEPHONE ENCOUNTER
Glipizide sent to the pharmacy after speaking with pharmacist regarding coverage of medication.  Spoke to [facility] regarding plan.    NIURKA Rosario CNP

## 2020-03-02 DIAGNOSIS — E11.9 TYPE 2 DIABETES MELLITUS WITHOUT COMPLICATION, WITHOUT LONG-TERM CURRENT USE OF INSULIN (H): ICD-10-CM

## 2020-03-02 RX ORDER — BLOOD SUGAR DIAGNOSTIC
STRIP MISCELLANEOUS
Qty: 300 STRIP | Refills: 1 | Status: SHIPPED | OUTPATIENT
Start: 2020-03-02 | End: 2022-02-10

## 2020-03-02 RX ORDER — LANCETS
1 EACH MISCELLANEOUS 3 TIMES DAILY
Qty: 300 EACH | Refills: 1 | Status: SHIPPED | OUTPATIENT
Start: 2020-03-02 | End: 2021-08-30

## 2020-05-04 VITALS — BODY MASS INDEX: 34.46 KG/M2 | WEIGHT: 260 LBS | HEIGHT: 73 IN

## 2020-05-04 ASSESSMENT — MIFFLIN-ST. JEOR: SCORE: 2093.23

## 2020-05-04 NOTE — PATIENT INSTRUCTIONS
Your BMI is Body mass index is 34.3 kg/m .  Weight management is a personal decision.  If you are interested in exploring weight loss strategies, the following discussion covers the approaches that may be successful. Body mass index (BMI) is one way to tell whether you are at a healthy weight, overweight, or obese. It measures your weight in relation to your height.  A BMI of 18.5 to 24.9 is in the healthy range. A person with a BMI of 25 to 29.9 is considered overweight, and someone with a BMI of 30 or greater is considered obese. More than two-thirds of American adults are considered overweight or obese.  Being overweight or obese increases the risk for further weight gain. Excess weight may lead to heart disease and diabetes.  Creating and following plans for healthy eating and physical activity may help you improve your health.  Weight control is part of healthy lifestyle and includes exercise, emotional health, and healthy eating habits. Careful eating habits lifelong are the mainstay of weight control. Though there are significant health benefits from weight loss, long-term weight loss with diet alone may be very difficult to achieve- studies show long-term success with dietary management in less than 10% of people. Attaining a healthy weight may be especially difficult to achieve in those with severe obesity. In some cases, medications, devices and surgical management might be considered.  What can you do?  If you are overweight or obese and are interested in methods for weight loss, you should discuss this with your provider.     Consider reducing daily calorie intake by 500 calories.     Keep a food journal.     Avoiding skipping meals, consider cutting portions instead.    Diet combined with exercise helps maintain muscle while optimizing fat loss. Strength training is particularly important for building and maintaining muscle mass. Exercise helps reduce stress, increase energy, and improves fitness.  Increasing exercise without diet control, however, may not burn enough calories to loose weight.       Start walking three days a week 10-20 minutes at a time    Work towards walking thirty minutes five days a week     Eventually, increase the speed of your walking for 1-2 minutes at time    In addition, we recommend that you review healthy lifestyles and methods for weight loss available through the National Institutes of Health patient information sites:  http://win.niddk.nih.gov/publications/index.htm    And look into health and wellness programs that may be available through your health insurance provider, employer, local community center, or deepika club.    Weight management plan: Patient was referred to their PCP to discuss a diet and exercise plan.

## 2020-05-04 NOTE — PROGRESS NOTES
"Osmar Angel is a 49 year old male who is being evaluated via a billable telephone visit.      The patient has been notified of following:     \"This telephone visit will be conducted via a call between you and your physician/provider. We have found that certain health care needs can be provided without the need for a physical exam.  This service lets us provide the care you need with a short phone conversation.  If a prescription is necessary we can send it directly to your pharmacy.  If lab work is needed we can place an order for that and you can then stop by our lab to have the test done at a later time.    Telephone visits are billed at different rates depending on your insurance coverage. During this emergency period, for some insurers they may be billed the same as an in-person visit.  Please reach out to your insurance provider with any questions.    If during the course of the call the physician/provider feels a telephone visit is not appropriate, you will not be charged for this service.\"    Patient has given verbal consent for Telephone visit?  Yes    What phone number would you like to be contacted at? [phone]    How would you like to obtain your AVS? Juan Miguel    Phone call duration: 11 minutes    Obstructive Sleep Apnea - PAP Follow-Up Visit:    Chief Complaint   Patient presents with     CPAP Follow Up       Osmar Angel comes in today for follow-up of their severe sleep apnea, managed with CPAP.     Osmar Angel presented in 2015 for evaluation of snoring (10 years), witnessed apnea (7 years) and daytime fatigue (2-3 years). He had a sleep study on 8/20/2015 at the [facility] for snoring, observed apnea, excessive daytime sleepiness (ESS 12), morning dry mouth, morning headaches, large neck circumference (50 cm) and co-morbid HTN, and DMII. Sleep study demonstrated severe obstructive sleep apnea with AHI of 71, lowest oxygen saturation was 72%. RDI 71. Periodic Limb " Movement Index 0.0/hour.    August 26, 2015.  Patient received a Tanya Respironics DreamStation Auto CPAP Auto. Pressures were set at Auto 11 - 15 cm H2O.    Overall, the patient rates his experience with PAP as 9 (0 poor, 10 great). The mask is comfortable. The mask is not leaking.  He is not snoring with the mask on. He is not having gasp arousals. He is not having any oral or nasal dryness. The pressure settings are comfortable.    His PAP interface is Full Face Mask.    Bedtime is typically 10:30 PM. Usually it takes about 5 minutes to fall asleep with the mask on. Wake time is typically 5 AM.  Patient is using PAP therapy 5-6 hours per night. The patient is usually getting 5-6 hours of sleep per night.    He does feel rested in the morning.    Total score - Gordon: 3 (5/4/2020 10:00 AM)      KELSIE Total Score: 2      Respironics  Auto-PAP 12 - 18 cmH2O 30 day usage data:    93% of days with > 4 hours of use. 0/30 days with no use.   Average use 321 minutes per day.   Average leak 51.14 LPM.  Average % of night in large leak 1%.    CPAP 90% pressure 13cm.   AHI 2.59 events per hour.      Past medical/surgical history, family history, social history, medications and allergies were reviewed.      Problem List:  Patient Active Problem List    Diagnosis Date Noted     VERONA (obstructive sleep apnea)-AHI 71 08/26/2015     Priority: Medium     Severe VERONA: 8/20/2015 (AHI 71, RDI 71, rebel 72%)       Obesity, Class II, BMI 35-39.9, with comorbidity 08/12/2015     Priority: Medium     Type 2 diabetes mellitus without complications (H) 06/04/2012     Priority: Medium     Diagnosed 2011       Hyperlipidemia LDL goal <100 06/04/2012     Priority: Medium     Hypertension goal BP (blood pressure) < 140/90 01/15/2010     Priority: Medium     External hemorrhoids 10/27/2008     Priority: Medium     Pain in joint, upper arm 06/04/2007     Priority: Medium     Profound impairment, one eye, impairment level not further specified  "06/07/2005     Priority: Medium     left eye loss of direct vision          Ht 1.854 m (6' 1\")   Wt 117.9 kg (260 lb)   BMI 34.30 kg/m      Impression/Plan:    Severe obstructive sleep apnea-  Tolerating PAP well. Daytime symptoms are improved.   Continue current CPAP treatment.   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    Comprehensive DME      Osmar Angel will follow up in about 1 years.    Jt Heck PA-C    "

## 2020-05-05 ENCOUNTER — VIRTUAL VISIT (OUTPATIENT)
Dept: SLEEP MEDICINE | Facility: CLINIC | Age: 50
End: 2020-05-05
Payer: COMMERCIAL

## 2020-05-05 DIAGNOSIS — G47.33 OSA (OBSTRUCTIVE SLEEP APNEA): Primary | ICD-10-CM

## 2020-05-05 PROCEDURE — 99213 OFFICE O/P EST LOW 20 MIN: CPT | Mod: TEL | Performed by: PHYSICIAN ASSISTANT

## 2020-07-01 DIAGNOSIS — N52.9 IMPOTENCE OF ORGANIC ORIGIN: ICD-10-CM

## 2020-07-02 RX ORDER — SILDENAFIL CITRATE 20 MG/1
TABLET ORAL
Qty: 50 TABLET | Refills: 1 | Status: SHIPPED | OUTPATIENT
Start: 2020-07-02 | End: 2021-01-21

## 2020-08-03 DIAGNOSIS — E11.9 TYPE 2 DIABETES MELLITUS WITHOUT COMPLICATION, WITHOUT LONG-TERM CURRENT USE OF INSULIN (H): ICD-10-CM

## 2020-08-03 RX ORDER — GLIPIZIDE 2.5 MG/1
TABLET, EXTENDED RELEASE ORAL
Qty: 90 TABLET | Refills: 1 | Status: SHIPPED | OUTPATIENT
Start: 2020-08-03 | End: 2021-01-21

## 2020-10-06 DIAGNOSIS — E11.9 TYPE 2 DIABETES MELLITUS WITHOUT COMPLICATION, WITHOUT LONG-TERM CURRENT USE OF INSULIN (H): ICD-10-CM

## 2020-10-06 NOTE — TELEPHONE ENCOUNTER
"Requested Prescriptions   Pending Prescriptions Disp Refills     metFORMIN (GLUCOPHAGE) 1000 MG tablet [Pharmacy Med Name: METFORMIN 1000MG] 180 tablet 0     Sig: TAKE 1 TABLET BY MOUTH 2 TIMES DAILY WITH MEALS       Biguanide Agents Failed - 10/6/2020  9:00 AM        Failed - Patient has documented A1c within the specified period of time.     If HgbA1C is 8 or greater, it needs to be on file within the past 3 months.  If less than 8, must be on file within the past 6 months.     Recent Labs   Lab Test 02/13/20  0800   A1C 7.7*             Failed - Recent (6 mo) or future (30 days) visit within the authorizing provider's specialty     Patient had office visit in the last 6 months or has a visit in the next 30 days with authorizing provider or within the authorizing provider's specialty.  See \"Patient Info\" tab in inbasket, or \"Choose Columns\" in Meds & Orders section of the refill encounter.            Passed - Patient is age 10 or older        Passed - Patient's CR is NOT>1.4 OR Patient's EGFR is NOT<45 within past 12 mos.     Recent Labs   Lab Test 02/13/20  0800   GFRESTIMATED >90   GFRESTBLACK >90       Recent Labs   Lab Test 02/13/20  0800   CR 0.83             Passed - Patient does NOT have a diagnosis of CHF.        Passed - Medication is active on med list             "

## 2021-01-11 ENCOUNTER — TELEPHONE (OUTPATIENT)
Dept: FAMILY MEDICINE | Facility: CLINIC | Age: 51
End: 2021-01-11

## 2021-01-11 ENCOUNTER — AMBULATORY - HEALTHEAST (OUTPATIENT)
Dept: MULTI SPECIALTY CLINIC | Facility: CLINIC | Age: 51
End: 2021-01-11

## 2021-01-11 ENCOUNTER — TRANSFERRED RECORDS (OUTPATIENT)
Dept: HEALTH INFORMATION MANAGEMENT | Facility: CLINIC | Age: 51
End: 2021-01-11

## 2021-01-11 ENCOUNTER — RECORDS - HEALTHEAST (OUTPATIENT)
Dept: ADMINISTRATIVE | Facility: OTHER | Age: 51
End: 2021-01-11

## 2021-01-11 DIAGNOSIS — E11.9 TYPE 2 DIABETES MELLITUS WITHOUT COMPLICATION, WITHOUT LONG-TERM CURRENT USE OF INSULIN (H): ICD-10-CM

## 2021-01-11 LAB — RETINOPATHY: NORMAL

## 2021-01-12 NOTE — TELEPHONE ENCOUNTER
"  Requested Prescriptions   Pending Prescriptions Disp Refills     metFORMIN (GLUCOPHAGE) 1000 MG tablet [Pharmacy Med Name: METFORMIN 1000MG] 180 tablet 0     Sig: TAKE 1 TABLET BY MOUTH 2 TIMES DAILY WITH MEALS - DUE FOR CHECK IN CLINIC       Biguanide Agents Failed - 1/11/2021  9:00 AM        Failed - Patient has documented A1c within the specified period of time.     If HgbA1C is 8 or greater, it needs to be on file within the past 3 months.  If less than 8, must be on file within the past 6 months.     Recent Labs   Lab Test 02/13/20  0800   A1C 7.7*             Failed - Recent (6 mo) or future (30 days) visit within the authorizing provider's specialty     Patient had office visit in the last 6 months or has a visit in the next 30 days with authorizing provider or within the authorizing provider's specialty.  See \"Patient Info\" tab in inbasket, or \"Choose Columns\" in Meds & Orders section of the refill encounter.            Passed - Patient is age 10 or older        Passed - Patient's CR is NOT>1.4 OR Patient's EGFR is NOT<45 within past 12 mos.     Recent Labs   Lab Test 02/13/20  0800   GFRESTIMATED >90   GFRESTBLACK >90       Recent Labs   Lab Test 02/13/20  0800   CR 0.83             Passed - Patient does NOT have a diagnosis of CHF.        Passed - Medication is active on med list             "

## 2021-01-13 DIAGNOSIS — E11.9 TYPE 2 DIABETES MELLITUS WITHOUT COMPLICATION, WITHOUT LONG-TERM CURRENT USE OF INSULIN (H): ICD-10-CM

## 2021-01-14 NOTE — TELEPHONE ENCOUNTER
Pt has scheduled apt for next Wednesday 1/20. Please send in the Metformin. He ran out 2 days ago.

## 2021-01-20 ENCOUNTER — OFFICE VISIT (OUTPATIENT)
Dept: FAMILY MEDICINE | Facility: CLINIC | Age: 51
End: 2021-01-20
Payer: COMMERCIAL

## 2021-01-20 VITALS
DIASTOLIC BLOOD PRESSURE: 70 MMHG | TEMPERATURE: 98 F | WEIGHT: 255 LBS | RESPIRATION RATE: 20 BRPM | HEIGHT: 73 IN | HEART RATE: 82 BPM | BODY MASS INDEX: 33.8 KG/M2 | OXYGEN SATURATION: 99 % | SYSTOLIC BLOOD PRESSURE: 126 MMHG

## 2021-01-20 DIAGNOSIS — E78.5 HYPERLIPIDEMIA LDL GOAL <100: ICD-10-CM

## 2021-01-20 DIAGNOSIS — Z12.11 SPECIAL SCREENING FOR MALIGNANT NEOPLASMS, COLON: ICD-10-CM

## 2021-01-20 DIAGNOSIS — E11.9 TYPE 2 DIABETES MELLITUS WITHOUT COMPLICATION, WITHOUT LONG-TERM CURRENT USE OF INSULIN (H): ICD-10-CM

## 2021-01-20 DIAGNOSIS — N52.9 IMPOTENCE OF ORGANIC ORIGIN: ICD-10-CM

## 2021-01-20 DIAGNOSIS — I10 HYPERTENSION GOAL BP (BLOOD PRESSURE) < 140/90: Chronic | ICD-10-CM

## 2021-01-20 LAB
ALBUMIN SERPL-MCNC: 3.7 G/DL (ref 3.4–5)
ALP SERPL-CCNC: 49 U/L (ref 40–150)
ALT SERPL W P-5'-P-CCNC: 35 U/L (ref 0–70)
ANION GAP SERPL CALCULATED.3IONS-SCNC: 6 MMOL/L (ref 3–14)
AST SERPL W P-5'-P-CCNC: 15 U/L (ref 0–45)
BILIRUB SERPL-MCNC: 0.6 MG/DL (ref 0.2–1.3)
BUN SERPL-MCNC: 20 MG/DL (ref 7–30)
CALCIUM SERPL-MCNC: 9.1 MG/DL (ref 8.5–10.1)
CHLORIDE SERPL-SCNC: 106 MMOL/L (ref 94–109)
CHOLEST SERPL-MCNC: 155 MG/DL
CO2 SERPL-SCNC: 25 MMOL/L (ref 20–32)
CREAT SERPL-MCNC: 0.84 MG/DL (ref 0.66–1.25)
CREAT UR-MCNC: 156 MG/DL
GFR SERPL CREATININE-BSD FRML MDRD: >90 ML/MIN/{1.73_M2}
GLUCOSE SERPL-MCNC: 176 MG/DL (ref 70–99)
HBA1C MFR BLD: 7.8 % (ref 0–5.6)
HDLC SERPL-MCNC: 63 MG/DL
LDLC SERPL CALC-MCNC: 80 MG/DL
MICROALBUMIN UR-MCNC: 36 MG/L
MICROALBUMIN/CREAT UR: 22.88 MG/G CR (ref 0–17)
NONHDLC SERPL-MCNC: 92 MG/DL
POTASSIUM SERPL-SCNC: 4.4 MMOL/L (ref 3.4–5.3)
PROT SERPL-MCNC: 7.6 G/DL (ref 6.8–8.8)
SODIUM SERPL-SCNC: 137 MMOL/L (ref 133–144)
TRIGL SERPL-MCNC: 60 MG/DL

## 2021-01-20 PROCEDURE — 99207 PR FOOT EXAM NO CHARGE: CPT | Performed by: NURSE PRACTITIONER

## 2021-01-20 PROCEDURE — 82043 UR ALBUMIN QUANTITATIVE: CPT | Performed by: NURSE PRACTITIONER

## 2021-01-20 PROCEDURE — 80061 LIPID PANEL: CPT | Performed by: NURSE PRACTITIONER

## 2021-01-20 PROCEDURE — 99214 OFFICE O/P EST MOD 30 MIN: CPT | Performed by: NURSE PRACTITIONER

## 2021-01-20 PROCEDURE — 36415 COLL VENOUS BLD VENIPUNCTURE: CPT | Performed by: NURSE PRACTITIONER

## 2021-01-20 PROCEDURE — 80053 COMPREHEN METABOLIC PANEL: CPT | Performed by: NURSE PRACTITIONER

## 2021-01-20 PROCEDURE — 83036 HEMOGLOBIN GLYCOSYLATED A1C: CPT | Performed by: NURSE PRACTITIONER

## 2021-01-20 RX ORDER — SIMVASTATIN 10 MG
TABLET ORAL
Qty: 90 TABLET | Refills: 3 | Status: CANCELLED | OUTPATIENT
Start: 2021-01-20

## 2021-01-20 RX ORDER — GLIPIZIDE 2.5 MG/1
2.5 TABLET, EXTENDED RELEASE ORAL DAILY
Qty: 90 TABLET | Refills: 1 | Status: CANCELLED | OUTPATIENT
Start: 2021-01-20

## 2021-01-20 ASSESSMENT — MIFFLIN-ST. JEOR: SCORE: 2065.55

## 2021-01-20 NOTE — NURSING NOTE
"Chief Complaint   Patient presents with     Hypertension     Diabetes     Lipids       Initial /70   Pulse 82   Temp 98  F (36.7  C) (Tympanic)   Resp 20   Ht 1.854 m (6' 1\")   Wt 115.7 kg (255 lb)   SpO2 99%   BMI 33.64 kg/m   Estimated body mass index is 33.64 kg/m  as calculated from the following:    Height as of this encounter: 1.854 m (6' 1\").    Weight as of this encounter: 115.7 kg (255 lb).    Patient presents to the clinic using No DME    Health Maintenance that is potentially due pending provider review:  Colonoscopy/FIT    Pt will schedule colonoscopy appt.    Is there anyone who you would like to be able to receive your results? No  If yes have patient fill out MIGNON    "

## 2021-01-20 NOTE — PROGRESS NOTES
Assessment & Plan     Type 2 diabetes mellitus without complication, without long-term current use of insulin (H)  Hemoglobin A1C ordered and reviewed shows borderline control at 7.8, plan to increase the Glipizide to 5 mg daily.  Monitor for low blood sugars with increase.  Plan to recheck in 3-6 months following change.  - metFORMIN (GLUCOPHAGE) 1000 MG tablet; TAKE 1 TABLET BY MOUTH 2 TIMES DAILY WITH MEALS-  - FOOT EXAM  - Albumin Random Urine Quantitative with Creat Ratio  - Comprehensive metabolic panel  - Hemoglobin A1c  - glipiZIDE (GLUCOTROL XL) 5 MG 24 hr tablet; Take 1 tablet (5 mg) by mouth daily    Hypertension goal BP (blood pressure) < 140/90  Stable.   - lisinopril (ZESTRIL) 10 MG tablet; TAKE 1 TABLET BY MOUTH ONCE DAILY    Hyperlipidemia LDL goal <100  LDL cholesterol is not at goal with comorbid diabetes, plan to increase simvastatin to 20 mg  - Lipid panel reflex to direct LDL Fasting  - simvastatin (ZOCOR) 20 MG tablet; Take 1 tablet (20 mg) by mouth At Bedtime    Impotence of organic origin  Stable with current medication  - sildenafil (REVATIO) 20 MG tablet; TAKE 1 TO 3 TABLETS BY MOUTH DAILY AS NEEDED, 30MINS TO 4 HOURS BEFORE INTERCOURSE(DO NOT TAKE W/NITROGLYCERIN, TERAZOSIN, OR DOXAZOSIN)    Special screening for malignant neoplasms, colon    - GASTROENTEROLOGY ADULT REF PROCEDURE ONLY; Future    Return in about 3 months (around 4/20/2021) for Diabetes Check.    NIURKA Rosario [facility]    Juan rPasad is a 50 year old who presents to clinic today for the following health issues     HPI       Diabetes Follow-up    How often are you checking your blood sugar? A few times a week  What time of day are you checking your blood sugars (select all that apply)?  Random  Have you had any blood sugars above 200?  No  Have you had any blood sugars below 70?  No    What symptoms do you notice when your blood sugar is low?  Shaky, Dizzy and  Weak    What concerns do you have today about your diabetes? None     Do you have any of these symptoms? (Select all that apply)  No numbness or tingling in feet.  No redness, sores or blisters on feet.  No complaints of excessive thirst.  No reports of blurry vision.  No significant changes to weight.    Have you had a diabetic eye exam in the last 12 months? Yes- Date of last eye exam: 01/11/2021,  Location: [facility]    Hyperlipidemia Follow-Up      Are you regularly taking any medication or supplement to lower your cholesterol?   Yes- Simvastatin     Are you having muscle aches or other side effects that you think could be caused by your cholesterol lowering medication?  No    Hypertension Follow-up      Do you check your blood pressure regularly outside of the clinic? Yes     Are you following a low salt diet? No    Are your blood pressures ever more than 140 on the top number (systolic) OR more   than 90 on the bottom number (diastolic), for example 140/90? Yes     Sildenafil as needed for ED  No side effect with medication    BP Readings from Last 2 Encounters:   01/20/21 126/70   02/18/20 110/68     Hemoglobin A1C (%)   Date Value   01/20/2021 7.8 (H)   02/13/2020 7.7 (H)     LDL Cholesterol Calculated (mg/dL)   Date Value   01/20/2021 80   02/13/2020 90         How many servings of fruits and vegetables do you eat daily?  2-3    On average, how many sweetened beverages do you drink each day (Examples: soda, juice, sweet tea, etc.  Do NOT count diet or artificially sweetened beverages)?   0    How many days per week do you exercise enough to make your heart beat faster? 3 or less    How many minutes a day do you exercise enough to make your heart beat faster? 60 or more    How many days per week do you miss taking your medication? 0      Review of Systems   Constitutional, HEENT, cardiovascular, pulmonary, gi and gu systems are negative, except as otherwise noted.      Objective    /70   " Pulse 82   Temp 98  F (36.7  C) (Tympanic)   Resp 20   Ht 1.854 m (6' 1\")   Wt 115.7 kg (255 lb)   SpO2 99%   BMI 33.64 kg/m    Body mass index is 33.64 kg/m .  Physical Exam   GENERAL: healthy, alert and no distress  NECK: no adenopathy, no asymmetry, masses, or scars and thyroid normal to palpation  RESP: lungs clear to auscultation - no rales, rhonchi or wheezes  CV: regular rate and rhythm, normal S1 S2, no S3 or S4, no murmur, click or rub, no peripheral edema and peripheral pulses strong  ABDOMEN: soft, nontender, no hepatosplenomegaly, no masses and bowel sounds normal  NEURO: Normal strength and tone, mentation intact and speech normal  PSYCH: mentation appears normal, affect normal/bright  Diabetic foot exam: normal DP and PT pulses, no trophic changes or ulcerative lesions, normal sensory exam and normal monofilament exam    Results for orders placed or performed in visit on 01/20/21   Albumin Random Urine Quantitative with Creat Ratio     Status: Abnormal   Result Value Ref Range    Creatinine Urine 156 mg/dL    Albumin Urine mg/L 36 mg/L    Albumin Urine mg/g Cr 22.88 (H) 0 - 17 mg/g Cr   Comprehensive metabolic panel     Status: Abnormal   Result Value Ref Range    Sodium 137 133 - 144 mmol/L    Potassium 4.4 3.4 - 5.3 mmol/L    Chloride 106 94 - 109 mmol/L    Carbon Dioxide 25 20 - 32 mmol/L    Anion Gap 6 3 - 14 mmol/L    Glucose 176 (H) 70 - 99 mg/dL    Urea Nitrogen 20 7 - 30 mg/dL    Creatinine 0.84 0.66 - 1.25 mg/dL    GFR Estimate >90 >60 mL/min/[1.73_m2]    GFR Estimate If Black >90 >60 mL/min/[1.73_m2]    Calcium 9.1 8.5 - 10.1 mg/dL    Bilirubin Total 0.6 0.2 - 1.3 mg/dL    Albumin 3.7 3.4 - 5.0 g/dL    Protein Total 7.6 6.8 - 8.8 g/dL    Alkaline Phosphatase 49 40 - 150 U/L    ALT 35 0 - 70 U/L    AST 15 0 - 45 U/L   Hemoglobin A1c     Status: Abnormal   Result Value Ref Range    Hemoglobin A1C 7.8 (H) 0 - 5.6 %   Lipid panel reflex to direct LDL Fasting     Status: None   Result " Value Ref Range    Cholesterol 155 <200 mg/dL    Triglycerides 60 <150 mg/dL    HDL Cholesterol 63 >39 mg/dL    LDL Cholesterol Calculated 80 <100 mg/dL    Non HDL Cholesterol 92 <130 mg/dL

## 2021-01-21 ENCOUNTER — MYC MEDICAL ADVICE (OUTPATIENT)
Dept: FAMILY MEDICINE | Facility: CLINIC | Age: 51
End: 2021-01-21

## 2021-01-21 RX ORDER — LISINOPRIL 10 MG/1
TABLET ORAL
Qty: 90 TABLET | Refills: 3 | Status: SHIPPED | OUTPATIENT
Start: 2021-01-21 | End: 2022-01-20

## 2021-01-21 RX ORDER — GLIPIZIDE 5 MG/1
5 TABLET, FILM COATED, EXTENDED RELEASE ORAL DAILY
Qty: 90 TABLET | Refills: 1 | Status: SHIPPED | OUTPATIENT
Start: 2021-01-21 | End: 2021-07-28

## 2021-01-21 RX ORDER — SILDENAFIL CITRATE 20 MG/1
TABLET ORAL
Qty: 50 TABLET | Refills: 1 | Status: SHIPPED | OUTPATIENT
Start: 2021-01-21 | End: 2021-07-28

## 2021-01-21 RX ORDER — SIMVASTATIN 20 MG
20 TABLET ORAL AT BEDTIME
Qty: 90 TABLET | Refills: 3 | Status: SHIPPED | OUTPATIENT
Start: 2021-01-21 | End: 2022-01-20

## 2021-01-22 ENCOUNTER — TELEPHONE (OUTPATIENT)
Dept: FAMILY MEDICINE | Facility: CLINIC | Age: 51
End: 2021-01-22

## 2021-01-22 NOTE — TELEPHONE ENCOUNTER
Central Prior Authorization Team  Phone: [phone]    PA Initiation    Medication: sildenafil  Insurance Company: [employer] - Phone [phone] Fax [phone]  Pharmacy Filling the Rx: [id number] - [facility] [address]  Filling Pharmacy Phone: [phone]  Filling Pharmacy Fax:    Start Date: 1/22/2021

## 2021-01-22 NOTE — TELEPHONE ENCOUNTER
Prior Authorization Retail Medication Request    Medication/Dose: sildenafil  ICD code (if different than what is on RX):  Impotence of organic origin [N52.9]  Previously Tried and Failed:  n/a  Rationale:  ED possible side effect of HTN medications. Pt stable on rx    Insurance Name:  [id number]  Insurance ID:       Pharmacy Information (if different than what is on RX)  Name:  [facility]  Phone:

## 2021-01-25 NOTE — TELEPHONE ENCOUNTER
PRIOR AUTHORIZATION DENIED    Medication: sildenafil- DENIED     Denial Date: 1/25/2021    Denial Rational: Drugs used for the treatment of ED are excluded from coverage.      Appeal Information: N/A

## 2021-04-23 ENCOUNTER — MYC MEDICAL ADVICE (OUTPATIENT)
Dept: SLEEP MEDICINE | Facility: CLINIC | Age: 51
End: 2021-04-23
Payer: COMMERCIAL

## 2021-04-24 ASSESSMENT — ENCOUNTER SYMPTOMS
STIFFNESS: 1
MUSCLE WEAKNESS: 0
BACK PAIN: 0
MUSCLE CRAMPS: 0
HEMATURIA: 0
JOINT SWELLING: 0
NECK PAIN: 0
DYSURIA: 0
DIFFICULTY URINATING: 0
FLANK PAIN: 0
MYALGIAS: 0
ARTHRALGIAS: 1

## 2021-04-24 ASSESSMENT — SLEEP AND FATIGUE QUESTIONNAIRES
HOW LIKELY ARE YOU TO NOD OFF OR FALL ASLEEP WHEN YOU ARE A PASSENGER IN A CAR FOR AN HOUR WITHOUT A BREAK: WOULD NEVER DOZE
HOW LIKELY ARE YOU TO NOD OFF OR FALL ASLEEP IN A CAR, WHILE STOPPED FOR A FEW MINUTES IN TRAFFIC: WOULD NEVER DOZE
HOW LIKELY ARE YOU TO NOD OFF OR FALL ASLEEP WHILE LYING DOWN TO REST IN THE AFTERNOON WHEN CIRCUMSTANCES PERMIT: MODERATE CHANCE OF DOZING
HOW LIKELY ARE YOU TO NOD OFF OR FALL ASLEEP WHILE SITTING AND TALKING TO SOMEONE: WOULD NEVER DOZE
HOW LIKELY ARE YOU TO NOD OFF OR FALL ASLEEP WHILE SITTING QUIETLY AFTER LUNCH WITHOUT ALCOHOL: WOULD NEVER DOZE
HOW LIKELY ARE YOU TO NOD OFF OR FALL ASLEEP WHILE SITTING AND READING: WOULD NEVER DOZE
HOW LIKELY ARE YOU TO NOD OFF OR FALL ASLEEP WHILE WATCHING TV: WOULD NEVER DOZE
HOW LIKELY ARE YOU TO NOD OFF OR FALL ASLEEP WHILE SITTING INACTIVE IN A PUBLIC PLACE: WOULD NEVER DOZE

## 2021-04-26 ENCOUNTER — MYC MEDICAL ADVICE (OUTPATIENT)
Dept: SLEEP MEDICINE | Facility: CLINIC | Age: 51
End: 2021-04-26
Payer: COMMERCIAL

## 2021-04-26 ASSESSMENT — SLEEP AND FATIGUE QUESTIONNAIRES
HOW LIKELY ARE YOU TO NOD OFF OR FALL ASLEEP IN A CAR, WHILE STOPPED FOR A FEW MINUTES IN TRAFFIC: WOULD NEVER DOZE
HOW LIKELY ARE YOU TO NOD OFF OR FALL ASLEEP WHILE SITTING QUIETLY AFTER LUNCH WITHOUT ALCOHOL: WOULD NEVER DOZE
HOW LIKELY ARE YOU TO NOD OFF OR FALL ASLEEP WHILE LYING DOWN TO REST IN THE AFTERNOON WHEN CIRCUMSTANCES PERMIT: HIGH CHANCE OF DOZING
HOW LIKELY ARE YOU TO NOD OFF OR FALL ASLEEP WHILE SITTING AND TALKING TO SOMEONE: WOULD NEVER DOZE
HOW LIKELY ARE YOU TO NOD OFF OR FALL ASLEEP WHILE WATCHING TV: SLIGHT CHANCE OF DOZING
HOW LIKELY ARE YOU TO NOD OFF OR FALL ASLEEP WHILE SITTING AND READING: SLIGHT CHANCE OF DOZING
HOW LIKELY ARE YOU TO NOD OFF OR FALL ASLEEP WHILE SITTING INACTIVE IN A PUBLIC PLACE: SLIGHT CHANCE OF DOZING
HOW LIKELY ARE YOU TO NOD OFF OR FALL ASLEEP WHEN YOU ARE A PASSENGER IN A CAR FOR AN HOUR WITHOUT A BREAK: SLIGHT CHANCE OF DOZING

## 2021-04-27 ENCOUNTER — VIRTUAL VISIT (OUTPATIENT)
Dept: SLEEP MEDICINE | Facility: CLINIC | Age: 51
End: 2021-04-27
Payer: COMMERCIAL

## 2021-04-27 VITALS — HEIGHT: 73 IN | WEIGHT: 255 LBS | BODY MASS INDEX: 33.8 KG/M2

## 2021-04-27 DIAGNOSIS — G47.33 OSA (OBSTRUCTIVE SLEEP APNEA): Primary | ICD-10-CM

## 2021-04-27 PROCEDURE — 99214 OFFICE O/P EST MOD 30 MIN: CPT | Mod: TEL | Performed by: PHYSICIAN ASSISTANT

## 2021-04-27 ASSESSMENT — MIFFLIN-ST. JEOR: SCORE: 2065.55

## 2021-04-27 NOTE — PROGRESS NOTES
"Osmar Angel is a 50 year old male being evaluated via a billable telephone visit.     \"This telephone visit will be conducted via a call between you and your physician/provider. We have found that certain health care needs can be provided without the need for an in-person visit or physical exam.  This service lets us provide the care you need with a telephone conversation.  If a prescription is necessary we can send it directly to your pharmacy.  If lab work is needed we can place an order for that and you can then stop by our lab to have the test done at a later time.\"    Telephone visits are billed at different rates depending on your insurance coverage.  Please reach out to your insurance provider with any questions.    Patient has given verbal consent for a Telephone visit? Yes    What telephone number would you like your provider to contact at at:  [phone]     How would you like to obtain your AVS? Juan Miguel Dickinson [facility]    Telephone Visit Details:     Telephone Visit Start Time: 10:38AM    [facility]   Outpatient Sleep Medicine  Apr 27, 2021       Name: Osmar Angel MRN# [id number]   Age: 50 year old YOB: 1970            Assessment and Plan:   1. VERONA (obstructive sleep apnea)  Patient's sleep apnea is adequately managed and well treated with current PAP settings [id number] with low residual AHI of 3.6 events per hour. Compliance is good, uses essentially nightly basis though his nightly use is somewhat low at just over 4 hours. Meeting compliance measures with his use but discussed he would likely benefit more if using more and he understands this. Encouraged him to put mask back on after waking to use restroom in early morning to increase nightly use. Otherwise no changes will be made today. No indication to change pressure settings since low residual AHI and tolerating well. Daytime symptoms and nighttime sleep quality are improved. Prescription " renewed for mask/supplies today.     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     Osmar Angel will follow up in about 1 year for annual CPAP visit, or sooner as needed.        Chief Complaint      Chief Complaint   Patient presents with     Follow Up     F/U, DOT letter            History of Present Illness:     Osmar Angel is a 50 year old male who presents to the clinic for follow-up of their severe obstructive sleep apnea treated with CPAP. Other past medical history includes HTN, HLD, T2DM, obesity.     Osmar Angel presented in 2015 for evaluation of snoring (10 years), witnessed apnea (7 years) and daytime fatigue (2-3 years). He had a sleep study on 8/20/2015 at the [facility] for snoring, observed apnea, excessive daytime sleepiness (ESS 12), morning dry mouth, morning headaches, large neck circumference (50 cm) and co-morbid HTN, and DMII. Sleep study demonstrated severe obstructive sleep apnea with AHI of 71, lowest oxygen saturation was 72%. RDI 71. Periodic Limb Movement Index 0.0/hour.     August 26, 2015 Patient received a Tanya Respironics DreamStation Auto CPAP Auto. Pressures were set at Auto 11 - 15 cm H2O. Pressures subequently increased 10/2017 to autoCPAP 12-18cm due to elevated residual AHI and has been set to these pressures ever since.     Returns to clinic today for annual visit and due to need of download for upcoming DOT physical. No particular concerns or complaints today. Patient is using a full face mask with chin strap. The mask is comfortable. The mask is not leaking. They are not snoring with the mask on. They are not having gasp arousals.  They are not having significant oral/nasal dryness or epistaxis.  They are not having pain/skin breakdown. The pressure settings are comfortable.     Bedtime is typically sometime  "between 9-10:00PM and wake time is typically between 3-5:00AM pending work schedule. Patient is using PAP therapy 4 hours per night. The patient is usually getting 5-6 hours of sleep per night which does feel like enough for him. Reports that if he wakes in the early morning to use the restroom he may not put his mask back on since \"im getting up soon anyway\". Denies significant daytime sleepiness. [place] sleepiness scale normal today at 7. No daytime napping.     Respironics Auto-PAP 12-18 cmH2O download (2/24/21-4/22/21):  55/58 total days of use. 3 nonuse days.  Average use 4 hours 19 minutes per day.  60.3% days with >4 hours use.  Large leak 0 sec per day average. CPAP 90% pressure 15.0cm. AHI 3.6    SCALES:   INSOMNIA: Insomnia Severity Score: 7   SLEEPINESS: [place] Sleepiness Score: 7     Past medical/surgical history, family history, social history, medications and allergies were reviewed.           Physical Examination:   Ht 1.854 m (6' 1\")   Wt 115.7 kg (255 lb)   BMI 33.64 kg/m    General: Cooperative and pleasant. In no apparent distress.  Pulmonary:  Able to speak easily in full sentences. No cough or wheeze.   Neurologic: Alert, oriented x3.   Psychiatric: Mood euthymic. Affect congruent with full range and intensity.    CC:  Cinthya Johnson PA-C  Apr 27, 2021     [facility]  [address] [place] [address]     [facility]  [address] [address]    Chart documentation was completed, in part, with [employer] voice-recognition software. Even though reviewed, some grammatical, spelling, and word errors may remain.    Telephone Visit End Time:10:57 AM     38 minutes spent on day of encounter doing chart review, history and exam, documentation, and further activities as noted above                  "

## 2021-04-27 NOTE — PATIENT INSTRUCTIONS
Your BMI is Body mass index is 33.64 kg/m .  Weight management is a personal decision.  If you are interested in exploring weight loss strategies, the following discussion covers the approaches that may be successful. Body mass index (BMI) is one way to tell whether you are at a healthy weight, overweight, or obese. It measures your weight in relation to your height.  A BMI of 18.5 to 24.9 is in the healthy range. A person with a BMI of 25 to 29.9 is considered overweight, and someone with a BMI of 30 or greater is considered obese. More than two-thirds of American adults are considered overweight or obese.  Being overweight or obese increases the risk for further weight gain. Excess weight may lead to heart disease and diabetes.  Creating and following plans for healthy eating and physical activity may help you improve your health.  Weight control is part of healthy lifestyle and includes exercise, emotional health, and healthy eating habits. Careful eating habits lifelong are the mainstay of weight control. Though there are significant health benefits from weight loss, long-term weight loss with diet alone may be very difficult to achieve- studies show long-term success with dietary management in less than 10% of people. Attaining a healthy weight may be especially difficult to achieve in those with severe obesity. In some cases, medications, devices and surgical management might be considered.  What can you do?  If you are overweight or obese and are interested in methods for weight loss, you should discuss this with your provider.     Consider reducing daily calorie intake by 500 calories.     Keep a food journal.     Avoiding skipping meals, consider cutting portions instead.    Diet combined with exercise helps maintain muscle while optimizing fat loss. Strength training is particularly important for building and maintaining muscle mass. Exercise helps reduce stress, increase energy, and improves fitness.  Increasing exercise without diet control, however, may not burn enough calories to loose weight.       Start walking three days a week 10-20 minutes at a time    Work towards walking thirty minutes five days a week     Eventually, increase the speed of your walking for 1-2 minutes at time    In addition, we recommend that you review healthy lifestyles and methods for weight loss available through the National Institutes of Health patient information sites:  http://win.niddk.nih.gov/publications/index.htm    And look into health and wellness programs that may be available through your health insurance provider, employer, local community center, or deepika club.    Weight management plan: Patient was referred to their PCP to discuss a diet and exercise plan.

## 2021-05-03 DIAGNOSIS — E11.9 TYPE 2 DIABETES MELLITUS WITHOUT COMPLICATION, WITHOUT LONG-TERM CURRENT USE OF INSULIN (H): ICD-10-CM

## 2021-05-03 DIAGNOSIS — E78.5 HYPERLIPIDEMIA LDL GOAL <100: ICD-10-CM

## 2021-05-03 RX ORDER — SIMVASTATIN 20 MG
20 TABLET ORAL AT BEDTIME
Qty: 90 TABLET | Refills: 2 | Status: SHIPPED | OUTPATIENT
Start: 2021-05-03 | End: 2022-01-20

## 2021-05-17 NOTE — NURSING NOTE
Faxed info to [facility] at [phone]    Ruby Burciaga MA on 5/17/2021 at 1:45 PM

## 2021-06-13 ENCOUNTER — NURSE TRIAGE (OUTPATIENT)
Dept: NURSING | Facility: CLINIC | Age: 51
End: 2021-06-13

## 2021-06-13 NOTE — TELEPHONE ENCOUNTER
Got called out of town suddenly for a  and needs medication as he doesn't have enough to last.    Metformin 1000mg bid     Simvastatin 20 mg daily    Lisinopril 10mg daily    glipzide 5mg daily    [facility] [phone]    A weeks worth of medication called into the pharmacy called into the pharmacy for the patient.    Cherie Gee RN  [facility] Nurse Advisor  7:54 AM  2021    Reason for Disposition    Caller requesting a refill, no triage required, and triager able to refill per unit policy    Protocols used: MEDICATION QUESTION CALL-A-

## 2021-07-15 ENCOUNTER — MYC MEDICAL ADVICE (OUTPATIENT)
Dept: SLEEP MEDICINE | Facility: CLINIC | Age: 51
End: 2021-07-15
Payer: COMMERCIAL

## 2021-07-26 DIAGNOSIS — N52.9 IMPOTENCE OF ORGANIC ORIGIN: ICD-10-CM

## 2021-07-26 DIAGNOSIS — E11.9 TYPE 2 DIABETES MELLITUS WITHOUT COMPLICATION, WITHOUT LONG-TERM CURRENT USE OF INSULIN (H): ICD-10-CM

## 2021-07-28 RX ORDER — SILDENAFIL CITRATE 20 MG/1
TABLET ORAL
Qty: 50 TABLET | Refills: 0 | Status: SHIPPED | OUTPATIENT
Start: 2021-07-28 | End: 2021-10-18

## 2021-07-28 RX ORDER — GLIPIZIDE 5 MG/1
5 TABLET, FILM COATED, EXTENDED RELEASE ORAL DAILY
Qty: 90 TABLET | Refills: 0 | Status: SHIPPED | OUTPATIENT
Start: 2021-07-28 | End: 2021-10-27

## 2021-08-09 DIAGNOSIS — E11.9 TYPE 2 DIABETES MELLITUS WITHOUT COMPLICATION, WITHOUT LONG-TERM CURRENT USE OF INSULIN (H): ICD-10-CM

## 2021-08-27 DIAGNOSIS — E11.9 TYPE 2 DIABETES MELLITUS WITHOUT COMPLICATION, WITHOUT LONG-TERM CURRENT USE OF INSULIN (H): ICD-10-CM

## 2021-08-30 RX ORDER — LANCETS
EACH MISCELLANEOUS
Qty: 300 EACH | Refills: 0 | Status: SHIPPED | OUTPATIENT
Start: 2021-08-30

## 2021-09-16 ENCOUNTER — MYC MEDICAL ADVICE (OUTPATIENT)
Dept: FAMILY MEDICINE | Facility: CLINIC | Age: 51
End: 2021-09-16

## 2021-09-16 DIAGNOSIS — E11.9 TYPE 2 DIABETES MELLITUS WITHOUT COMPLICATION, WITHOUT LONG-TERM CURRENT USE OF INSULIN (H): Primary | ICD-10-CM

## 2021-09-17 ENCOUNTER — MYC MEDICAL ADVICE (OUTPATIENT)
Dept: FAMILY MEDICINE | Facility: CLINIC | Age: 51
End: 2021-09-17

## 2021-09-17 DIAGNOSIS — E11.9 TYPE 2 DIABETES MELLITUS WITHOUT COMPLICATION, WITHOUT LONG-TERM CURRENT USE OF INSULIN (H): ICD-10-CM

## 2021-09-28 ENCOUNTER — LAB (OUTPATIENT)
Dept: LAB | Facility: CLINIC | Age: 51
End: 2021-09-28
Payer: COMMERCIAL

## 2021-09-28 DIAGNOSIS — E11.9 TYPE 2 DIABETES MELLITUS WITHOUT COMPLICATION, WITHOUT LONG-TERM CURRENT USE OF INSULIN (H): ICD-10-CM

## 2021-09-28 LAB — HBA1C MFR BLD: 7.8 % (ref 0–5.6)

## 2021-09-28 PROCEDURE — 83036 HEMOGLOBIN GLYCOSYLATED A1C: CPT

## 2021-09-28 PROCEDURE — 36415 COLL VENOUS BLD VENIPUNCTURE: CPT

## 2021-10-18 DIAGNOSIS — N52.9 IMPOTENCE OF ORGANIC ORIGIN: ICD-10-CM

## 2021-10-18 RX ORDER — SILDENAFIL CITRATE 20 MG/1
TABLET ORAL
Qty: 50 TABLET | Refills: 1 | Status: SHIPPED | OUTPATIENT
Start: 2021-10-18 | End: 2022-01-24

## 2021-10-25 DIAGNOSIS — E11.9 TYPE 2 DIABETES MELLITUS WITHOUT COMPLICATION, WITHOUT LONG-TERM CURRENT USE OF INSULIN (H): ICD-10-CM

## 2021-10-27 RX ORDER — GLIPIZIDE 5 MG/1
TABLET, FILM COATED, EXTENDED RELEASE ORAL
Qty: 30 TABLET | Refills: 0 | Status: SHIPPED | OUTPATIENT
Start: 2021-10-27 | End: 2021-11-15

## 2021-11-01 ENCOUNTER — TRANSFERRED RECORDS (OUTPATIENT)
Dept: MULTI SPECIALTY CLINIC | Facility: CLINIC | Age: 51
End: 2021-11-01

## 2021-11-01 LAB — RETINOPATHY: NORMAL

## 2021-11-03 ENCOUNTER — MYC MEDICAL ADVICE (OUTPATIENT)
Dept: SLEEP MEDICINE | Facility: CLINIC | Age: 51
End: 2021-11-03
Payer: COMMERCIAL

## 2021-11-03 DIAGNOSIS — G47.33 OSA (OBSTRUCTIVE SLEEP APNEA): Primary | ICD-10-CM

## 2021-11-12 ENCOUNTER — MYC MEDICAL ADVICE (OUTPATIENT)
Dept: FAMILY MEDICINE | Facility: CLINIC | Age: 51
End: 2021-11-12
Payer: COMMERCIAL

## 2021-11-12 DIAGNOSIS — E11.9 TYPE 2 DIABETES MELLITUS WITHOUT COMPLICATION, WITHOUT LONG-TERM CURRENT USE OF INSULIN (H): ICD-10-CM

## 2021-11-15 RX ORDER — GLIPIZIDE 5 MG/1
TABLET, FILM COATED, EXTENDED RELEASE ORAL
Qty: 30 TABLET | Refills: 0 | Status: SHIPPED | OUTPATIENT
Start: 2021-11-15 | End: 2022-01-20

## 2021-12-20 ENCOUNTER — TELEPHONE (OUTPATIENT)
Dept: FAMILY MEDICINE | Facility: CLINIC | Age: 51
End: 2021-12-20
Payer: COMMERCIAL

## 2021-12-20 ENCOUNTER — MYC REFILL (OUTPATIENT)
Dept: FAMILY MEDICINE | Facility: CLINIC | Age: 51
End: 2021-12-20
Payer: COMMERCIAL

## 2021-12-20 DIAGNOSIS — E11.9 TYPE 2 DIABETES MELLITUS WITHOUT COMPLICATION, WITHOUT LONG-TERM CURRENT USE OF INSULIN (H): ICD-10-CM

## 2021-12-20 NOTE — TELEPHONE ENCOUNTER
Received call from patient requesting refill of Metformin to get him to his appointment with PCP scheduled for 1/4/22.  This writer will send 30 day supply to preferred pharmacy.  Karma Kiran RN

## 2022-01-03 ENCOUNTER — MYC MEDICAL ADVICE (OUTPATIENT)
Dept: FAMILY MEDICINE | Facility: CLINIC | Age: 52
End: 2022-01-03
Payer: COMMERCIAL

## 2022-01-16 DIAGNOSIS — E11.9 TYPE 2 DIABETES MELLITUS WITHOUT COMPLICATION, WITHOUT LONG-TERM CURRENT USE OF INSULIN (H): ICD-10-CM

## 2022-01-19 RX ORDER — GLIPIZIDE 5 MG/1
TABLET, FILM COATED, EXTENDED RELEASE ORAL
Qty: 30 TABLET | Refills: 0 | OUTPATIENT
Start: 2022-01-19

## 2022-01-19 NOTE — TELEPHONE ENCOUNTER
Routing refill request to provider for review/approval because:  Patient needs to be seen because:  Due for diabetes check    Melecio Austin RN

## 2022-01-20 ENCOUNTER — OFFICE VISIT (OUTPATIENT)
Dept: FAMILY MEDICINE | Facility: CLINIC | Age: 52
End: 2022-01-20
Payer: COMMERCIAL

## 2022-01-20 VITALS
BODY MASS INDEX: 32.74 KG/M2 | SYSTOLIC BLOOD PRESSURE: 110 MMHG | TEMPERATURE: 98.5 F | WEIGHT: 247 LBS | HEIGHT: 73 IN | DIASTOLIC BLOOD PRESSURE: 64 MMHG | HEART RATE: 74 BPM | RESPIRATION RATE: 16 BRPM

## 2022-01-20 DIAGNOSIS — I10 HYPERTENSION GOAL BP (BLOOD PRESSURE) < 140/90: Chronic | ICD-10-CM

## 2022-01-20 DIAGNOSIS — N52.9 IMPOTENCE OF ORGANIC ORIGIN: ICD-10-CM

## 2022-01-20 DIAGNOSIS — E11.9 TYPE 2 DIABETES MELLITUS WITHOUT COMPLICATION, WITHOUT LONG-TERM CURRENT USE OF INSULIN (H): Primary | ICD-10-CM

## 2022-01-20 DIAGNOSIS — Z12.11 SCREEN FOR COLON CANCER: ICD-10-CM

## 2022-01-20 DIAGNOSIS — E78.5 HYPERLIPIDEMIA LDL GOAL <100: ICD-10-CM

## 2022-01-20 LAB
ALBUMIN SERPL-MCNC: 3.5 G/DL (ref 3.4–5)
ALP SERPL-CCNC: 49 U/L (ref 40–150)
ALT SERPL W P-5'-P-CCNC: 54 U/L (ref 0–70)
ANION GAP SERPL CALCULATED.3IONS-SCNC: 4 MMOL/L (ref 3–14)
AST SERPL W P-5'-P-CCNC: 23 U/L (ref 0–45)
BILIRUB SERPL-MCNC: 0.6 MG/DL (ref 0.2–1.3)
BUN SERPL-MCNC: 15 MG/DL (ref 7–30)
CALCIUM SERPL-MCNC: 9.2 MG/DL (ref 8.5–10.1)
CHLORIDE BLD-SCNC: 105 MMOL/L (ref 94–109)
CHOLEST SERPL-MCNC: 125 MG/DL
CO2 SERPL-SCNC: 29 MMOL/L (ref 20–32)
CREAT SERPL-MCNC: 0.76 MG/DL (ref 0.66–1.25)
CREAT UR-MCNC: 135 MG/DL
FASTING STATUS PATIENT QL REPORTED: NO
GFR SERPL CREATININE-BSD FRML MDRD: >90 ML/MIN/1.73M2
GLUCOSE BLD-MCNC: 203 MG/DL (ref 70–99)
HBA1C MFR BLD: 8.4 % (ref 0–5.6)
HDLC SERPL-MCNC: 47 MG/DL
LDLC SERPL CALC-MCNC: 67 MG/DL
MICROALBUMIN UR-MCNC: 26 MG/L
MICROALBUMIN/CREAT UR: 19.26 MG/G CR (ref 0–17)
NONHDLC SERPL-MCNC: 78 MG/DL
POTASSIUM BLD-SCNC: 4.3 MMOL/L (ref 3.4–5.3)
PROT SERPL-MCNC: 7.3 G/DL (ref 6.8–8.8)
SODIUM SERPL-SCNC: 138 MMOL/L (ref 133–144)
TRIGL SERPL-MCNC: 55 MG/DL

## 2022-01-20 PROCEDURE — 36415 COLL VENOUS BLD VENIPUNCTURE: CPT | Performed by: NURSE PRACTITIONER

## 2022-01-20 PROCEDURE — 99214 OFFICE O/P EST MOD 30 MIN: CPT | Performed by: NURSE PRACTITIONER

## 2022-01-20 PROCEDURE — 82043 UR ALBUMIN QUANTITATIVE: CPT | Performed by: NURSE PRACTITIONER

## 2022-01-20 PROCEDURE — 80061 LIPID PANEL: CPT | Performed by: NURSE PRACTITIONER

## 2022-01-20 PROCEDURE — 83036 HEMOGLOBIN GLYCOSYLATED A1C: CPT | Performed by: NURSE PRACTITIONER

## 2022-01-20 PROCEDURE — 99207 PR FOOT EXAM NO CHARGE: CPT | Performed by: NURSE PRACTITIONER

## 2022-01-20 PROCEDURE — 80053 COMPREHEN METABOLIC PANEL: CPT | Performed by: NURSE PRACTITIONER

## 2022-01-20 RX ORDER — LISINOPRIL 10 MG/1
TABLET ORAL
Qty: 90 TABLET | Refills: 3 | Status: SHIPPED | OUTPATIENT
Start: 2022-01-20 | End: 2022-01-24

## 2022-01-20 RX ORDER — SIMVASTATIN 20 MG
20 TABLET ORAL AT BEDTIME
Qty: 90 TABLET | Refills: 3 | Status: SHIPPED | OUTPATIENT
Start: 2022-01-20 | End: 2022-02-28

## 2022-01-20 RX ORDER — GLIPIZIDE 5 MG/1
TABLET, FILM COATED, EXTENDED RELEASE ORAL
Qty: 90 TABLET | Refills: 0 | Status: SHIPPED | OUTPATIENT
Start: 2022-01-20 | End: 2022-02-14

## 2022-01-20 RX ORDER — DULAGLUTIDE 0.75 MG/.5ML
0.75 INJECTION, SOLUTION SUBCUTANEOUS
Qty: 2 ML | Refills: 0 | Status: SHIPPED | OUTPATIENT
Start: 2022-01-20 | End: 2022-02-14

## 2022-01-20 ASSESSMENT — MIFFLIN-ST. JEOR: SCORE: 2024.26

## 2022-01-20 NOTE — PATIENT INSTRUCTIONS
Add the Trulicity once weekly -let me know how things are going in 3-4 weeks and we will increase at that time.    Recheck in clinic in 3 months

## 2022-01-20 NOTE — PROGRESS NOTES
Assessment & Plan     Type 2 diabetes mellitus without complication, without long-term current use of insulin (H)  Not controlled.  Plan to add Trulicity for better control of diabetes.  Osmar will let me know how things are going in 1 month and we will increase at that time.  Ultimately will plan on discontinuing the glipizide once blood sugars trending down with the Trulicity.  Monitor blood sugars closely with adding the Trulicity.   - glipiZIDE (GLUCOTROL XL) 5 MG 24 hr tablet; TAKE 1 TABLET BY MOUTH DAILY  - metFORMIN (GLUCOPHAGE) 1000 MG tablet; TAKE 1 TABLET BY MOUTH 2 TIMES DAILY WITH MEALS  - OR FOOT EXAM NO CHARGE  - Comprehensive metabolic panel; Future  - Albumin Random Urine Quantitative with Creat Ratio; Future  - Hemoglobin A1c; Future  - Lipid panel reflex to direct LDL Fasting; Future  - Lipid panel reflex to direct LDL Fasting  - Hemoglobin A1c  - Albumin Random Urine Quantitative with Creat Ratio  - Comprehensive metabolic panel  - dulaglutide (TRULICITY) 0.75 MG/0.5ML pen; Inject 0.75 mg Subcutaneous every 7 days    Hypertension goal BP (blood pressure) < 140/90  Stable with current medication.   - lisinopril (ZESTRIL) 10 MG tablet; TAKE 1 TABLET BY MOUTH ONCE DAILY  - Comprehensive metabolic panel; Future  - Comprehensive metabolic panel    Hyperlipidemia LDL goal <100  Stable with current medication.   - simvastatin (ZOCOR) 20 MG tablet; Take 1 tablet (20 mg) by mouth At Bedtime  - Lipid panel reflex to direct LDL Fasting; Future  - Lipid panel reflex to direct LDL Fasting    Screen for colon cancer  Due for screening  - Adult Gastro Ref - Procedure Only; Future      Return in about 4 weeks (around 2/17/2022) for diabetes update.    NIURKA Rosario [facility]    Juan Prasad is a 51 year old who presents for the following health issues     HPI     Diabetes Follow-up      How often are you checking your blood sugar? Not at all since having COVID  "    What concerns do you have today about your diabetes? None     Do you have any of these symptoms? (Select all that apply)  Weight loss 10 lbs. States this if when he had COVID     Have you had a diabetic eye exam in the last 12 months? Yes- Date of last eye exam: Nov ,  [facility] [place], normal    Hyperlipidemia Follow-Up      Are you regularly taking any medication or supplement to lower your cholesterol?   Yes- simvastatin     Are you having muscle aches or other side effects that you think could be caused by your cholesterol lowering medication?  No    Hypertension Follow-up      Do you check your blood pressure regularly outside of the clinic? No     Are you following a low salt diet? No    Are your blood pressures ever more than 140 on the top number (systolic) OR more   than 90 on the bottom number (diastolic), for example 140/90? No    BP Readings from Last 2 Encounters:   01/20/22 110/64   01/20/21 126/70     Hemoglobin A1C POCT (%)   Date Value   01/20/2021 7.8 (H)   02/13/2020 7.7 (H)     Hemoglobin A1C (%)   Date Value   01/20/2022 8.4 (H)   09/28/2021 7.8 (H)     LDL Cholesterol Calculated (mg/dL)   Date Value   01/20/2022 67   01/20/2021 80   02/13/2020 90       Review of Systems   Constitutional, HEENT, cardiovascular, pulmonary, gi and gu systems are negative, except as otherwise noted.      Objective    /64 (Cuff Size: Adult Large)   Pulse 74   Temp 98.5  F (36.9  C) (Tympanic)   Resp 16   Ht 1.854 m (6' 1\")   Wt 112 kg (247 lb)   BMI 32.59 kg/m    Body mass index is 32.59 kg/m .  Physical Exam   GENERAL: healthy, alert and no distress  NECK: no adenopathy, no asymmetry, masses, or scars and thyroid normal to palpation  RESP: lungs clear to auscultation - no rales, rhonchi or wheezes  CV: regular rate and rhythm, normal S1 S2, no S3 or S4, no murmur, click or rub, no peripheral edema and peripheral pulses strong  ABDOMEN: soft, nontender  PSYCH: mentation " appears normal, affect normal/bright  Diabetic foot exam: normal DP and PT pulses, no trophic changes or ulcerative lesions, normal sensory exam and normal monofilament exam    Results for orders placed or performed in visit on 01/20/22   Lipid panel reflex to direct LDL Fasting     Status: None   Result Value Ref Range    Cholesterol 125 <200 mg/dL    Triglycerides 55 <150 mg/dL    Direct Measure HDL 47 >=40 mg/dL    LDL Cholesterol Calculated 67 <=100 mg/dL    Non HDL Cholesterol 78 <130 mg/dL    Patient Fasting > 8hrs? No     Narrative    Cholesterol  Desirable:  <200 mg/dL    Triglycerides  Normal:  Less than 150 mg/dL  Borderline High:  150-199 mg/dL  High:  200-499 mg/dL  Very High:  Greater than or equal to 500 mg/dL    Direct Measure HDL  Female:  Greater than or equal to 50 mg/dL   Male:  Greater than or equal to 40 mg/dL    LDL Cholesterol  Desirable:  <100mg/dL  Above Desirable:  100-129 mg/dL   Borderline High:  130-159 mg/dL   High:  160-189 mg/dL   Very High:  >= 190 mg/dL    Non HDL Cholesterol  Desirable:  130 mg/dL  Above Desirable:  130-159 mg/dL  Borderline High:  160-189 mg/dL  High:  190-219 mg/dL  Very High:  Greater than or equal to 220 mg/dL   Hemoglobin A1c     Status: Abnormal   Result Value Ref Range    Hemoglobin A1C 8.4 (H) 0.0 - 5.6 %   Albumin Random Urine Quantitative with Creat Ratio     Status: Abnormal   Result Value Ref Range    Creatinine Urine mg/dL 135 mg/dL    Albumin Urine mg/L 26 mg/L    Albumin Urine mg/g Cr 19.26 (H) 0.00 - 17.00 mg/g Cr   Comprehensive metabolic panel     Status: Abnormal   Result Value Ref Range    Sodium 138 133 - 144 mmol/L    Potassium 4.3 3.4 - 5.3 mmol/L    Chloride 105 94 - 109 mmol/L    Carbon Dioxide (CO2) 29 20 - 32 mmol/L    Anion Gap 4 3 - 14 mmol/L    Urea Nitrogen 15 7 - 30 mg/dL    Creatinine 0.76 0.66 - 1.25 mg/dL    Calcium 9.2 8.5 - 10.1 mg/dL    Glucose 203 (H) 70 - 99 mg/dL    Alkaline Phosphatase 49 40 - 150 U/L    AST 23 0 - 45 U/L     ALT 54 0 - 70 U/L    Protein Total 7.3 6.8 - 8.8 g/dL    Albumin 3.5 3.4 - 5.0 g/dL    Bilirubin Total 0.6 0.2 - 1.3 mg/dL    GFR Estimate >90 >60 mL/min/1.73m2

## 2022-01-21 ENCOUNTER — MYC MEDICAL ADVICE (OUTPATIENT)
Dept: FAMILY MEDICINE | Facility: CLINIC | Age: 52
End: 2022-01-21
Payer: COMMERCIAL

## 2022-01-24 RX ORDER — SILDENAFIL CITRATE 20 MG/1
TABLET ORAL
Qty: 50 TABLET | Refills: 3 | Status: SHIPPED | OUTPATIENT
Start: 2022-01-24 | End: 2022-05-02

## 2022-01-24 RX ORDER — LISINOPRIL 10 MG/1
TABLET ORAL
Qty: 90 TABLET | Refills: 1 | Status: SHIPPED | OUTPATIENT
Start: 2022-01-24 | End: 2022-07-27

## 2022-01-25 NOTE — TELEPHONE ENCOUNTER
Patient had an appointment on 1/20/22 and had all of his medications refilled then.    Lia Alex MA

## 2022-02-10 ENCOUNTER — MYC MEDICAL ADVICE (OUTPATIENT)
Dept: FAMILY MEDICINE | Facility: CLINIC | Age: 52
End: 2022-02-10
Payer: COMMERCIAL

## 2022-02-10 DIAGNOSIS — E11.9 TYPE 2 DIABETES MELLITUS WITHOUT COMPLICATION, WITHOUT LONG-TERM CURRENT USE OF INSULIN (H): ICD-10-CM

## 2022-02-10 RX ORDER — BLOOD SUGAR DIAGNOSTIC
STRIP MISCELLANEOUS
Qty: 300 STRIP | Refills: 0 | Status: SHIPPED | OUTPATIENT
Start: 2022-02-10

## 2022-02-13 ENCOUNTER — MYC REFILL (OUTPATIENT)
Dept: FAMILY MEDICINE | Facility: CLINIC | Age: 52
End: 2022-02-13
Payer: COMMERCIAL

## 2022-02-13 ENCOUNTER — MYC MEDICAL ADVICE (OUTPATIENT)
Dept: FAMILY MEDICINE | Facility: CLINIC | Age: 52
End: 2022-02-13
Payer: COMMERCIAL

## 2022-02-13 DIAGNOSIS — E11.9 TYPE 2 DIABETES MELLITUS WITHOUT COMPLICATION, WITHOUT LONG-TERM CURRENT USE OF INSULIN (H): Primary | ICD-10-CM

## 2022-02-13 DIAGNOSIS — E11.9 TYPE 2 DIABETES MELLITUS WITHOUT COMPLICATION, WITHOUT LONG-TERM CURRENT USE OF INSULIN (H): ICD-10-CM

## 2022-02-14 RX ORDER — DULAGLUTIDE 0.75 MG/.5ML
0.75 INJECTION, SOLUTION SUBCUTANEOUS
Qty: 2 ML | Refills: 0 | Status: SHIPPED | OUTPATIENT
Start: 2022-02-14 | End: 2022-05-23 | Stop reason: DRUGHIGH

## 2022-02-14 RX ORDER — DULAGLUTIDE 1.5 MG/.5ML
1.5 INJECTION, SOLUTION SUBCUTANEOUS
Qty: 2 ML | Refills: 2 | Status: SHIPPED | OUTPATIENT
Start: 2022-02-14 | End: 2022-05-23

## 2022-02-27 DIAGNOSIS — E78.5 HYPERLIPIDEMIA LDL GOAL <100: ICD-10-CM

## 2022-02-28 RX ORDER — SIMVASTATIN 20 MG
TABLET ORAL
Qty: 90 TABLET | Refills: 0 | Status: SHIPPED | OUTPATIENT
Start: 2022-02-28 | End: 2022-07-27

## 2022-03-11 ENCOUNTER — VIRTUAL VISIT (OUTPATIENT)
Dept: SLEEP MEDICINE | Facility: CLINIC | Age: 52
End: 2022-03-11
Payer: COMMERCIAL

## 2022-03-11 VITALS — HEIGHT: 73 IN | WEIGHT: 250 LBS | BODY MASS INDEX: 33.13 KG/M2

## 2022-03-11 DIAGNOSIS — G47.33 OSA (OBSTRUCTIVE SLEEP APNEA): Primary | ICD-10-CM

## 2022-03-11 PROCEDURE — 99213 OFFICE O/P EST LOW 20 MIN: CPT | Mod: 95 | Performed by: INTERNAL MEDICINE

## 2022-03-11 ASSESSMENT — SLEEP AND FATIGUE QUESTIONNAIRES
HOW LIKELY ARE YOU TO NOD OFF OR FALL ASLEEP WHILE SITTING AND TALKING TO SOMEONE: WOULD NEVER DOZE
HOW LIKELY ARE YOU TO NOD OFF OR FALL ASLEEP WHILE LYING DOWN TO REST IN THE AFTERNOON WHEN CIRCUMSTANCES PERMIT: HIGH CHANCE OF DOZING
HOW LIKELY ARE YOU TO NOD OFF OR FALL ASLEEP WHILE WATCHING TV: SLIGHT CHANCE OF DOZING
HOW LIKELY ARE YOU TO NOD OFF OR FALL ASLEEP WHILE SITTING QUIETLY AFTER LUNCH WITHOUT ALCOHOL: SLIGHT CHANCE OF DOZING
HOW LIKELY ARE YOU TO NOD OFF OR FALL ASLEEP WHILE SITTING INACTIVE IN A PUBLIC PLACE: WOULD NEVER DOZE
HOW LIKELY ARE YOU TO NOD OFF OR FALL ASLEEP IN A CAR, WHILE STOPPED FOR A FEW MINUTES IN TRAFFIC: WOULD NEVER DOZE
HOW LIKELY ARE YOU TO NOD OFF OR FALL ASLEEP WHEN YOU ARE A PASSENGER IN A CAR FOR AN HOUR WITHOUT A BREAK: WOULD NEVER DOZE
HOW LIKELY ARE YOU TO NOD OFF OR FALL ASLEEP WHILE SITTING AND READING: WOULD NEVER DOZE

## 2022-03-11 ASSESSMENT — PAIN SCALES - GENERAL: PAINLEVEL: NO PAIN (0)

## 2022-03-11 NOTE — PROGRESS NOTES
Osmar is a 51 year old who is being evaluated via a billable video visit.      How would you like to obtain your AVS? Mail a copy  If the video visit is dropped, the invitation should be resent by: Text to cell phone: [phone]  Will anyone else be joining your video visit? No       Jennifer Mae    Video Start Time: 3:25 PM  Video-Visit Details    Type of service:  Video Visit    Video End Time:3:41 PM    Originating Location (pt. 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        Osmar Angel comes in today for follow-up of their severe sleep apnea, managed with CPAP.     PSG on 8/20/2015 at weight of 269 lbs showed an AHI of 71 per hour.     Overall, he rates the experience with PAP as good. The mask is comfortable. The mask is not leaking.  He is not snoring with the mask on. He is not having gasp arousals.  He is not having significant oral/nasal dryness. The pressure settings are comfortable.     He uses full-face mask.     Bedtime is typically 8:30 - 9PM. Usually it takes about 10 minutes to fall asleep with the mask on. Wake time is typically 2-3 AM.  Patient is using PAP therapy 6 hours per night. The patient is usually getting 6 hours of sleep per night.    He does feel rested in the morning.    Total score - [place]: 5 (3/11/2022  3:15 PM)      Respironics (Dreamstation 2)    Auto-PAP 12-18 cmH2O download:  21/30 total days of use. 9 nonuse days.  Average use 4 hours 16 minutes per day.  50% days with >4 hours use.  Large leak 0 secs per day average. CPAP 90% pressure 15.9cm. AHI 6.1 (CA-0.5, OA-1.8, HI- 3.8)        Reviewed by team:  Tobacco  Allergies  Meds            Reviewed by provider:  "                Problem List:  Patient Active Problem List    Diagnosis Date Noted     VERONA (obstructive sleep apnea)-AHI 71 08/26/2015     Priority: Medium     Severe VERONA: 8/20/2015 (AHI 71, RDI 71, rebel 72%)       Obesity, Class II, BMI 35-39.9, with comorbidity 08/12/2015     Priority: Medium     Type 2 diabetes mellitus without complications (H) 06/04/2012     Priority: Medium     Diagnosed 2011       Hyperlipidemia LDL goal <100 06/04/2012     Priority: Medium     Hypertension goal BP (blood pressure) < 140/90 01/15/2010     Priority: Medium     External hemorrhoids 10/27/2008     Priority: Medium     Pain in joint, upper arm 06/04/2007     Priority: Medium     Profound impairment, one eye, impairment level not further specified 06/07/2005     Priority: Medium     left eye loss of direct vision            Ht 1.854 m (6' 1\")   Wt 113.4 kg (250 lb)   BMI 32.98 kg/m      Impression/Plan:     Severe sleep apnea.     -  Tolerating PAP well. Daytime symptoms are stable..     -Sleep apnea is adequately treated at current therapy settings.  He has been given a replacement DreamStation 2 device from [employer].    -Patient works with the [employer] for [employer].  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  Patient was counseled on importance of regular adherence to therapy.     -Download data from his device shows satisfactory residual AHI on current therapy settings.    Plan:     1. Continue auto PAP therapy     Osmar Angel will follow up in about 1 year(s).     I spent a total of 20 minutes for this appointment on this date of service which include time spent before, during and after the visit for chart review, patient care, counseling and coordination of care.    Joe Booth MD    CC:  Cinthya Johnson,   "

## 2022-03-11 NOTE — PATIENT INSTRUCTIONS
Your Body mass index is 32.98 kg/m .  Weight management is a personal decision.  If you are interested in exploring weight loss strategies, the following discussion covers the approaches that may be successful. Body mass index (BMI) is one way to tell whether you are at a healthy weight, overweight, or obese. It measures your weight in relation to your height.  A BMI of 18.5 to 24.9 is in the healthy range. A person with a BMI of 25 to 29.9 is considered overweight, and someone with a BMI of 30 or greater is considered obese. More than two-thirds of American adults are considered overweight or obese.  Being overweight or obese increases the risk for further weight gain. Excess weight may lead to heart disease and diabetes.  Creating and following plans for healthy eating and physical activity may help you improve your health.  Weight control is part of healthy lifestyle and includes exercise, emotional health, and healthy eating habits. Careful eating habits lifelong are the mainstay of weight control. Though there are significant health benefits from weight loss, long-term weight loss with diet alone may be very difficult to achieve- studies show long-term success with dietary management in less than 10% of people. Attaining a healthy weight may be especially difficult to achieve in those with severe obesity. In some cases, medications, devices and surgical management might be considered.  What can you do?  If you are overweight or obese and are interested in methods for weight loss, you should discuss this with your provider.     Consider reducing daily calorie intake by 500 calories.     Keep a food journal.     Avoiding skipping meals, consider cutting portions instead.    Diet combined with exercise helps maintain muscle while optimizing fat loss. Strength training is particularly important for building and maintaining muscle mass. Exercise helps reduce stress, increase energy, and improves fitness.  Increasing exercise without diet control, however, may not burn enough calories to loose weight.       Start walking three days a week 10-20 minutes at a time    Work towards walking thirty minutes five days a week     Eventually, increase the speed of your walking for 1-2 minutes at time    In addition, we recommend that you review healthy lifestyles and methods for weight loss available through the National Institutes of Health patient information sites:  http://win.niddk.nih.gov/publications/index.htm    And look into health and wellness programs that may be available through your health insurance provider, employer, local community center, or deepika club.

## 2022-04-24 ENCOUNTER — HEALTH MAINTENANCE LETTER (OUTPATIENT)
Age: 52
End: 2022-04-24

## 2022-04-27 ENCOUNTER — TELEPHONE (OUTPATIENT)
Dept: FAMILY MEDICINE | Facility: CLINIC | Age: 52
End: 2022-04-27
Payer: COMMERCIAL

## 2022-04-27 NOTE — TELEPHONE ENCOUNTER
Patient Quality Outreach    Patient is due for the following:   Diabetes -  A1C    NEXT STEPS:   Schedule a office visit for DM check     Type of outreach:    Phone, left message for patient/parent to call back.      Questions for provider review:    None     Elham Pollack, CMA

## 2022-04-29 ENCOUNTER — TELEPHONE (OUTPATIENT)
Dept: FAMILY MEDICINE | Facility: CLINIC | Age: 52
End: 2022-04-29
Payer: COMMERCIAL

## 2022-04-29 DIAGNOSIS — N52.9 IMPOTENCE OF ORGANIC ORIGIN: Primary | ICD-10-CM

## 2022-04-29 NOTE — TELEPHONE ENCOUNTER
Insurance only covers 100mg sildenafil, pharmacist requesting new order.    Dang Ramon, ALEISHA Blakely

## 2022-05-02 DIAGNOSIS — N52.9 IMPOTENCE OF ORGANIC ORIGIN: Primary | ICD-10-CM

## 2022-05-02 RX ORDER — SILDENAFIL 100 MG/1
50-100 TABLET, FILM COATED ORAL DAILY PRN
Qty: 20 TABLET | Refills: 1 | Status: SHIPPED | OUTPATIENT
Start: 2022-05-02 | End: 2022-07-27

## 2022-05-04 RX ORDER — SILDENAFIL CITRATE 20 MG/1
TABLET ORAL
Qty: 50 TABLET | Refills: 0 | Status: SHIPPED | OUTPATIENT
Start: 2022-05-04 | End: 2022-06-23

## 2022-05-11 ENCOUNTER — MYC MEDICAL ADVICE (OUTPATIENT)
Dept: FAMILY MEDICINE | Facility: CLINIC | Age: 52
End: 2022-05-11
Payer: COMMERCIAL

## 2022-05-23 DIAGNOSIS — E11.9 TYPE 2 DIABETES MELLITUS WITHOUT COMPLICATION, WITHOUT LONG-TERM CURRENT USE OF INSULIN (H): ICD-10-CM

## 2022-05-23 RX ORDER — DULAGLUTIDE 1.5 MG/.5ML
1.5 INJECTION, SOLUTION SUBCUTANEOUS
Qty: 2 ML | Refills: 0 | Status: SHIPPED | OUTPATIENT
Start: 2022-05-23 | End: 2022-06-13

## 2022-05-23 NOTE — TELEPHONE ENCOUNTER
Unable to lock in the order for Pt's Trulicity.  Pt is requesting to have this filled today as he is out of this medication.  Please Advise.

## 2022-06-13 RX ORDER — DULAGLUTIDE 1.5 MG/.5ML
1.5 INJECTION, SOLUTION SUBCUTANEOUS
Qty: 2 ML | Refills: 0 | Status: SHIPPED | OUTPATIENT
Start: 2022-06-13 | End: 2022-07-12

## 2022-06-13 NOTE — TELEPHONE ENCOUNTER
Pt's pharmacy calling in regards to this medication. Pt is looking to get this refilled. Wondering if it can be refilled/get an update on it.    [phone]  [facility] [address]    Maira Schroeder Patient

## 2022-06-23 DIAGNOSIS — N52.9 IMPOTENCE OF ORGANIC ORIGIN: ICD-10-CM

## 2022-06-23 RX ORDER — SILDENAFIL CITRATE 20 MG/1
TABLET ORAL
Qty: 50 TABLET | Refills: 0 | Status: SHIPPED | OUTPATIENT
Start: 2022-06-23 | End: 2022-07-27

## 2022-07-10 ENCOUNTER — MYC MEDICAL ADVICE (OUTPATIENT)
Dept: FAMILY MEDICINE | Facility: CLINIC | Age: 52
End: 2022-07-10

## 2022-07-10 DIAGNOSIS — E11.9 TYPE 2 DIABETES MELLITUS WITHOUT COMPLICATION, WITHOUT LONG-TERM CURRENT USE OF INSULIN (H): ICD-10-CM

## 2022-07-12 RX ORDER — DULAGLUTIDE 1.5 MG/.5ML
1.5 INJECTION, SOLUTION SUBCUTANEOUS
Qty: 2 ML | Refills: 0 | Status: SHIPPED | OUTPATIENT
Start: 2022-07-12 | End: 2022-07-27

## 2022-07-27 ENCOUNTER — OFFICE VISIT (OUTPATIENT)
Dept: FAMILY MEDICINE | Facility: CLINIC | Age: 52
End: 2022-07-27
Payer: COMMERCIAL

## 2022-07-27 VITALS
DIASTOLIC BLOOD PRESSURE: 78 MMHG | TEMPERATURE: 99.4 F | RESPIRATION RATE: 16 BRPM | SYSTOLIC BLOOD PRESSURE: 132 MMHG | HEIGHT: 73 IN | WEIGHT: 250 LBS | BODY MASS INDEX: 33.13 KG/M2 | HEART RATE: 108 BPM

## 2022-07-27 DIAGNOSIS — N52.9 IMPOTENCE OF ORGANIC ORIGIN: ICD-10-CM

## 2022-07-27 DIAGNOSIS — E78.5 HYPERLIPIDEMIA LDL GOAL <100: ICD-10-CM

## 2022-07-27 DIAGNOSIS — E11.65 TYPE 2 DIABETES MELLITUS WITH HYPERGLYCEMIA, WITHOUT LONG-TERM CURRENT USE OF INSULIN (H): ICD-10-CM

## 2022-07-27 DIAGNOSIS — I10 HYPERTENSION GOAL BP (BLOOD PRESSURE) < 140/90: Chronic | ICD-10-CM

## 2022-07-27 LAB — HBA1C MFR BLD: 7.9 % (ref 0–5.6)

## 2022-07-27 PROCEDURE — 83036 HEMOGLOBIN GLYCOSYLATED A1C: CPT | Performed by: NURSE PRACTITIONER

## 2022-07-27 PROCEDURE — 99214 OFFICE O/P EST MOD 30 MIN: CPT | Performed by: NURSE PRACTITIONER

## 2022-07-27 PROCEDURE — 36415 COLL VENOUS BLD VENIPUNCTURE: CPT | Performed by: NURSE PRACTITIONER

## 2022-07-27 RX ORDER — DULAGLUTIDE 1.5 MG/.5ML
1.5 INJECTION, SOLUTION SUBCUTANEOUS
Qty: 2 ML | Refills: 0 | Status: CANCELLED | OUTPATIENT
Start: 2022-07-27

## 2022-07-27 RX ORDER — LISINOPRIL 10 MG/1
10 TABLET ORAL DAILY
Qty: 90 TABLET | Refills: 3 | Status: SHIPPED | OUTPATIENT
Start: 2022-07-27 | End: 2023-05-03

## 2022-07-27 RX ORDER — SIMVASTATIN 20 MG
20 TABLET ORAL AT BEDTIME
Qty: 90 TABLET | Refills: 1 | Status: SHIPPED | OUTPATIENT
Start: 2022-07-27 | End: 2023-02-09

## 2022-07-27 RX ORDER — SILDENAFIL CITRATE 20 MG/1
TABLET ORAL
Qty: 50 TABLET | Refills: 1 | Status: SHIPPED | OUTPATIENT
Start: 2022-07-27 | End: 2022-10-24

## 2022-07-27 NOTE — PROGRESS NOTES
Assessment & Plan     Type 2 diabetes mellitus with hyperglycemia, without long-term current use of insulin (H)  Hemoglobin A1C down slightly to 7.9, plan to increase the Trulicity to 3 mg and recheck in 6 months.   - metFORMIN (GLUCOPHAGE) 1000 MG tablet; TAKE 1 TABLET BY MOUTH 2 TIMES DAILY WITH MEALS  - HEMOGLOBIN A1C; Future  - HEMOGLOBIN A1C  - Dulaglutide 3 MG/0.5ML SOPN; Inject 3 mg Subcutaneous every 7 days    Hypertension goal BP (blood pressure) < 140/90  Stable. Refills sent to the pharmacy.   - lisinopril (ZESTRIL) 10 MG tablet; Take 1 tablet (10 mg) by mouth daily    Hyperlipidemia LDL goal <100  Stable. Refills sent to the pharmacy.   - simvastatin (ZOCOR) 20 MG tablet; Take 1 tablet (20 mg) by mouth At Bedtime    Impotence of organic origin  Refills sent to the pharmacy.   - sildenafil (REVATIO) 20 MG tablet; TAKE 1 TO 3 TABLETS BY MOUTH 30 MINUTES TO 4 HOURS BEFORE INTERCOURSE AS NEEDED      Return in about 6 months (around 1/27/2023) for Diabetes Check.    NIURKA Rosario [facility]    Juan Thrasher is a 52 year old, presenting for the following health issues:  Diabetes      History of Present Illness       Diabetes:   He presents for follow up of diabetes.  He is checking home blood glucose a few times a week. He checks blood glucose before meals.  Blood glucose is never over 200 and never under 70. When his blood glucose is low, the patient is asymptomatic for confusion, blurred vision, lethargy and reports not feeling dizzy, shaky, or weak.  He is concerned about other.  He is not experiencing numbness or burning in feet, excessive thirst, blurry vision, weight changes or redness, sores or blisters on feet. The patient has had a diabetic eye exam in the last 12 months. Eye exam performed on November 2021. Location of last eye exam [facility] [place], [place].        He eats 2-3 servings of fruits and vegetables daily.He consumes 0 sweetened beverage(s)  "daily.He exercises with enough effort to increase his heart rate 20 to 29 minutes per day.  He exercises with enough effort to increase his heart rate 5 days per week.   He is taking medications regularly.       Hyperlipidemia Follow-Up      Are you regularly taking any medication or supplement to lower your cholesterol?   Yes- simvastatin     Are you having muscle aches or other side effects that you think could be caused by your cholesterol lowering medication?  No    Hypertension Follow-up      Do you check your blood pressure regularly outside of the clinic? No     Are you following a low salt diet? No    Are your blood pressures ever more than 140 on the top number (systolic) OR more   than 90 on the bottom number (diastolic), for example 140/90? No      Review of Systems   Constitutional, HEENT, cardiovascular, pulmonary, gi and gu systems are negative, except as otherwise noted.      Objective    /78 (Cuff Size: Adult Large)   Pulse 108   Temp 99.4  F (37.4  C) (Tympanic)   Resp 16   Ht 1.854 m (6' 1\")   Wt 113.4 kg (250 lb)   BMI 32.98 kg/m    Body mass index is 32.98 kg/m .  Physical Exam   GENERAL: healthy, alert and no distress  NECK: no adenopathy, no asymmetry, masses, or scars and thyroid normal to palpation  RESP: lungs clear to auscultation - no rales, rhonchi or wheezes  CV: regular rate and rhythm, normal S1 S2, no S3 or S4, no murmur  PSYCH: mentation appears normal, affect normal/bright    Results for orders placed or performed in visit on 07/27/22   HEMOGLOBIN A1C     Status: Abnormal   Result Value Ref Range    Hemoglobin A1C 7.9 (H) 0.0 - 5.6 %               .  ..  "

## 2022-10-22 DIAGNOSIS — N52.9 IMPOTENCE OF ORGANIC ORIGIN: ICD-10-CM

## 2022-10-24 RX ORDER — SILDENAFIL CITRATE 20 MG/1
TABLET ORAL
Qty: 50 TABLET | Refills: 1 | Status: SHIPPED | OUTPATIENT
Start: 2022-10-24 | End: 2023-05-03

## 2022-11-19 ENCOUNTER — HEALTH MAINTENANCE LETTER (OUTPATIENT)
Age: 52
End: 2022-11-19

## 2023-02-01 ENCOUNTER — TRANSFERRED RECORDS (OUTPATIENT)
Dept: MULTI SPECIALTY CLINIC | Facility: CLINIC | Age: 53
End: 2023-02-01

## 2023-02-01 LAB — RETINOPATHY: NORMAL

## 2023-03-22 NOTE — TELEPHONE ENCOUNTER
"Requested Prescriptions   Pending Prescriptions Disp Refills     lisinopril (PRINIVIL/ZESTRIL) 10 MG tablet [Pharmacy Med Name: LISINOPRIL 10 MG    TAB SOL] 90 tablet 0     Sig: TAKE ONE TABLET BY MOUTH ONE TIME DAILY    ACE Inhibitors (Including Combos) Protocol Passed    10/15/2018  8:07 AM       Passed - Blood pressure under 140/90 in past 12 months    BP Readings from Last 3 Encounters:   06/27/18 112/79   10/11/17 127/85   08/30/17 139/86                Passed - Recent (12 mo) or future (30 days) visit within the authorizing provider's specialty    Patient had office visit in the last 12 months or has a visit in the next 30 days with authorizing provider or within the authorizing provider's specialty.  See \"Patient Info\" tab in inbasket, or \"Choose Columns\" in Meds & Orders section of the refill encounter.           Passed - Patient is age 18 or older       Passed - Normal serum creatinine on file in past 12 months    Recent Labs   Lab Test  02/26/18   1003   CR  0.95            Passed - Normal serum potassium on file in past 12 months    Recent Labs   Lab Test  02/26/18   1003   POTASSIUM  4.2             Last Written Prescription Date:  4/17/18  Last Fill Quantity: 90,  # refills: 1   Last office visit: 3/27/2018 with prescribing provider:  Chevy   Future Office Visit:      " No

## 2023-04-09 ENCOUNTER — HEALTH MAINTENANCE LETTER (OUTPATIENT)
Age: 53
End: 2023-04-09

## 2023-04-11 ENCOUNTER — TELEPHONE (OUTPATIENT)
Dept: FAMILY MEDICINE | Facility: CLINIC | Age: 53
End: 2023-04-11
Payer: COMMERCIAL

## 2023-04-11 NOTE — TELEPHONE ENCOUNTER
Attempt to call pt to inform may check with pharm for med, also to remind due for DM appt.  Pt not at number listed.  SHAWNA Muniz RN

## 2023-04-11 NOTE — TELEPHONE ENCOUNTER
PA request was sent via [employer] for the Trulicity 3mg.    his request has been approved using information available on the patient's profile. CaseId:[id number];Status:Approved;Review Type:Prior Auth;Coverage Start Date:03/12/2023;Coverage End Date:04/10/2024;    Pharmacy notified via fax and suggested to call Price when rx is ready for .    Gina López RT(R)  [facility]  X-Ray [phone]

## 2023-04-17 ENCOUNTER — TELEPHONE (OUTPATIENT)
Dept: FAMILY MEDICINE | Facility: CLINIC | Age: 53
End: 2023-04-17
Payer: COMMERCIAL

## 2023-04-17 DIAGNOSIS — E11.65 TYPE 2 DIABETES MELLITUS WITH HYPERGLYCEMIA, WITHOUT LONG-TERM CURRENT USE OF INSULIN (H): ICD-10-CM

## 2023-04-17 NOTE — TELEPHONE ENCOUNTER
Last diabetic check was 7/28/22 and was due for recheck in January 2023    Hemoglobin A1C   Date Value Ref Range Status   07/27/2022 7.9 (H) 0.0 - 5.6 % Final     Comment:     Normal <5.7%   Prediabetes 5.7-6.4%    Diabetes 6.5% or higher     Note: Adopted from ADA consensus guidelines.   01/20/2021 7.8 (H) 0 - 5.6 % Final     Comment:     Normal <5.7% Prediabetes 5.7-6.4%  Diabetes 6.5% or higher - adopted from ADA   consensus guidelines.       I talked with Price and told him due for diabetic check up and transferred him to schedule an appointment.  I told him I would I would refill one month  Ivette Rodney RN

## 2023-05-02 DIAGNOSIS — E11.65 TYPE 2 DIABETES MELLITUS WITH HYPERGLYCEMIA, WITHOUT LONG-TERM CURRENT USE OF INSULIN (H): ICD-10-CM

## 2023-05-02 RX ORDER — DULAGLUTIDE 3 MG/.5ML
INJECTION, SOLUTION SUBCUTANEOUS
Qty: 6 ML | Refills: 0 | Status: SHIPPED | OUTPATIENT
Start: 2023-05-02 | End: 2023-05-03

## 2023-05-03 ENCOUNTER — OFFICE VISIT (OUTPATIENT)
Dept: FAMILY MEDICINE | Facility: CLINIC | Age: 53
End: 2023-05-03
Payer: COMMERCIAL

## 2023-05-03 VITALS
HEART RATE: 90 BPM | SYSTOLIC BLOOD PRESSURE: 110 MMHG | OXYGEN SATURATION: 99 % | RESPIRATION RATE: 16 BRPM | BODY MASS INDEX: 32.64 KG/M2 | WEIGHT: 241 LBS | TEMPERATURE: 97.5 F | HEIGHT: 72 IN | DIASTOLIC BLOOD PRESSURE: 62 MMHG

## 2023-05-03 DIAGNOSIS — N52.9 IMPOTENCE OF ORGANIC ORIGIN: ICD-10-CM

## 2023-05-03 DIAGNOSIS — Z12.11 SCREEN FOR COLON CANCER: ICD-10-CM

## 2023-05-03 DIAGNOSIS — Z11.4 SCREENING FOR HIV (HUMAN IMMUNODEFICIENCY VIRUS): ICD-10-CM

## 2023-05-03 DIAGNOSIS — Z11.59 NEED FOR HEPATITIS C SCREENING TEST: ICD-10-CM

## 2023-05-03 DIAGNOSIS — E11.65 TYPE 2 DIABETES MELLITUS WITH HYPERGLYCEMIA, WITHOUT LONG-TERM CURRENT USE OF INSULIN (H): Primary | ICD-10-CM

## 2023-05-03 DIAGNOSIS — E78.5 HYPERLIPIDEMIA LDL GOAL <100: ICD-10-CM

## 2023-05-03 DIAGNOSIS — I10 HYPERTENSION GOAL BP (BLOOD PRESSURE) < 140/90: ICD-10-CM

## 2023-05-03 LAB
ALBUMIN SERPL BCG-MCNC: 4.5 G/DL (ref 3.5–5.2)
ALP SERPL-CCNC: 44 U/L (ref 40–129)
ALT SERPL W P-5'-P-CCNC: 28 U/L (ref 10–50)
ANION GAP SERPL CALCULATED.3IONS-SCNC: 10 MMOL/L (ref 7–15)
AST SERPL W P-5'-P-CCNC: 23 U/L (ref 10–50)
BILIRUB SERPL-MCNC: 0.5 MG/DL
BUN SERPL-MCNC: 17.9 MG/DL (ref 6–20)
CALCIUM SERPL-MCNC: 9.8 MG/DL (ref 8.6–10)
CHLORIDE SERPL-SCNC: 103 MMOL/L (ref 98–107)
CHOLEST SERPL-MCNC: 142 MG/DL
CREAT SERPL-MCNC: 0.98 MG/DL (ref 0.67–1.17)
CREAT UR-MCNC: 208 MG/DL
DEPRECATED HCO3 PLAS-SCNC: 27 MMOL/L (ref 22–29)
GFR SERPL CREATININE-BSD FRML MDRD: >90 ML/MIN/1.73M2
GLUCOSE SERPL-MCNC: 183 MG/DL (ref 70–99)
HBA1C MFR BLD: 7.5 % (ref 0–5.6)
HDLC SERPL-MCNC: 53 MG/DL
HOLD SPECIMEN: NORMAL
LDLC SERPL CALC-MCNC: 79 MG/DL
MICROALBUMIN UR-MCNC: 40.7 MG/L
MICROALBUMIN/CREAT UR: 19.57 MG/G CR (ref 0–17)
NONHDLC SERPL-MCNC: 89 MG/DL
POTASSIUM SERPL-SCNC: 5.3 MMOL/L (ref 3.4–5.3)
PROT SERPL-MCNC: 7.3 G/DL (ref 6.4–8.3)
SODIUM SERPL-SCNC: 140 MMOL/L (ref 136–145)
TRIGL SERPL-MCNC: 50 MG/DL
VIT B12 SERPL-MCNC: 536 PG/ML (ref 232–1245)

## 2023-05-03 PROCEDURE — 86803 HEPATITIS C AB TEST: CPT | Performed by: NURSE PRACTITIONER

## 2023-05-03 PROCEDURE — 83036 HEMOGLOBIN GLYCOSYLATED A1C: CPT | Performed by: NURSE PRACTITIONER

## 2023-05-03 PROCEDURE — 82570 ASSAY OF URINE CREATININE: CPT | Performed by: NURSE PRACTITIONER

## 2023-05-03 PROCEDURE — 82043 UR ALBUMIN QUANTITATIVE: CPT | Performed by: NURSE PRACTITIONER

## 2023-05-03 PROCEDURE — 36415 COLL VENOUS BLD VENIPUNCTURE: CPT | Performed by: NURSE PRACTITIONER

## 2023-05-03 PROCEDURE — 99214 OFFICE O/P EST MOD 30 MIN: CPT | Performed by: NURSE PRACTITIONER

## 2023-05-03 PROCEDURE — 80061 LIPID PANEL: CPT | Performed by: NURSE PRACTITIONER

## 2023-05-03 PROCEDURE — 87389 HIV-1 AG W/HIV-1&-2 AB AG IA: CPT | Performed by: NURSE PRACTITIONER

## 2023-05-03 PROCEDURE — 82607 VITAMIN B-12: CPT | Performed by: NURSE PRACTITIONER

## 2023-05-03 PROCEDURE — 80053 COMPREHEN METABOLIC PANEL: CPT | Performed by: NURSE PRACTITIONER

## 2023-05-03 RX ORDER — SILDENAFIL CITRATE 20 MG/1
TABLET ORAL
Qty: 50 TABLET | Refills: 1 | Status: SHIPPED | OUTPATIENT
Start: 2023-05-03 | End: 2024-03-29

## 2023-05-03 RX ORDER — LISINOPRIL 10 MG/1
10 TABLET ORAL DAILY
Qty: 90 TABLET | Refills: 3 | Status: SHIPPED | OUTPATIENT
Start: 2023-05-03 | End: 2024-05-06

## 2023-05-03 RX ORDER — DULAGLUTIDE 3 MG/.5ML
INJECTION, SOLUTION SUBCUTANEOUS
Qty: 6 ML | Status: CANCELLED | OUTPATIENT
Start: 2023-05-03

## 2023-05-03 RX ORDER — SIMVASTATIN 20 MG
20 TABLET ORAL AT BEDTIME
Qty: 90 TABLET | Refills: 3 | Status: SHIPPED | OUTPATIENT
Start: 2023-05-03 | End: 2024-05-06

## 2023-05-03 ASSESSMENT — PAIN SCALES - GENERAL: PAINLEVEL: NO PAIN (0)

## 2023-05-03 NOTE — PROGRESS NOTES
Assessment & Plan     Type 2 diabetes mellitus with hyperglycemia, without long-term current use of insulin (H)  Improved but not at goal, plan to increase the Trulicity to 4.5 mg every 7 days to continue to bring hemoglobin A1c down.  - Albumin Random Urine Quantitative with Creat Ratio; Future  - HEMOGLOBIN A1C; Future  - EYE EXAM (SIMPLE-NONBILLABLE)  - metFORMIN (GLUCOPHAGE) 1000 MG tablet; Take 1 tablet (1,000 mg) by mouth 2 times daily (with meals)  - HEMOGLOBIN A1C  - Albumin Random Urine Quantitative with Creat Ratio  - Dulaglutide 4.5 MG/0.5ML SOPN; Inject 4.5 mg Subcutaneous every 7 days  - Vitamin B12; Future  - Vitamin B12    Hypertension goal BP (blood pressure) < 140/90  Stable with current medication  - Comprehensive metabolic panel (BMP + Alb, Alk Phos, ALT, AST, Total. Bili, TP); Future  - lisinopril (ZESTRIL) 10 MG tablet; Take 1 tablet (10 mg) by mouth daily  - Comprehensive metabolic panel (BMP + Alb, Alk Phos, ALT, AST, Total. Bili, TP)    Hyperlipidemia LDL goal <100  Annual labs pending  - Lipid panel reflex to direct LDL Non-fasting; Future  - Comprehensive metabolic panel (BMP + Alb, Alk Phos, ALT, AST, Total. Bili, TP); Future  - simvastatin (ZOCOR) 20 MG tablet; Take 1 tablet (20 mg) by mouth At Bedtime  - Comprehensive metabolic panel (BMP + Alb, Alk Phos, ALT, AST, Total. Bili, TP)  - Lipid panel reflex to direct LDL Non-fasting    Impotence of organic origin  Stable with as needed medication  - sildenafil (REVATIO) 20 MG tablet; TAKE 1 TO 3 TABLETS BY MOUTH 30 MINUTES TO 4 HOURS BEFORE INTERCOURSE AS NEEDED    Screen for colon cancer    - Colonoscopy Screening  Referral; Future    Screening for HIV (human immunodeficiency virus)    - HIV Antigen Antibody Combo; Future  - HIV Antigen Antibody Combo    Need for hepatitis C screening test    - Hepatitis C Screen Reflex to HCV RNA Quant and Genotype; Future  - Hepatitis C Screen Reflex to HCV RNA Quant and  [id number] [address] faxed Prior Authorization for Transderm - Scop patches QTY - 4  Plan does not cover - please call [phone] .  Patient id# [id number]    Fax in purple folder Genotype          NIURKA Rosario CNP [facility]    Juan Thrasher is a 52 year old, presenting for the following health issues:  Diabetes, Hypertension, and Lipids        5/3/2023     8:12 AM   Additional Questions   Roomed by Tania OROURKE CMA   Accompanied by Self     History of Present Illness       Diabetes:   He presents for follow up of diabetes.  He is checking home blood glucose a few times a month. He checks blood glucose before meals.  Blood glucose is never over 200 and never under 70. When his blood glucose is low, the patient is asymptomatic for confusion, blurred vision, lethargy and reports not feeling dizzy, shaky, or weak.  He has no concerns regarding his diabetes at this time.  He is not experiencing numbness or burning in feet, excessive thirst, blurry vision, weight changes or redness, sores or blisters on feet. The patient has had a diabetic eye exam in the last 12 months. Eye exam performed on feb 2023. Location of last eye exam [facility].        He eats 2-3 servings of fruits and vegetables daily.He consumes 3 sweetened beverage(s) daily.He exercises with enough effort to increase his heart rate 10 to 19 minutes per day.  He exercises with enough effort to increase his heart rate 5 days per week.   He is taking medications regularly.       Hyperlipidemia Follow-Up      Are you regularly taking any medication or supplement to lower your cholesterol?   Yes- simvastatin     Are you having muscle aches or other side effects that you think could be caused by your cholesterol lowering medication?  No    Hypertension Follow-up      Do you check your blood pressure regularly outside of the clinic? No     Are you following a low salt diet? No    Are your blood pressures ever more than 140 on the top number (systolic) OR more   than 90 on the bottom number (diastolic), for example 140/90? NA      Review of Systems   Constitutional, HEENT, cardiovascular, pulmonary,  "gi and gu systems are negative, except as otherwise noted.      Objective    /62 (Cuff Size: Adult Large)   Pulse 90   Temp 97.5  F (36.4  C) (Tympanic)   Resp 16   Ht 1.835 m (6' 0.25\")   Wt 109.3 kg (241 lb)   SpO2 99%   BMI 32.46 kg/m    Body mass index is 32.46 kg/m .  Physical Exam   GENERAL: healthy, alert and no distress  NECK: no adenopathy, no asymmetry, masses, or scars and thyroid normal to palpation  RESP: lungs clear to auscultation - no rales, rhonchi or wheezes  CV: regular rate and rhythm, normal S1 S2, no S3 or S4, no murmur  ABDOMEN: soft, nontender  PSYCH: mentation appears normal, affect normal/bright  Diabetic foot exam: normal DP and PT pulses, no trophic changes or ulcerative lesions, normal sensory exam and normal monofilament exam    Results for orders placed or performed in visit on 05/03/23   EYE EXAM (SIMPLE-NONBILLABLE)     Status: None    Narrative    [facility]    HEMOGLOBIN A1C     Status: Abnormal   Result Value Ref Range    Hemoglobin A1C 7.5 (H) 0.0 - 5.6 %   Extra Tube     Status: None (In process)    Narrative    The following orders were created for panel order Extra Tube.  Procedure                               Abnormality         Status                     ---------                               -----------         ------                     Extra Serum Separator Tu...[[id number]]                      In process                   Please view results for these tests on the individual orders.                 "

## 2023-05-04 LAB
HCV AB SERPL QL IA: NONREACTIVE
HIV 1+2 AB+HIV1 P24 AG SERPL QL IA: NONREACTIVE

## 2023-05-18 NOTE — TELEPHONE ENCOUNTER
Prescription approved per [facility] Refill Protocol.  
Previous Labs: No
How Did The Hair Loss Occur?: gradual in onset
How Severe Is Your Hair Loss?: moderate

## 2023-05-30 ENCOUNTER — MYC MEDICAL ADVICE (OUTPATIENT)
Dept: FAMILY MEDICINE | Facility: CLINIC | Age: 53
End: 2023-05-30
Payer: COMMERCIAL

## 2023-05-30 DIAGNOSIS — E11.65 TYPE 2 DIABETES MELLITUS WITH HYPERGLYCEMIA, WITHOUT LONG-TERM CURRENT USE OF INSULIN (H): ICD-10-CM

## 2023-06-01 ENCOUNTER — HEALTH MAINTENANCE LETTER (OUTPATIENT)
Age: 53
End: 2023-06-01

## 2023-06-28 ENCOUNTER — TELEPHONE (OUTPATIENT)
Dept: SLEEP MEDICINE | Facility: CLINIC | Age: 53
End: 2023-06-28
Payer: COMMERCIAL

## 2023-06-28 NOTE — TELEPHONE ENCOUNTER
Checked Care  and patient received a replacement Dreamstation 2 from the [employer] recall. Called patient if he still want a replacement CPAP through insurance and he stated the one from [employer] work just fine.

## 2023-07-11 DIAGNOSIS — G47.33 OBSTRUCTIVE SLEEP APNEA (ADULT) (PEDIATRIC): Primary | ICD-10-CM

## 2023-11-19 ENCOUNTER — HEALTH MAINTENANCE LETTER (OUTPATIENT)
Age: 53
End: 2023-11-19

## 2024-01-07 DIAGNOSIS — E11.65 TYPE 2 DIABETES MELLITUS WITH HYPERGLYCEMIA, WITHOUT LONG-TERM CURRENT USE OF INSULIN (H): ICD-10-CM

## 2024-01-08 RX ORDER — DULAGLUTIDE 4.5 MG/.5ML
4.5 INJECTION, SOLUTION SUBCUTANEOUS
Qty: 6 ML | Refills: 0 | Status: SHIPPED | OUTPATIENT
Start: 2024-01-08 | End: 2024-03-10

## 2024-01-16 ENCOUNTER — MYC MEDICAL ADVICE (OUTPATIENT)
Dept: FAMILY MEDICINE | Facility: CLINIC | Age: 54
End: 2024-01-16
Payer: COMMERCIAL

## 2024-01-17 ENCOUNTER — E-VISIT (OUTPATIENT)
Dept: URGENT CARE | Facility: CLINIC | Age: 54
End: 2024-01-17
Payer: COMMERCIAL

## 2024-01-17 DIAGNOSIS — J01.90 ACUTE BACTERIAL SINUSITIS: Primary | ICD-10-CM

## 2024-01-17 DIAGNOSIS — B96.89 ACUTE BACTERIAL SINUSITIS: Primary | ICD-10-CM

## 2024-01-17 PROCEDURE — 99421 OL DIG E/M SVC 5-10 MIN: CPT | Performed by: PHYSICIAN ASSISTANT

## 2024-02-01 ENCOUNTER — TRANSFERRED RECORDS (OUTPATIENT)
Dept: MULTI SPECIALTY CLINIC | Facility: CLINIC | Age: 54
End: 2024-02-01

## 2024-02-01 LAB — RETINOPATHY: NORMAL

## 2024-02-13 ENCOUNTER — TELEPHONE (OUTPATIENT)
Dept: FAMILY MEDICINE | Facility: CLINIC | Age: 54
End: 2024-02-13
Payer: COMMERCIAL

## 2024-02-13 NOTE — TELEPHONE ENCOUNTER
Patient Quality Outreach    Patient is due for the following:   Diabetes -  A1C  Colon Cancer Screening  Physical Annual Wellness Visit    Next Steps:   Schedule a Adult Preventative    Type of outreach:    Sent Stormpulse message.      Questions for provider review:    None           Iliana Rapp CMA

## 2024-03-10 ENCOUNTER — MYC REFILL (OUTPATIENT)
Dept: FAMILY MEDICINE | Facility: CLINIC | Age: 54
End: 2024-03-10
Payer: COMMERCIAL

## 2024-03-10 DIAGNOSIS — E11.65 TYPE 2 DIABETES MELLITUS WITH HYPERGLYCEMIA, WITHOUT LONG-TERM CURRENT USE OF INSULIN (H): ICD-10-CM

## 2024-03-11 RX ORDER — DULAGLUTIDE 4.5 MG/.5ML
4.5 INJECTION, SOLUTION SUBCUTANEOUS
Qty: 6 ML | Refills: 0 | Status: SHIPPED | OUTPATIENT
Start: 2024-03-11 | End: 2024-05-20

## 2024-03-29 DIAGNOSIS — N52.9 IMPOTENCE OF ORGANIC ORIGIN: ICD-10-CM

## 2024-03-29 RX ORDER — SILDENAFIL CITRATE 20 MG/1
TABLET ORAL
Qty: 50 TABLET | Refills: 0 | Status: SHIPPED | OUTPATIENT
Start: 2024-03-29 | End: 2024-05-20

## 2024-03-29 NOTE — TELEPHONE ENCOUNTER
Prescription approved per [facility] Refill Protocol     Mariam Bailey     RN MSN

## 2024-04-18 ENCOUNTER — TELEPHONE (OUTPATIENT)
Dept: FAMILY MEDICINE | Facility: CLINIC | Age: 54
End: 2024-04-18
Payer: COMMERCIAL

## 2024-04-18 NOTE — TELEPHONE ENCOUNTER
Patient Quality Outreach    Patient is due for the following:   Diabetes -  A1C, Eye Exam, and Diabetic Follow-Up Visit  Physical Preventive Adult Physical    Next Steps:   Schedule a Adult Preventative and diabetes eye and exam    Type of outreach:    Phone, left message for patient/parent to call back.      Questions for provider review:    None           Debbi Cheema, [facility]

## 2024-05-04 ENCOUNTER — MYC MEDICAL ADVICE (OUTPATIENT)
Dept: FAMILY MEDICINE | Facility: CLINIC | Age: 54
End: 2024-05-04
Payer: COMMERCIAL

## 2024-05-04 DIAGNOSIS — E11.65 TYPE 2 DIABETES MELLITUS WITH HYPERGLYCEMIA, WITHOUT LONG-TERM CURRENT USE OF INSULIN (H): ICD-10-CM

## 2024-05-04 DIAGNOSIS — I10 HYPERTENSION GOAL BP (BLOOD PRESSURE) < 140/90: ICD-10-CM

## 2024-05-04 DIAGNOSIS — E78.5 HYPERLIPIDEMIA LDL GOAL <100: ICD-10-CM

## 2024-05-06 RX ORDER — SIMVASTATIN 20 MG
20 TABLET ORAL AT BEDTIME
Qty: 90 TABLET | Refills: 0 | Status: SHIPPED | OUTPATIENT
Start: 2024-05-06 | End: 2024-05-20

## 2024-05-06 RX ORDER — LISINOPRIL 10 MG/1
10 TABLET ORAL DAILY
Qty: 90 TABLET | Refills: 0 | Status: SHIPPED | OUTPATIENT
Start: 2024-05-06 | End: 2024-05-20

## 2024-05-20 ENCOUNTER — OFFICE VISIT (OUTPATIENT)
Dept: FAMILY MEDICINE | Facility: CLINIC | Age: 54
End: 2024-05-20
Payer: COMMERCIAL

## 2024-05-20 VITALS
RESPIRATION RATE: 16 BRPM | HEIGHT: 73 IN | WEIGHT: 250 LBS | DIASTOLIC BLOOD PRESSURE: 82 MMHG | HEART RATE: 103 BPM | OXYGEN SATURATION: 97 % | TEMPERATURE: 98.5 F | BODY MASS INDEX: 33.13 KG/M2 | SYSTOLIC BLOOD PRESSURE: 124 MMHG

## 2024-05-20 DIAGNOSIS — I10 HYPERTENSION GOAL BP (BLOOD PRESSURE) < 140/90: ICD-10-CM

## 2024-05-20 DIAGNOSIS — E11.9 TYPE 2 DIABETES MELLITUS WITHOUT COMPLICATION, WITHOUT LONG-TERM CURRENT USE OF INSULIN (H): Primary | ICD-10-CM

## 2024-05-20 DIAGNOSIS — Z12.11 SCREEN FOR COLON CANCER: ICD-10-CM

## 2024-05-20 DIAGNOSIS — N52.9 IMPOTENCE OF ORGANIC ORIGIN: ICD-10-CM

## 2024-05-20 DIAGNOSIS — Z12.5 SCREENING FOR PROSTATE CANCER: ICD-10-CM

## 2024-05-20 DIAGNOSIS — E78.5 HYPERLIPIDEMIA LDL GOAL <70: ICD-10-CM

## 2024-05-20 LAB
ALBUMIN SERPL BCG-MCNC: 4.3 G/DL (ref 3.5–5.2)
ALP SERPL-CCNC: 41 U/L (ref 40–150)
ALT SERPL W P-5'-P-CCNC: 29 U/L (ref 0–70)
ANION GAP SERPL CALCULATED.3IONS-SCNC: 14 MMOL/L (ref 7–15)
AST SERPL W P-5'-P-CCNC: 28 U/L (ref 0–45)
BILIRUB SERPL-MCNC: 0.4 MG/DL
BUN SERPL-MCNC: 16.1 MG/DL (ref 6–20)
CALCIUM SERPL-MCNC: 9.7 MG/DL (ref 8.6–10)
CHLORIDE SERPL-SCNC: 103 MMOL/L (ref 98–107)
CHOLEST SERPL-MCNC: 136 MG/DL
CREAT SERPL-MCNC: 0.95 MG/DL (ref 0.67–1.17)
CREAT UR-MCNC: 131.5 MG/DL
DEPRECATED HCO3 PLAS-SCNC: 22 MMOL/L (ref 22–29)
EGFRCR SERPLBLD CKD-EPI 2021: >90 ML/MIN/1.73M2
FASTING STATUS PATIENT QL REPORTED: NO
FASTING STATUS PATIENT QL REPORTED: NO
GLUCOSE SERPL-MCNC: 134 MG/DL (ref 70–99)
HBA1C MFR BLD: 7.2 % (ref 0–5.6)
HDLC SERPL-MCNC: 57 MG/DL
LDLC SERPL CALC-MCNC: 68 MG/DL
MICROALBUMIN UR-MCNC: 54.3 MG/L
MICROALBUMIN/CREAT UR: 41.29 MG/G CR (ref 0–17)
NONHDLC SERPL-MCNC: 79 MG/DL
POTASSIUM SERPL-SCNC: 4.5 MMOL/L (ref 3.4–5.3)
PROT SERPL-MCNC: 7.1 G/DL (ref 6.4–8.3)
PSA SERPL DL<=0.01 NG/ML-MCNC: 2.91 NG/ML (ref 0–3.5)
SODIUM SERPL-SCNC: 139 MMOL/L (ref 135–145)
TRIGL SERPL-MCNC: 56 MG/DL

## 2024-05-20 PROCEDURE — G0103 PSA SCREENING: HCPCS | Performed by: NURSE PRACTITIONER

## 2024-05-20 PROCEDURE — 99214 OFFICE O/P EST MOD 30 MIN: CPT | Performed by: NURSE PRACTITIONER

## 2024-05-20 PROCEDURE — 82043 UR ALBUMIN QUANTITATIVE: CPT | Performed by: NURSE PRACTITIONER

## 2024-05-20 PROCEDURE — 80053 COMPREHEN METABOLIC PANEL: CPT | Performed by: NURSE PRACTITIONER

## 2024-05-20 PROCEDURE — 83036 HEMOGLOBIN GLYCOSYLATED A1C: CPT | Performed by: NURSE PRACTITIONER

## 2024-05-20 PROCEDURE — G2211 COMPLEX E/M VISIT ADD ON: HCPCS | Performed by: NURSE PRACTITIONER

## 2024-05-20 PROCEDURE — 36415 COLL VENOUS BLD VENIPUNCTURE: CPT | Performed by: NURSE PRACTITIONER

## 2024-05-20 PROCEDURE — 80061 LIPID PANEL: CPT | Performed by: NURSE PRACTITIONER

## 2024-05-20 PROCEDURE — 82570 ASSAY OF URINE CREATININE: CPT | Performed by: NURSE PRACTITIONER

## 2024-05-20 PROCEDURE — 99207 PR FOOT EXAM NO CHARGE: CPT | Performed by: NURSE PRACTITIONER

## 2024-05-20 RX ORDER — LISINOPRIL 10 MG/1
10 TABLET ORAL DAILY
Qty: 90 TABLET | Refills: 3 | Status: SHIPPED | OUTPATIENT
Start: 2024-05-20

## 2024-05-20 RX ORDER — SILDENAFIL CITRATE 20 MG/1
TABLET ORAL
Qty: 50 TABLET | Refills: 3 | Status: SHIPPED | OUTPATIENT
Start: 2024-05-20

## 2024-05-20 RX ORDER — DULAGLUTIDE 4.5 MG/.5ML
4.5 INJECTION, SOLUTION SUBCUTANEOUS
Qty: 6 ML | Refills: 3 | Status: SHIPPED | OUTPATIENT
Start: 2024-05-20 | End: 2024-06-13

## 2024-05-20 RX ORDER — SIMVASTATIN 20 MG
20 TABLET ORAL AT BEDTIME
Qty: 90 TABLET | Refills: 3 | Status: SHIPPED | OUTPATIENT
Start: 2024-05-20

## 2024-05-20 ASSESSMENT — PAIN SCALES - GENERAL: PAINLEVEL: MILD PAIN (2)

## 2024-05-20 NOTE — PROGRESS NOTES
Assessment & Plan     Type 2 diabetes mellitus without complication, without long-term current use of insulin (H)  Stable with current medication, work on lifestyle to get hemoglobin A1C down to less than 7  - Lipid panel reflex to direct LDL Non-fasting; Future  - HEMOGLOBIN A1C; Future  - Albumin Random Urine Quantitative with Creat Ratio; Future  - Comprehensive metabolic panel (BMP + Alb, Alk Phos, ALT, AST, Total. Bili, TP); Future  - Dulaglutide (TRULICITY) 4.5 MG/0.5ML SOPN; Inject 4.5 mg Subcutaneous every 7 days  - metFORMIN (GLUCOPHAGE) 1000 MG tablet; Take 1 tablet (1,000 mg) by mouth 2 times daily (with meals)  - Lipid panel reflex to direct LDL Non-fasting  - HEMOGLOBIN A1C  - Albumin Random Urine Quantitative with Creat Ratio  - Comprehensive metabolic panel (BMP + Alb, Alk Phos, ALT, AST, Total. Bili, TP)  - FOOT EXAM    Hyperlipidemia LDL goal <70  Annual labs pending  - Comprehensive metabolic panel (BMP + Alb, Alk Phos, ALT, AST, Total. Bili, TP); Future  - Comprehensive metabolic panel (BMP + Alb, Alk Phos, ALT, AST, Total. Bili, TP)  - simvastatin (ZOCOR) 20 MG tablet; Take 1 tablet (20 mg) by mouth at bedtime    Hypertension goal BP (blood pressure) < 140/90  Stable with current medication   - Comprehensive metabolic panel (BMP + Alb, Alk Phos, ALT, AST, Total. Bili, TP); Future  - lisinopril (ZESTRIL) 10 MG tablet; Take 1 tablet (10 mg) by mouth daily  - Comprehensive metabolic panel (BMP + Alb, Alk Phos, ALT, AST, Total. Bili, TP)    Impotence of organic origin  Stable.   - sildenafil (REVATIO) 20 MG tablet; TAKE 1 TO 3 TABLETS BY MOUTH 30 MINUTES TO 4 HOURS BEFORE INTERCOURSE AS NEEDED    Screen for colon cancer    - Colonoscopy Screening  Referral; Future    Screening for prostate cancer    - PSA, screen; Future    The longitudinal plan of care for the diagnosis(es)/condition(s) as documented were addressed during this visit. Due to the added complexity in care, I will continue to  "support Price in the subsequent management and with ongoing continuity of care.    Subjective   Price is a 54 year old, presenting for the following health issues:  Diabetes        5/20/2024     5:11 PM   Additional Questions   Roomed by NORA Marin     History of Present Illness       Reason for visit:  A1C    He eats 2-3 servings of fruits and vegetables daily.He consumes 4 sweetened beverage(s) daily.He exercises with enough effort to increase his heart rate 20 to 29 minutes per day.  He exercises with enough effort to increase his heart rate 3 or less days per week.   He is taking medications regularly.     Diabetes Follow-up    How often are you checking your blood sugar? A few times a month  What time of day are you checking your blood sugars (select all that apply)?  Before and after meals  Have you had any blood sugars above 200?  No  Have you had any blood sugars below 70?  No  What symptoms do you notice when your blood sugar is low?  \"Off\"   What concerns do you have today about your diabetes? None   Do you have any of these symptoms? (Select all that apply)  No numbness or tingling in feet.  No redness, sores or blisters on feet.  No complaints of excessive thirst.  No reports of blurry vision.  No significant changes to weight.  Have you had a diabetic eye exam in the last 12 months? Yes- Date of last eye exam: Feb 1st ,  Location: [facility]     Hyperlipidemia Follow-Up    Are you regularly taking any medication or supplement to lower your cholesterol?   Yes- simvastatin   Are you having muscle aches or other side effects that you think could be caused by your cholesterol lowering medication?  No    Hypertension Follow-up    Do you check your blood pressure regularly outside of the clinic? Yes   Are you following a low salt diet? No  Are your blood pressures ever more than 140 on the top number (systolic) OR more   than 90 on the bottom number (diastolic), for example 140/90? No    Wakes up " "sometimes once to go to the bathroom at night  Stream weak in the morning but otherwise normal     BP Readings from Last 2 Encounters:   05/20/24 124/82   05/03/23 110/62     Hemoglobin A1C (%)   Date Value   05/20/2024 7.2 (H)   05/03/2023 7.5 (H)   01/20/2021 7.8 (H)   02/13/2020 7.7 (H)     LDL Cholesterol Calculated (mg/dL)   Date Value   05/03/2023 79   01/20/2022 67   01/20/2021 80   02/13/2020 90         Review of Systems  Constitutional, HEENT, cardiovascular, pulmonary, gi and gu systems are negative, except as otherwise noted.      Objective    /82 (Cuff Size: Adult Large)   Pulse 103   Temp 98.5  F (36.9  C) (Tympanic)   Resp 16   Ht 1.854 m (6' 1\")   Wt 113.4 kg (250 lb)   SpO2 97%   BMI 32.98 kg/m    Body mass index is 32.98 kg/m .  Physical Exam   GENERAL: alert and no distress  NECK: no adenopathy, no asymmetry, masses, or scars  RESP: lungs clear to auscultation - no rales, rhonchi or wheezes  CV: regular rate and rhythm, normal S1 S2, no S3 or S4, no murmur, click or rub, no peripheral edema  ABDOMEN: soft, nontender  MS: no gross musculoskeletal defects noted, no edema  PSYCH: mentation appears normal and affect normal/bright  Diabetic foot exam: normal DP and PT pulses, no trophic changes or ulcerative lesions, normal sensory exam, and normal monofilament exam    Results for orders placed or performed in visit on 05/20/24   HEMOGLOBIN A1C     Status: Abnormal   Result Value Ref Range    Hemoglobin A1C 7.2 (H) 0.0 - 5.6 %           Signed Electronically by: NIURKA Rosario CNP    "

## 2024-05-21 ENCOUNTER — TELEPHONE (OUTPATIENT)
Dept: FAMILY MEDICINE | Facility: CLINIC | Age: 54
End: 2024-05-21
Payer: COMMERCIAL

## 2024-05-21 NOTE — TELEPHONE ENCOUNTER
Prior Authorization Retail Medication Request    Medication/Dose: sildenafil  Diagnosis and ICD code (if different than what is on RX):    New/renewal/insurance change PA/secondary ins. PA:  Previously Tried and Failed:    Rationale:      Cover my meds: [id number]

## 2024-05-22 NOTE — TELEPHONE ENCOUNTER
Retail Pharmacy Prior Authorization Team   Phone: [phone]    PA Initiation    Medication: TRULICITY 4.5 MG/0.5ML SC SOPN  Insurance Company: [employer] - Phone [phone] Fax [phone]  Pharmacy Filling the Rx: [facility] - [address]  Filling Pharmacy Phone: [phone]  Filling Pharmacy Fax:    Start Date: 5/22/2024    RECEIVED QUESTION SET FROM INSURANCE, FILLED OUT AND FAXED BACK -

## 2024-05-24 NOTE — TELEPHONE ENCOUNTER
Prior Authorization Approval    Medication: TRULICITY 4.5 MG/0.5ML SC SOPN  Authorization Effective Date: 4/24/2024  Authorization Expiration Date: 5/24/2025  Insurance Company: [employer] - Phone [phone] Fax [phone]  Which Pharmacy is filling the prescription: [facility] - [address]  Pharmacy Notified: YES  Patient Notified: YES (faxed approval info to pharmacy and notified patient via ROBLOXt message)      PRIOR AUTHORIZATION FOLLOW-UP  Spoke with Rep at [facility] - states the request had been cancelled today. It does require a prior authorization so we completed criteria questions over the phone.    Received an immediate approval - Case# [id number] 4/24/24-5/24/25

## 2024-06-01 NOTE — TELEPHONE ENCOUNTER
PA Initiation    Medication: SILDENAFIL CITRATE 20 MG PO TABS  Insurance Company: [employer] Non-Specialty PA's - Phone [phone] Fax [phone]  Pharmacy Filling the Rx: [facility] [address] [place] [address]  Filling Pharmacy Phone: [phone]  Filling Pharmacy Fax:    Start Date: 5/31/2024

## 2024-06-01 NOTE — TELEPHONE ENCOUNTER
PRIOR AUTHORIZATION DENIED    Medication: SILDENAFIL CITRATE 20 MG PO TABS  Insurance Company: [employer] Non-Specialty PA's - Phone [phone] Fax [phone]  Denial Date: 5/31/2024  Denial Reason(s): PLAN EXCLUSION FOR NON-COVERED DIAGNOSIS CODE    Appeal Information: CANNOT BE APPEALED UNLESS DX IS CHANGED TO COVERED DX STATED ABOVE  Patient Notified: NO

## 2024-06-13 ENCOUNTER — MYC MEDICAL ADVICE (OUTPATIENT)
Dept: FAMILY MEDICINE | Facility: CLINIC | Age: 54
End: 2024-06-13
Payer: COMMERCIAL

## 2024-06-13 DIAGNOSIS — E11.9 TYPE 2 DIABETES MELLITUS WITHOUT COMPLICATION, WITHOUT LONG-TERM CURRENT USE OF INSULIN (H): ICD-10-CM

## 2024-06-13 RX ORDER — DULAGLUTIDE 4.5 MG/.5ML
4.5 INJECTION, SOLUTION SUBCUTANEOUS
Qty: 6 ML | Refills: 3 | Status: SHIPPED | OUTPATIENT
Start: 2024-06-13 | End: 2024-06-18

## 2024-06-16 ENCOUNTER — HEALTH MAINTENANCE LETTER (OUTPATIENT)
Age: 54
End: 2024-06-16

## 2024-06-18 ENCOUNTER — TELEPHONE (OUTPATIENT)
Dept: FAMILY MEDICINE | Facility: CLINIC | Age: 54
End: 2024-06-18
Payer: COMMERCIAL

## 2024-06-18 RX ORDER — SEMAGLUTIDE 1.34 MG/ML
1 INJECTION, SOLUTION SUBCUTANEOUS WEEKLY
Qty: 3 ML | Refills: 1 | Status: SHIPPED | OUTPATIENT
Start: 2024-08-13 | End: 2024-11-11

## 2024-06-18 RX ORDER — SEMAGLUTIDE 0.68 MG/ML
0.25 INJECTION, SOLUTION SUBCUTANEOUS
Qty: 3 ML | Refills: 0 | Status: SHIPPED | OUTPATIENT
Start: 2024-06-18 | End: 2024-07-16

## 2024-06-18 RX ORDER — SEMAGLUTIDE 0.68 MG/ML
0.5 INJECTION, SOLUTION SUBCUTANEOUS
Qty: 3 ML | Refills: 0 | Status: SHIPPED | OUTPATIENT
Start: 2024-07-16 | End: 2024-08-13

## 2024-06-18 NOTE — TELEPHONE ENCOUNTER
Prior Authorization Retail Medication Request    Medication/Dose: Ozempic 2MG/3ML  Diagnosis and ICD code (if different than what is on RX):    New/renewal/insurance change PA/secondary ins. PA:  Previously Tried and Failed:    Rationale:      Insurance   Primary:   Insurance ID:      Secondary (if applicable):  Insurance ID:      Pharmacy Information (if different than what is on RX)  Name:    Phone:   Fax:    Cover my meds: [id number]

## 2024-06-21 NOTE — TELEPHONE ENCOUNTER
Spoke with pharmacy. Unsure what has happened. PA was archived and not completed but medication is ready for  at pharmacy with $0 copay

## 2024-07-15 ENCOUNTER — DOCUMENTATION ONLY (OUTPATIENT)
Dept: SLEEP MEDICINE | Facility: CLINIC | Age: 54
End: 2024-07-15
Payer: COMMERCIAL

## 2024-07-15 DIAGNOSIS — G47.33 OBSTRUCTIVE SLEEP APNEA (ADULT) (PEDIATRIC): Primary | ICD-10-CM

## 2024-07-15 RX ORDER — SEMAGLUTIDE 0.68 MG/ML
INJECTION, SOLUTION SUBCUTANEOUS
Refills: 0 | OUTPATIENT
Start: 2024-07-15

## 2024-07-15 NOTE — TELEPHONE ENCOUNTER
Patient called back and stating to cancel this request. States it was sent to [facility] in [place].    Guillermo OROURKE RN  [facility]

## 2024-11-14 DIAGNOSIS — E11.9 TYPE 2 DIABETES MELLITUS WITHOUT COMPLICATION, WITHOUT LONG-TERM CURRENT USE OF INSULIN (H): ICD-10-CM

## 2024-11-15 RX ORDER — SEMAGLUTIDE 1.34 MG/ML
INJECTION, SOLUTION SUBCUTANEOUS
Qty: 3 ML | Refills: 0 | Status: SHIPPED | OUTPATIENT
Start: 2024-11-15

## 2024-12-08 DIAGNOSIS — E11.9 TYPE 2 DIABETES MELLITUS WITHOUT COMPLICATION, WITHOUT LONG-TERM CURRENT USE OF INSULIN (H): ICD-10-CM

## 2024-12-09 ENCOUNTER — MYC MEDICAL ADVICE (OUTPATIENT)
Dept: FAMILY MEDICINE | Facility: CLINIC | Age: 54
End: 2024-12-09
Payer: COMMERCIAL

## 2024-12-09 RX ORDER — SEMAGLUTIDE 1.34 MG/ML
INJECTION, SOLUTION SUBCUTANEOUS
Qty: 3 ML | Refills: 0 | Status: SHIPPED | OUTPATIENT
Start: 2024-12-09

## 2024-12-13 ENCOUNTER — TELEPHONE (OUTPATIENT)
Dept: FAMILY MEDICINE | Facility: CLINIC | Age: 54
End: 2024-12-13
Payer: COMMERCIAL

## 2024-12-13 NOTE — TELEPHONE ENCOUNTER
Prior Authorization Retail Medication Request    Medication/Dose: Ozempic 1 mg/dose  Diagnosis and ICD code (if different than what is on RX):    New/renewal/insurance change PA/secondary ins. PA:  Previously Tried and Failed:    Rationale:      Cover[employer]ymed Key: [id number]     Clinic Information  Preferred routing pool for dept communication: [id number]

## 2024-12-17 NOTE — TELEPHONE ENCOUNTER
Central Prior Authorization Team   Phone: [phone]    PA Initiation    Medication: Ozempic 1 mg/dose  Insurance Company: [employer] - Phone [phone] Fax [phone]  Pharmacy Filling the Rx: [facility] [address] [place] [address]  Filling Pharmacy Phone: [phone]  Filling Pharmacy Fax:    Start Date: 12/17/2024

## 2024-12-17 NOTE — TELEPHONE ENCOUNTER
Prior Authorization Approval    Authorization Effective Date: 11/17/2024  Authorization Expiration Date: 12/17/2025  Medication: Ozempic 1 mg/dose  Approved Dose/Quantity:    Reference #:     Insurance Company: [employer] - Phone [phone] Fax [phone]  Expected CoPay:       CoPay Card Available:      Foundation Assistance Needed:    Which Pharmacy is filling the prescription (Not needed for infusion/clinic administered): [facility] [address] [place] [address]  Pharmacy Notified:  Yes  Patient Notified:  **Instructed pharmacy to notify patient when script is ready to /ship.**

## 2024-12-19 ENCOUNTER — TELEPHONE (OUTPATIENT)
Dept: FAMILY MEDICINE | Facility: CLINIC | Age: 54
End: 2024-12-19
Payer: COMMERCIAL

## 2024-12-19 NOTE — TELEPHONE ENCOUNTER
Patient Quality Outreach    Patient is due for the following:   Colon Cancer Screening    Action(s) Taken:   No follow up needed at this time.    Type of outreach:    Sent [employer] message.    Questions for provider review:    None           Jo Carreon CMA

## 2025-01-23 ENCOUNTER — TRANSFERRED RECORDS (OUTPATIENT)
Dept: HEALTH INFORMATION MANAGEMENT | Facility: CLINIC | Age: 55
End: 2025-01-23
Payer: COMMERCIAL

## 2025-02-25 ENCOUNTER — PATIENT OUTREACH (OUTPATIENT)
Dept: CARE COORDINATION | Facility: CLINIC | Age: 55
End: 2025-02-25
Payer: COMMERCIAL

## 2025-03-24 ENCOUNTER — TELEPHONE (OUTPATIENT)
Dept: FAMILY MEDICINE | Facility: CLINIC | Age: 55
End: 2025-03-24
Payer: COMMERCIAL

## 2025-03-24 NOTE — TELEPHONE ENCOUNTER
Patient Quality Outreach    Patient is due for the following:   Colon Cancer Screening    Action(s) Taken:   Schedule a Adult Preventative    Type of outreach:    Sent [facility] message.    Questions for provider review:    None         Jo Carreon CMA  Chart routed to none.

## 2025-04-01 ENCOUNTER — OFFICE VISIT (OUTPATIENT)
Dept: FAMILY MEDICINE | Facility: CLINIC | Age: 55
End: 2025-04-01
Payer: COMMERCIAL

## 2025-04-01 VITALS
HEART RATE: 107 BPM | OXYGEN SATURATION: 98 % | HEIGHT: 73 IN | DIASTOLIC BLOOD PRESSURE: 80 MMHG | BODY MASS INDEX: 31.01 KG/M2 | WEIGHT: 234 LBS | TEMPERATURE: 97.5 F | SYSTOLIC BLOOD PRESSURE: 124 MMHG | RESPIRATION RATE: 16 BRPM

## 2025-04-01 DIAGNOSIS — J40 BRONCHITIS: ICD-10-CM

## 2025-04-01 DIAGNOSIS — Z11.3 SCREEN FOR STD (SEXUALLY TRANSMITTED DISEASE): ICD-10-CM

## 2025-04-01 DIAGNOSIS — R30.9 PAIN WITH URINATION: Primary | ICD-10-CM

## 2025-04-01 DIAGNOSIS — E11.9 TYPE 2 DIABETES MELLITUS WITHOUT COMPLICATION, WITHOUT LONG-TERM CURRENT USE OF INSULIN (H): ICD-10-CM

## 2025-04-01 DIAGNOSIS — N30.90 BLADDER INFECTION: ICD-10-CM

## 2025-04-01 LAB
ALBUMIN UR-MCNC: 30 MG/DL
APPEARANCE UR: ABNORMAL
BACTERIA #/AREA URNS HPF: ABNORMAL /HPF
BILIRUB UR QL STRIP: NEGATIVE
C TRACH DNA SPEC QL NAA+PROBE: POSITIVE
CAOX CRY #/AREA URNS HPF: ABNORMAL /HPF
COLOR UR AUTO: YELLOW
GLUCOSE UR STRIP-MCNC: NEGATIVE MG/DL
HGB UR QL STRIP: NEGATIVE
KETONES UR STRIP-MCNC: NEGATIVE MG/DL
LEUKOCYTE ESTERASE UR QL STRIP: ABNORMAL
MUCOUS THREADS #/AREA URNS LPF: PRESENT /LPF
N GONORRHOEA DNA SPEC QL NAA+PROBE: POSITIVE
NITRATE UR QL: NEGATIVE
PH UR STRIP: 6 [PH] (ref 5–7)
RBC #/AREA URNS AUTO: ABNORMAL /HPF
SP GR UR STRIP: >=1.03 (ref 1–1.03)
SPECIMEN TYPE: ABNORMAL
SPECIMEN TYPE: ABNORMAL
SQUAMOUS #/AREA URNS AUTO: ABNORMAL /LPF
UROBILINOGEN UR STRIP-ACNC: 0.2 E.U./DL
WBC #/AREA URNS AUTO: ABNORMAL /HPF
WBC CLUMPS #/AREA URNS HPF: PRESENT /HPF

## 2025-04-01 PROCEDURE — 1126F AMNT PAIN NOTED NONE PRSNT: CPT | Performed by: FAMILY MEDICINE

## 2025-04-01 PROCEDURE — 3079F DIAST BP 80-89 MM HG: CPT | Performed by: FAMILY MEDICINE

## 2025-04-01 PROCEDURE — 81001 URINALYSIS AUTO W/SCOPE: CPT | Performed by: FAMILY MEDICINE

## 2025-04-01 PROCEDURE — 87086 URINE CULTURE/COLONY COUNT: CPT | Performed by: FAMILY MEDICINE

## 2025-04-01 PROCEDURE — 87591 N.GONORRHOEAE DNA AMP PROB: CPT | Performed by: FAMILY MEDICINE

## 2025-04-01 PROCEDURE — 3074F SYST BP LT 130 MM HG: CPT | Performed by: FAMILY MEDICINE

## 2025-04-01 PROCEDURE — 87491 CHLMYD TRACH DNA AMP PROBE: CPT | Performed by: FAMILY MEDICINE

## 2025-04-01 PROCEDURE — 99213 OFFICE O/P EST LOW 20 MIN: CPT | Performed by: FAMILY MEDICINE

## 2025-04-01 RX ORDER — CEPHALEXIN 500 MG/1
500 CAPSULE ORAL 2 TIMES DAILY
Qty: 14 CAPSULE | Refills: 0 | Status: SHIPPED | OUTPATIENT
Start: 2025-04-01

## 2025-04-01 RX ORDER — CODEINE PHOSPHATE AND GUAIFENESIN 10; 100 MG/5ML; MG/5ML
1-2 SOLUTION ORAL EVERY 4 HOURS PRN
Qty: 120 ML | Refills: 0 | Status: SHIPPED | OUTPATIENT
Start: 2025-04-01

## 2025-04-01 RX ORDER — PREDNISONE 20 MG/1
40 TABLET ORAL DAILY
Qty: 10 TABLET | Refills: 0 | Status: SHIPPED | OUTPATIENT
Start: 2025-04-01 | End: 2025-04-06

## 2025-04-01 ASSESSMENT — ENCOUNTER SYMPTOMS
ALLERGIC/IMMUNOLOGIC NEGATIVE: 1
DYSURIA: 1
FREQUENCY: 1
NEUROLOGICAL NEGATIVE: 1
PSYCHIATRIC NEGATIVE: 1
HEMATOLOGIC/LYMPHATIC NEGATIVE: 1
MUSCULOSKELETAL NEGATIVE: 1
COUGH: 1
CONSTITUTIONAL NEGATIVE: 1
CARDIOVASCULAR NEGATIVE: 1
GASTROINTESTINAL NEGATIVE: 1

## 2025-04-01 ASSESSMENT — PAIN SCALES - GENERAL: PAINLEVEL_OUTOF10: NO PAIN (0)

## 2025-04-01 NOTE — PROGRESS NOTES
Price was seen today for cough and uti.    Diagnoses and all orders for this visit:    Pain with urination  -     UA Macroscopic with reflex to Microscopic and Culture - Clinic Collect  -     UA Microscopic with Reflex to Culture  -     Urine Culture  -     UA suggestive of urinary tract infection    Bronchitis  -     predniSONE (DELTASONE) 20 MG tablet; Take 2 tablets (40 mg) by mouth daily for 5 days.  -     guaiFENesin-codeine (ROBITUSSIN AC) 100-10 MG/5ML solution; Take 5-10 mLs by mouth every 4 hours as needed for cough.  -     Lung exam is normal- likely viral     Screen for STD (sexually transmitted disease)  -     NEISSERIA GONORRHOEA PCR  -     CHLAMYDIA TRACHOMATIS PCR  -     Patient will be notified of results.    Bladder infection  -     cephALEXin (KEFLEX) 500 MG capsule; Take 1 capsule (500 mg) by mouth 2 times daily.  -      Antibiotics faxed     Type 2 diabetes mellitus without complication, without long-term current use of insulin (H)       -    Last A1C was over 8. Follows with his primary for management of this.        -    Has been on ozempic and has noticed a difference.               Subjective   Price is a 54 year old, presenting for the following health issues:      Patient is a 54-year-old here for cough that has been ongoing for a week.  Cough is mostly dry with sporadic phlegm production.  There is some chest tightness, no chest pain or chest pressure.  No shortness of breath or wheezing.  No fevers or chills.      The patient is also having urinary symptoms and has had some penile discharge and urgency and frequency.  Has a new sexual partner.  No fevers or chills.  No flank pain or lower abdominal pain. No blood in his urine.       Cough (Cough x one week.  Getting worse the past few days.) and UTI (Has noticed pain with urination.  No blood in the urine.  There is a discharge from the penis.  Concerned about a possible STD.  Wanting to discuss more with the MD.)        4/1/2025    11:31  AM   Additional Questions   Roomed by Naty Logan CMA   Accompanied by Self     Chief Complaint   Patient presents with    Cough     Cough x one week.  Getting worse the past few days.    UTI     Has noticed pain with urination.  No blood in the urine.  There is a discharge from the penis.  Concerned about a possible STD.  Wanting to discuss more with the MD.       History of Present Illness       Reason for visit:  Cough  Symptom onset:  3-7 days ago  Symptom intensity:  Moderate  Symptom progression:  Worsening  Had these symptoms before:  Yes  Has tried/received treatment for these symptoms:  Yes  Previous treatment was successful:  Yes   He is taking medications regularly.          Acute Illness  Acute illness concerns: Cough  Onset/Duration: One week.  Symptoms:  Fever: No  Chills/Sweats: No  Headache (location?): Not anymore than normal.  Sinus Pressure: Last week he did.  It is gone this week.  Conjunctivitis:  No  Ear Pain: no  Rhinorrhea: No  Congestion: YES-clear from the nose.  Sore Throat: No  Cough: YES-productive of dark sputum  Wheeze: YES- a little bit  Decreased Appetite: YES- hard to tell if related to the Ozempic or his illness.  Nausea: No  Vomiting: No  Diarrhea: No  Dysuria/Freq.: YES- pain with urination as times recently.  Has noticed pain with urination.  No blood in the urine.  There is a discharge from the penis.  Concerned about a possible STD.  Wanting to discuss more with the MD.  Dysuria or Hematuria: see above.  Fatigue/Achiness: YES- feeling run down.  Sick/Strep Exposure: No  Therapies tried and outcome: Took some Excedrin this morning.        Review of Systems   Constitutional: Negative.    HENT:  Positive for congestion.    Respiratory:  Positive for cough.    Cardiovascular: Negative.    Gastrointestinal: Negative.    Genitourinary:  Positive for dysuria, frequency, penile discharge and urgency.   Musculoskeletal: Negative.    Skin: Negative.    Allergic/Immunologic:  "Negative.    Neurological: Negative.    Hematological: Negative.    Psychiatric/Behavioral: Negative.            Objective    /80 (BP Location: Right arm, Patient Position: Chair, Cuff Size: Adult Large)   Pulse 107   Temp 97.5  F (36.4  C) (Tympanic)   Resp 16   Ht 1.854 m (6' 1\")   Wt 106.1 kg (234 lb)   SpO2 98%   BMI 30.87 kg/m    Body mass index is 30.87 kg/m .  Physical Exam   GENERAL: alert and no distress  EYES: Eyes grossly normal to inspection, PERRL and conjunctivae and sclerae normal  HENT: ear canals and TM's normal, nose and mouth without ulcers or lesions  NECK: no adenopathy, no asymmetry, masses, or scars  RESP: lungs clear to auscultation - no rales, rhonchi or wheezes  CV: regular rate and rhythm, normal S1 S2, no S3 or S4, no murmur, click or rub, no peripheral edema  ABDOMEN: soft, nontender, no hepatosplenomegaly, no masses and bowel sounds normal  MS: no gross musculoskeletal defects noted, no edema    Results for orders placed or performed in visit on 04/01/25 (from the past 24 hours)   UA Macroscopic with reflex to Microscopic and Culture - Clinic Collect    Specimen: Urine, Clean Catch   Result Value Ref Range    Color Urine Yellow Colorless, Straw, Light Yellow, Yellow    Appearance Urine Cloudy (A) Clear    Glucose Urine Negative Negative mg/dL    Bilirubin Urine Negative Negative    Ketones Urine Negative Negative mg/dL    Specific Gravity Urine >=1.030 1.003 - 1.035    Blood Urine Negative Negative    pH Urine 6.0 5.0 - 7.0    Protein Albumin Urine 30 (A) Negative mg/dL    Urobilinogen Urine 0.2 0.2, 1.0 E.U./dL    Nitrite Urine Negative Negative    Leukocyte Esterase Urine Small (A) Negative   UA Microscopic with Reflex to Culture   Result Value Ref Range    Bacteria Urine Moderate (A) None Seen /HPF    RBC Urine 0-2 0-2 /HPF /HPF    WBC Urine 25-50 (A) 0-5 /HPF /HPF    Squamous Epithelials Urine Few (A) None Seen /LPF    WBC Clumps Urine Present (A) None Seen /HPF    " Mucus Urine Present (A) None Seen /LPF    Calcium Oxalate Crystals Urine Few (A) None Seen /HPF           Signed Electronically by: Michael Brandt MD

## 2025-04-02 ENCOUNTER — ALLIED HEALTH/NURSE VISIT (OUTPATIENT)
Dept: FAMILY MEDICINE | Facility: CLINIC | Age: 55
End: 2025-04-02
Payer: COMMERCIAL

## 2025-04-02 DIAGNOSIS — A54.9 GONORRHEA IN MALE: Primary | ICD-10-CM

## 2025-04-02 LAB — BACTERIA UR CULT: NO GROWTH

## 2025-04-02 PROCEDURE — 99207 PR NO CHARGE NURSE ONLY: CPT

## 2025-04-02 PROCEDURE — 96372 THER/PROPH/DIAG INJ SC/IM: CPT | Performed by: FAMILY MEDICINE

## 2025-04-02 RX ORDER — CEFTRIAXONE SODIUM 1 G
1 VIAL (EA) INJECTION ONCE
Status: COMPLETED | OUTPATIENT
Start: 2025-04-02 | End: 2025-04-02

## 2025-04-02 RX ORDER — DOXYCYCLINE HYCLATE 100 MG
100 TABLET ORAL 2 TIMES DAILY
Qty: 20 TABLET | Refills: 0 | Status: SHIPPED | OUTPATIENT
Start: 2025-04-02

## 2025-04-02 RX ADMIN — Medication 1 G: at 10:44

## 2025-04-02 NOTE — RESULT ENCOUNTER NOTE
Patient to receive IM rocephin. I sent in a prescription for doxycycline for him to the [facility] pharmacy. He is to stop the keflex for the UTI.  Thanks  Dr Brandt

## 2025-04-02 NOTE — PROGRESS NOTES
Clinic Administered Medication Documentation        Patient was given 1G Rocephin with 2.1ml of 1% lidocaine. Prior to medication administration, verified patient's identity using patient s name and date of birth. Please see MAR and medication order for additional information. Patient instructed to remain in clinic for 15 minutes, report any adverse reaction to staff immediately, and remain in clinic for 15 minutes and report any adverse reaction to staff immediately but patient declined.    Vial/Syringe: Single dose vial. Was entire vial of medication used? Yes        Stone Gilmore RN

## 2025-04-30 ENCOUNTER — MYC MEDICAL ADVICE (OUTPATIENT)
Dept: FAMILY MEDICINE | Facility: CLINIC | Age: 55
End: 2025-04-30
Payer: COMMERCIAL

## 2025-04-30 DIAGNOSIS — E11.9 TYPE 2 DIABETES MELLITUS WITHOUT COMPLICATION, WITHOUT LONG-TERM CURRENT USE OF INSULIN (H): ICD-10-CM

## 2025-05-01 RX ORDER — BLOOD-GLUCOSE SENSOR
EACH MISCELLANEOUS
Qty: 2 EACH | Refills: 5 | Status: SHIPPED | OUTPATIENT
Start: 2025-05-01

## 2025-06-21 ENCOUNTER — HEALTH MAINTENANCE LETTER (OUTPATIENT)
Age: 55
End: 2025-06-21

## 2025-07-12 ENCOUNTER — HEALTH MAINTENANCE LETTER (OUTPATIENT)
Age: 55
End: 2025-07-12

## 2025-07-20 DIAGNOSIS — N52.9 IMPOTENCE OF ORGANIC ORIGIN: ICD-10-CM

## 2025-07-22 ENCOUNTER — MYC MEDICAL ADVICE (OUTPATIENT)
Dept: FAMILY MEDICINE | Facility: CLINIC | Age: 55
End: 2025-07-22

## 2025-07-23 ENCOUNTER — OFFICE VISIT (OUTPATIENT)
Dept: FAMILY MEDICINE | Facility: CLINIC | Age: 55
End: 2025-07-23
Payer: COMMERCIAL

## 2025-07-23 ENCOUNTER — TELEPHONE (OUTPATIENT)
Dept: FAMILY MEDICINE | Facility: CLINIC | Age: 55
End: 2025-07-23

## 2025-07-23 VITALS
SYSTOLIC BLOOD PRESSURE: 137 MMHG | WEIGHT: 227 LBS | BODY MASS INDEX: 30.09 KG/M2 | HEART RATE: 99 BPM | HEIGHT: 73 IN | DIASTOLIC BLOOD PRESSURE: 85 MMHG | OXYGEN SATURATION: 99 % | TEMPERATURE: 97.6 F | RESPIRATION RATE: 22 BRPM

## 2025-07-23 DIAGNOSIS — S30.860A TICK BITE OF LOWER BACK, INITIAL ENCOUNTER: Primary | ICD-10-CM
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
## 2025-07-23 DIAGNOSIS — M25.50 MULTIPLE JOINT PAIN: ICD-10-CM

## 2025-07-23 DIAGNOSIS — M25.512 ACUTE PAIN OF LEFT SHOULDER: ICD-10-CM

## 2025-07-23 DIAGNOSIS — I10 HYPERTENSION GOAL BP (BLOOD PRESSURE) < 140/90: ICD-10-CM

## 2025-07-23 DIAGNOSIS — E11.9 TYPE 2 DIABETES MELLITUS WITHOUT COMPLICATION, WITHOUT LONG-TERM CURRENT USE OF INSULIN (H): ICD-10-CM

## 2025-07-23 LAB
ALBUMIN SERPL BCG-MCNC: 4.2 G/DL (ref 3.5–5.2)
ALP SERPL-CCNC: 46 U/L (ref 40–150)
ALT SERPL W P-5'-P-CCNC: 20 U/L (ref 0–70)
ANION GAP SERPL CALCULATED.3IONS-SCNC: 10 MMOL/L (ref 7–15)
AST SERPL W P-5'-P-CCNC: 25 U/L (ref 0–45)
BILIRUB SERPL-MCNC: 0.5 MG/DL
BUN SERPL-MCNC: 17.7 MG/DL (ref 6–20)
CALCIUM SERPL-MCNC: 9.8 MG/DL (ref 8.8–10.4)
CHLORIDE SERPL-SCNC: 101 MMOL/L (ref 98–107)
CREAT SERPL-MCNC: 0.89 MG/DL (ref 0.67–1.17)
CREAT UR-MCNC: 206.5 MG/DL
CRP SERPL-MCNC: 81.41 MG/L
EGFRCR SERPLBLD CKD-EPI 2021: >90 ML/MIN/1.73M2
ERYTHROCYTE [DISTWIDTH] IN BLOOD BY AUTOMATED COUNT: 13.5 % (ref 10–15)
ERYTHROCYTE [SEDIMENTATION RATE] IN BLOOD BY WESTERGREN METHOD: 14 MM/HR (ref 0–20)
EST. AVERAGE GLUCOSE BLD GHB EST-MCNC: 143 MG/DL
GLUCOSE SERPL-MCNC: 147 MG/DL (ref 70–99)
HBA1C MFR BLD: 6.6 % (ref 0–5.6)
HCO3 SERPL-SCNC: 26 MMOL/L (ref 22–29)
HCT VFR BLD AUTO: 44.9 % (ref 40–53)
HGB BLD-MCNC: 14.9 G/DL (ref 13.3–17.7)
MCH RBC QN AUTO: 29.4 PG (ref 26.5–33)
MCHC RBC AUTO-ENTMCNC: 33.2 G/DL (ref 31.5–36.5)
MCV RBC AUTO: 89 FL (ref 78–100)
MICROALBUMIN UR-MCNC: 47.7 MG/L
MICROALBUMIN/CREAT UR: 23.1 MG/G CR (ref 0–17)
PLATELET # BLD AUTO: 213 10E3/UL (ref 150–450)
POTASSIUM SERPL-SCNC: 5 MMOL/L (ref 3.4–5.3)
PROT SERPL-MCNC: 7.2 G/DL (ref 6.4–8.3)
RBC # BLD AUTO: 5.07 10E6/UL (ref 4.4–5.9)
SODIUM SERPL-SCNC: 137 MMOL/L (ref 135–145)
WBC # BLD AUTO: 10.9 10E3/UL (ref 4–11)

## 2025-07-23 PROCEDURE — 87468 ANAPLSMA PHGCYTOPHLM AMP PRB: CPT | Performed by: FAMILY MEDICINE

## 2025-07-23 PROCEDURE — 86618 LYME DISEASE ANTIBODY: CPT | Performed by: FAMILY MEDICINE

## 2025-07-23 PROCEDURE — 3044F HG A1C LEVEL LT 7.0%: CPT | Performed by: FAMILY MEDICINE

## 2025-07-23 PROCEDURE — 82043 UR ALBUMIN QUANTITATIVE: CPT | Performed by: FAMILY MEDICINE

## 2025-07-23 PROCEDURE — 87798 DETECT AGENT NOS DNA AMP: CPT | Performed by: FAMILY MEDICINE

## 2025-07-23 PROCEDURE — 36415 COLL VENOUS BLD VENIPUNCTURE: CPT | Performed by: FAMILY MEDICINE

## 2025-07-23 PROCEDURE — 82570 ASSAY OF URINE CREATININE: CPT | Performed by: FAMILY MEDICINE

## 2025-07-23 PROCEDURE — 83036 HEMOGLOBIN GLYCOSYLATED A1C: CPT | Performed by: FAMILY MEDICINE

## 2025-07-23 PROCEDURE — 85027 COMPLETE CBC AUTOMATED: CPT | Performed by: FAMILY MEDICINE

## 2025-07-23 PROCEDURE — 3079F DIAST BP 80-89 MM HG: CPT | Performed by: FAMILY MEDICINE

## 2025-07-23 PROCEDURE — 1125F AMNT PAIN NOTED PAIN PRSNT: CPT | Performed by: FAMILY MEDICINE

## 2025-07-23 PROCEDURE — 86140 C-REACTIVE PROTEIN: CPT | Performed by: FAMILY MEDICINE

## 2025-07-23 PROCEDURE — 99214 OFFICE O/P EST MOD 30 MIN: CPT | Performed by: FAMILY MEDICINE

## 2025-07-23 PROCEDURE — 85652 RBC SED RATE AUTOMATED: CPT | Performed by: FAMILY MEDICINE

## 2025-07-23 PROCEDURE — 3075F SYST BP GE 130 - 139MM HG: CPT | Performed by: FAMILY MEDICINE

## 2025-07-23 PROCEDURE — 80053 COMPREHEN METABOLIC PANEL: CPT | Performed by: FAMILY MEDICINE

## 2025-07-23 RX ORDER — DOXYCYCLINE 100 MG/1
100 CAPSULE ORAL 2 TIMES DAILY
Qty: 56 CAPSULE | Refills: 0 | Status: SHIPPED | OUTPATIENT
Start: 2025-07-23 | End: 2025-08-20

## 2025-07-23 ASSESSMENT — PAIN SCALES - GENERAL: PAINLEVEL_OUTOF10: MODERATE PAIN (4)

## 2025-07-23 ASSESSMENT — ENCOUNTER SYMPTOMS: FATIGUE: 1

## 2025-07-23 NOTE — PROGRESS NOTES
Assessment & Plan     Tick bite of lower back, initial encounter  Patient removed deer tick over 2 weeks ago [place].  No erythema migrans with significant joint pain.  Tickborne panel and Lyme's panel plan to treat with doxycycline  - LYME DISEASE TOTAL ANTIBODIES WITH REFLEX TO CONFIRMATION  - Tick-Borne Disease Panel (Non-Lyme) by PCR  - doxycycline hyclate (VIBRAMYCIN) 100 MG capsule  Dispense: 56 capsule; Refill: 0    Multiple joint pain  - Comprehensive metabolic panel (BMP + Alb, Alk Phos, ALT, AST, Total. Bili, TP)  - CBC with platelets  - CRP, inflammation  - ESR: Erythrocyte sedimentation rate  - doxycycline hyclate (VIBRAMYCIN) 100 MG capsule  Dispense: 56 capsule; Refill: 0    Acute pain of left shoulder  Started at the onset of other joint pain however range of motion has been diminished in the left shoulder, possibly could be from Lyme's however loss of range of motion question of early frozen shoulder.  Physical therapy referral and exercises provided to the patient.  If symptoms get better on antibiotic he can cancel PT appointment  - Physical Therapy  Referral    Type 2 diabetes mellitus without complication, without long-term current use of insulin (H)  Update A1c, he is due for office visit and annual exam  - Albumin Random Urine Quantitative with Creat Ratio  - HEMOGLOBIN A1C    Hypertension goal BP (blood pressure) < 140/90  - Comprehensive metabolic panel (BMP + Alb, Alk Phos, ALT, AST, Total. Bili, TP)      Juan Thrasher is a 55 year old, presenting for the following health issues:  Fatigue and Musculoskeletal Problem        7/23/2025     9:15 AM   Additional Questions   Roomed by Xochitl NEWSOME MA   Accompanied by Self     Fatigue  Associated symptoms include fatigue.   Musculoskeletal Problem  Associated symptoms include fatigue.   History of Present Illness       Reason for visit:  Want to be checked for lymes  Symptom onset:  1-3 days  "ago  Symptoms include:  Joint pain fatigue brain fog headaches  Symptom intensity:  Severe  Symptom progression:  Worsening  Had these symptoms before:  No   He is taking medications regularly.                      Objective    /85 (BP Location: Right arm, Patient Position: Sitting, Cuff Size: Adult Regular)   Pulse 99   Temp 97.6  F (36.4  C) (Tympanic)   Resp 22   Ht 1.854 m (6' 1\")   Wt 103 kg (227 lb)   SpO2 99%   BMI 29.95 kg/m    Body mass index is 29.95 kg/m .  Physical Exam   GENERAL: alert and no distress  EYES: Eyes grossly normal to inspection, PERRL and conjunctivae and sclerae normal  HENT: ear canals and TM's normal, nose and mouth without ulcers or lesions  NECK: no adenopathy, no asymmetry, masses, or scars  RESP: lungs clear to auscultation - no rales, rhonchi or wheezes  CV: regular rate and rhythm, normal S1 S2, no S3 or S4, no murmur, click or rub, no peripheral edema  ABDOMEN: soft, nontender, no hepatosplenomegaly, no masses and bowel sounds normal  MS: no gross musculoskeletal defects noted, no edema, left shoulder decreased range of motion  SKIN: no suspicious lesions or rashes  NEURO: Normal strength and tone, mentation intact and speech normal  PSYCH: mentation appears normal, affect normal/bright            Signed Electronically by: Milena Kelley,     "

## 2025-07-23 NOTE — TELEPHONE ENCOUNTER
S-(situation): patient and spouse Mercedes calling because the pain in Price's left shoulder has gotten so bad he cannot tolerate it.     B-(background): clinic appt this morning with Milena Kelley.labs pending including Lyme's.     A-(assessment): Started doxycycline after labs today. The pain in the shoulder has gotten a lot worse. Wife was going to take to the ER but decided to call the clinic first. Price took Tylenol 650 mg at noon and Ibuprofen 400 mg at about 10:30 AM and the pain has increased a lot. He is able to move his hand but winches whenever he tries to move his arm. No numbness, tingling or discoloration. He has other joint pain that was discussed at his visit.The shoulder pain this is the worst. No fever, headache. No neck pain, is able to touch his chin to his chest.     R-(recommendations): Advised patient to take Tylenol 1000 mg every 8 hours and can take up to 3200 mg of Ibuprofen in divided doses, 800 mg every 6 hours as needed on a temporary basis. Veronica advise. If RX, send to [facility] pharmacy please.    Tova WALDRON RN

## 2025-07-24 LAB
A PHAGOCYTOPH DNA BLD QL NAA+PROBE: NOT DETECTED
B BURGDOR IGG+IGM SER QL: 0.08
BABESIA DNA BLD QL NAA+PROBE: NOT DETECTED
EHRLICHIA DNA SPEC QL NAA+PROBE: NOT DETECTED

## 2025-07-24 RX ORDER — SILDENAFIL CITRATE 20 MG/1
TABLET ORAL
Qty: 50 TABLET | Refills: 0 | Status: SHIPPED | OUTPATIENT
Start: 2025-07-24

## 2025-07-24 RX ORDER — HYDROCODONE BITARTRATE AND ACETAMINOPHEN 5; 325 MG/1; MG/1
1 TABLET ORAL EVERY 6 HOURS PRN
Qty: 10 TABLET | Refills: 0 | Status: SHIPPED | OUTPATIENT
Start: 2025-07-24 | End: 2025-07-24

## 2025-07-26 ENCOUNTER — ANESTHESIA (OUTPATIENT)
Dept: MEDSURG UNIT | Facility: CLINIC | Age: 55
End: 2025-07-26
Payer: COMMERCIAL

## 2025-07-26 ENCOUNTER — APPOINTMENT (OUTPATIENT)
Dept: GENERAL RADIOLOGY | Facility: CLINIC | Age: 55
End: 2025-07-26
Attending: EMERGENCY MEDICINE
Payer: COMMERCIAL

## 2025-07-26 ENCOUNTER — ANESTHESIA EVENT (OUTPATIENT)
Dept: SURGERY | Facility: CLINIC | Age: 55
End: 2025-07-26
Payer: COMMERCIAL

## 2025-07-26 ENCOUNTER — HOSPITAL ENCOUNTER (INPATIENT)
Facility: CLINIC | Age: 55
LOS: 3 days | Discharge: HOME | End: 2025-07-29
Attending: EMERGENCY MEDICINE | Admitting: INTERNAL MEDICINE
Payer: COMMERCIAL

## 2025-07-26 ENCOUNTER — APPOINTMENT (OUTPATIENT)
Dept: MRI IMAGING | Facility: CLINIC | Age: 55
End: 2025-07-26
Attending: FAMILY MEDICINE
Payer: COMMERCIAL

## 2025-07-26 ENCOUNTER — APPOINTMENT (OUTPATIENT)
Dept: CT IMAGING | Facility: CLINIC | Age: 55
End: 2025-07-26
Attending: EMERGENCY MEDICINE
Payer: COMMERCIAL

## 2025-07-26 ENCOUNTER — ANESTHESIA EVENT (OUTPATIENT)
Dept: MEDSURG UNIT | Facility: CLINIC | Age: 55
End: 2025-07-26
Payer: COMMERCIAL

## 2025-07-26 DIAGNOSIS — M25.412: Primary | ICD-10-CM

## 2025-07-26 DIAGNOSIS — M13.112: ICD-10-CM

## 2025-07-26 DIAGNOSIS — M25.512 ACUTE PAIN OF LEFT SHOULDER: ICD-10-CM

## 2025-07-26 LAB
ALBUMIN UR-MCNC: 10 MG/DL
ANION GAP SERPL CALCULATED.3IONS-SCNC: 14 MMOL/L (ref 7–15)
APPEARANCE UR: CLEAR
BASOPHILS # BLD AUTO: 0 10E3/UL (ref 0–0.2)
BASOPHILS NFR BLD AUTO: 0 %
BILIRUB UR QL STRIP: NEGATIVE
BUN SERPL-MCNC: 18.2 MG/DL (ref 6–20)
CALCIUM SERPL-MCNC: 9.5 MG/DL (ref 8.8–10.4)
CHLORIDE SERPL-SCNC: 98 MMOL/L (ref 98–107)
CK SERPL-CCNC: 79 U/L (ref 39–308)
COLOR UR AUTO: ABNORMAL
CREAT SERPL-MCNC: 0.76 MG/DL (ref 0.67–1.17)
CRP SERPL-MCNC: 167.27 MG/L
EGFRCR SERPLBLD CKD-EPI 2021: >90 ML/MIN/1.73M2
EOSINOPHIL # BLD AUTO: 0.3 10E3/UL (ref 0–0.7)
EOSINOPHIL NFR BLD AUTO: 3 %
ERYTHROCYTE [DISTWIDTH] IN BLOOD BY AUTOMATED COUNT: 13.2 % (ref 10–15)
GLUCOSE BLDC GLUCOMTR-MCNC: 133 MG/DL (ref 70–99)
GLUCOSE BLDC GLUCOMTR-MCNC: 160 MG/DL (ref 70–99)
GLUCOSE SERPL-MCNC: 143 MG/DL (ref 70–99)
GLUCOSE UR STRIP-MCNC: NEGATIVE MG/DL
HCO3 SERPL-SCNC: 21 MMOL/L (ref 22–29)
HCT VFR BLD AUTO: 41.1 % (ref 40–53)
HGB BLD-MCNC: 13.8 G/DL (ref 13.3–17.7)
HGB UR QL STRIP: NEGATIVE
IMM GRANULOCYTES # BLD: 0 10E3/UL
IMM GRANULOCYTES NFR BLD: 0 %
KETONES UR STRIP-MCNC: ABNORMAL MG/DL
LEUKOCYTE ESTERASE UR QL STRIP: NEGATIVE
LYMPHOCYTES # BLD AUTO: 2 10E3/UL (ref 0.8–5.3)
LYMPHOCYTES NFR BLD AUTO: 17 %
MCH RBC QN AUTO: 29.6 PG (ref 26.5–33)
MCHC RBC AUTO-ENTMCNC: 33.6 G/DL (ref 31.5–36.5)
MCV RBC AUTO: 88 FL (ref 78–100)
MONOCYTES # BLD AUTO: 1.5 10E3/UL (ref 0–1.3)
MONOCYTES NFR BLD AUTO: 13 %
NEUTROPHILS # BLD AUTO: 7.7 10E3/UL (ref 1.6–8.3)
NEUTROPHILS NFR BLD AUTO: 67 %
NITRATE UR QL: NEGATIVE
NRBC # BLD AUTO: 0 10E3/UL
NRBC BLD AUTO-RTO: 0 /100
PH UR STRIP: 6 [PH] (ref 5–7)
PLATELET # BLD AUTO: 240 10E3/UL (ref 150–450)
POTASSIUM SERPL-SCNC: 4.4 MMOL/L (ref 3.4–5.3)
RBC # BLD AUTO: 4.66 10E6/UL (ref 4.4–5.9)
RBC URINE: 1 /HPF
SODIUM SERPL-SCNC: 133 MMOL/L (ref 135–145)
SP GR UR STRIP: 1.01 (ref 1–1.03)
UROBILINOGEN UR STRIP-MCNC: NORMAL MG/DL
WBC # BLD AUTO: 11.5 10E3/UL (ref 4–11)
WBC URINE: 1 /HPF

## 2025-07-26 PROCEDURE — 99222 1ST HOSP IP/OBS MODERATE 55: CPT | Performed by: INTERNAL MEDICINE

## 2025-07-26 PROCEDURE — 99285 EMERGENCY DEPT VISIT HI MDM: CPT | Mod: 25 | Performed by: EMERGENCY MEDICINE

## 2025-07-26 PROCEDURE — 120N000001 HC R&B MED SURG/OB

## 2025-07-26 PROCEDURE — 370N000003 HC ANESTHESIA WARD SERVICE: Performed by: NURSE ANESTHETIST, CERTIFIED REGISTERED

## 2025-07-26 PROCEDURE — 86780 TREPONEMA PALLIDUM: CPT | Performed by: FAMILY MEDICINE

## 2025-07-26 PROCEDURE — 36415 COLL VENOUS BLD VENIPUNCTURE: CPT | Performed by: FAMILY MEDICINE

## 2025-07-26 PROCEDURE — 80048 BASIC METABOLIC PNL TOTAL CA: CPT | Performed by: EMERGENCY MEDICINE

## 2025-07-26 PROCEDURE — 87070 CULTURE OTHR SPECIMN AEROBIC: CPT | Performed by: FAMILY MEDICINE

## 2025-07-26 PROCEDURE — 250N000011 HC RX IP 250 OP 636: Performed by: EMERGENCY MEDICINE

## 2025-07-26 PROCEDURE — 250N000013 HC RX MED GY IP 250 OP 250 PS 637: Performed by: EMERGENCY MEDICINE

## 2025-07-26 PROCEDURE — 250N000013 HC RX MED GY IP 250 OP 250 PS 637: Performed by: INTERNAL MEDICINE

## 2025-07-26 PROCEDURE — 85004 AUTOMATED DIFF WBC COUNT: CPT | Performed by: EMERGENCY MEDICINE

## 2025-07-26 PROCEDURE — 36415 COLL VENOUS BLD VENIPUNCTURE: CPT | Performed by: EMERGENCY MEDICINE

## 2025-07-26 PROCEDURE — 87040 BLOOD CULTURE FOR BACTERIA: CPT | Performed by: FAMILY MEDICINE

## 2025-07-26 PROCEDURE — 73030 X-RAY EXAM OF SHOULDER: CPT | Mod: LT

## 2025-07-26 PROCEDURE — 81003 URINALYSIS AUTO W/O SCOPE: CPT | Performed by: FAMILY MEDICINE

## 2025-07-26 PROCEDURE — 99285 EMERGENCY DEPT VISIT HI MDM: CPT | Performed by: FAMILY MEDICINE

## 2025-07-26 PROCEDURE — 250N000009 HC RX 250: Performed by: EMERGENCY MEDICINE

## 2025-07-26 PROCEDURE — 250N000013 HC RX MED GY IP 250 OP 250 PS 637: Performed by: FAMILY MEDICINE

## 2025-07-26 PROCEDURE — 87591 N.GONORRHOEAE DNA AMP PROB: CPT | Performed by: FAMILY MEDICINE

## 2025-07-26 PROCEDURE — 87389 HIV-1 AG W/HIV-1&-2 AB AG IA: CPT | Performed by: FAMILY MEDICINE

## 2025-07-26 PROCEDURE — 86140 C-REACTIVE PROTEIN: CPT | Performed by: EMERGENCY MEDICINE

## 2025-07-26 PROCEDURE — A9585 GADOBUTROL INJECTION: HCPCS | Performed by: FAMILY MEDICINE

## 2025-07-26 PROCEDURE — 250N000011 HC RX IP 250 OP 636: Mod: JW | Performed by: FAMILY MEDICINE

## 2025-07-26 PROCEDURE — 250N000011 HC RX IP 250 OP 636: Performed by: NURSE ANESTHETIST, CERTIFIED REGISTERED

## 2025-07-26 PROCEDURE — 73201 CT UPPER EXTREMITY W/DYE: CPT | Mod: LT

## 2025-07-26 PROCEDURE — 87491 CHLMYD TRACH DNA AMP PROBE: CPT | Performed by: FAMILY MEDICINE

## 2025-07-26 PROCEDURE — 73223 MRI JOINT UPR EXTR W/O&W/DYE: CPT | Mod: LT

## 2025-07-26 PROCEDURE — 82550 ASSAY OF CK (CPK): CPT | Performed by: EMERGENCY MEDICINE

## 2025-07-26 PROCEDURE — 255N000002 HC RX 255 OP 636: Performed by: FAMILY MEDICINE

## 2025-07-26 PROCEDURE — 96374 THER/PROPH/DIAG INJ IV PUSH: CPT | Performed by: EMERGENCY MEDICINE

## 2025-07-26 RX ORDER — LISINOPRIL 10 MG/1
10 TABLET ORAL AT BEDTIME
Status: DISCONTINUED | OUTPATIENT
Start: 2025-07-26 | End: 2025-07-29 | Stop reason: HOSPADM

## 2025-07-26 RX ORDER — ACETAMINOPHEN 500 MG
1000 TABLET ORAL EVERY 4 HOURS
COMMUNITY

## 2025-07-26 RX ORDER — CALCIUM CARBONATE 500 MG/1
1000 TABLET, CHEWABLE ORAL 4 TIMES DAILY PRN
Status: DISCONTINUED | OUTPATIENT
Start: 2025-07-26 | End: 2025-07-29 | Stop reason: HOSPADM

## 2025-07-26 RX ORDER — LORAZEPAM 1 MG/1
1 TABLET ORAL ONCE
Status: COMPLETED | OUTPATIENT
Start: 2025-07-26 | End: 2025-07-26

## 2025-07-26 RX ORDER — CEFTRIAXONE 2 G/1
2 INJECTION, POWDER, FOR SOLUTION INTRAMUSCULAR; INTRAVENOUS EVERY 24 HOURS
Status: DISCONTINUED | OUTPATIENT
Start: 2025-07-27 | End: 2025-07-29 | Stop reason: HOSPADM

## 2025-07-26 RX ORDER — SIMVASTATIN 20 MG
20 TABLET ORAL AT BEDTIME
Status: DISCONTINUED | OUTPATIENT
Start: 2025-07-26 | End: 2025-07-29 | Stop reason: HOSPADM

## 2025-07-26 RX ORDER — OXYCODONE HYDROCHLORIDE 5 MG/1
10 TABLET ORAL ONCE
Refills: 0 | Status: COMPLETED | OUTPATIENT
Start: 2025-07-26 | End: 2025-07-26

## 2025-07-26 RX ORDER — IBUPROFEN 600 MG/1
600 TABLET, FILM COATED ORAL ONCE
Status: COMPLETED | OUTPATIENT
Start: 2025-07-26 | End: 2025-07-26

## 2025-07-26 RX ORDER — HYDROMORPHONE HYDROCHLORIDE 1 MG/ML
0.3 INJECTION, SOLUTION INTRAMUSCULAR; INTRAVENOUS; SUBCUTANEOUS ONCE
Status: COMPLETED | OUTPATIENT
Start: 2025-07-26 | End: 2025-07-26

## 2025-07-26 RX ORDER — DEXTROSE MONOHYDRATE 25 G/50ML
25-50 INJECTION, SOLUTION INTRAVENOUS
Status: DISCONTINUED | OUTPATIENT
Start: 2025-07-26 | End: 2025-07-29 | Stop reason: HOSPADM

## 2025-07-26 RX ORDER — IOPAMIDOL 755 MG/ML
90 INJECTION, SOLUTION INTRAVASCULAR ONCE
Status: COMPLETED | OUTPATIENT
Start: 2025-07-26 | End: 2025-07-26

## 2025-07-26 RX ORDER — GADOBUTROL 604.72 MG/ML
10 INJECTION INTRAVENOUS ONCE
Status: COMPLETED | OUTPATIENT
Start: 2025-07-26 | End: 2025-07-26

## 2025-07-26 RX ORDER — CEFTRIAXONE 2 G/1
2 INJECTION, POWDER, FOR SOLUTION INTRAMUSCULAR; INTRAVENOUS ONCE
Status: COMPLETED | OUTPATIENT
Start: 2025-07-26 | End: 2025-07-26

## 2025-07-26 RX ORDER — ACETAMINOPHEN 500 MG
1000 TABLET ORAL ONCE
Status: COMPLETED | OUTPATIENT
Start: 2025-07-26 | End: 2025-07-26

## 2025-07-26 RX ORDER — HYDROMORPHONE HYDROCHLORIDE 1 MG/ML
0.5 INJECTION, SOLUTION INTRAMUSCULAR; INTRAVENOUS; SUBCUTANEOUS
Refills: 0 | Status: COMPLETED | OUTPATIENT
Start: 2025-07-26 | End: 2025-07-26

## 2025-07-26 RX ORDER — BUPIVACAINE HYDROCHLORIDE 5 MG/ML
INJECTION, SOLUTION EPIDURAL; INTRACAUDAL; PERINEURAL
Status: COMPLETED | OUTPATIENT
Start: 2025-07-26 | End: 2025-07-26

## 2025-07-26 RX ORDER — ACETAMINOPHEN 500 MG
1000 TABLET ORAL 4 TIMES DAILY PRN
Status: DISCONTINUED | OUTPATIENT
Start: 2025-07-26 | End: 2025-07-29 | Stop reason: HOSPADM

## 2025-07-26 RX ORDER — NICOTINE POLACRILEX 4 MG
15-30 LOZENGE BUCCAL
Status: DISCONTINUED | OUTPATIENT
Start: 2025-07-26 | End: 2025-07-29 | Stop reason: HOSPADM

## 2025-07-26 RX ORDER — IBUPROFEN 200 MG
400 TABLET ORAL EVERY 6 HOURS
COMMUNITY

## 2025-07-26 RX ADMIN — GADOBUTROL 10 ML: 604.72 INJECTION INTRAVENOUS at 10:33

## 2025-07-26 RX ADMIN — CEFTRIAXONE 2 G: 2 INJECTION, POWDER, FOR SOLUTION INTRAMUSCULAR; INTRAVENOUS at 08:25

## 2025-07-26 RX ADMIN — BUPIVACAINE HYDROCHLORIDE 5 ML: 5 INJECTION, SOLUTION EPIDURAL; INTRACAUDAL; PERINEURAL at 13:29

## 2025-07-26 RX ADMIN — HYDROMORPHONE HYDROCHLORIDE 0.3 MG: 1 INJECTION, SOLUTION INTRAMUSCULAR; INTRAVENOUS; SUBCUTANEOUS at 09:35

## 2025-07-26 RX ADMIN — ACETAMINOPHEN 1000 MG: 500 TABLET, FILM COATED ORAL at 21:08

## 2025-07-26 RX ADMIN — LISINOPRIL 10 MG: 10 TABLET ORAL at 21:09

## 2025-07-26 RX ADMIN — BUPIVACAINE HYDROCHLORIDE 10 ML: 5 INJECTION, SOLUTION EPIDURAL; INTRACAUDAL; PERINEURAL at 13:26

## 2025-07-26 RX ADMIN — ACETAMINOPHEN 1000 MG: 500 TABLET, FILM COATED ORAL at 16:03

## 2025-07-26 RX ADMIN — BUPIVACAINE 10 ML: 13.3 INJECTION, SUSPENSION, LIPOSOMAL INFILTRATION at 13:28

## 2025-07-26 RX ADMIN — OXYCODONE HYDROCHLORIDE 10 MG: 5 TABLET ORAL at 03:11

## 2025-07-26 RX ADMIN — SODIUM CHLORIDE 70 ML: 9 INJECTION, SOLUTION INTRAVENOUS at 05:01

## 2025-07-26 RX ADMIN — LORAZEPAM 1 MG: 1 TABLET ORAL at 08:58

## 2025-07-26 RX ADMIN — SIMVASTATIN 20 MG: 20 TABLET, FILM COATED ORAL at 21:09

## 2025-07-26 RX ADMIN — IBUPROFEN 600 MG: 600 TABLET ORAL at 03:31

## 2025-07-26 RX ADMIN — METFORMIN HYDROCHLORIDE 1000 MG: 500 TABLET ORAL at 17:37

## 2025-07-26 RX ADMIN — IOPAMIDOL 90 ML: 755 INJECTION, SOLUTION INTRAVENOUS at 05:01

## 2025-07-26 RX ADMIN — ACETAMINOPHEN 1000 MG: 500 TABLET, FILM COATED ORAL at 03:31

## 2025-07-26 RX ADMIN — HYDROMORPHONE HYDROCHLORIDE 0.5 MG: 1 INJECTION, SOLUTION INTRAMUSCULAR; INTRAVENOUS; SUBCUTANEOUS at 09:10

## 2025-07-26 RX ADMIN — HYDROMORPHONE HYDROCHLORIDE 0.5 MG: 1 INJECTION, SOLUTION INTRAMUSCULAR; INTRAVENOUS; SUBCUTANEOUS at 06:22

## 2025-07-26 RX ADMIN — HYDROMORPHONE HYDROCHLORIDE 0.5 MG: 1 INJECTION, SOLUTION INTRAMUSCULAR; INTRAVENOUS; SUBCUTANEOUS at 11:58

## 2025-07-26 ASSESSMENT — ACTIVITIES OF DAILY LIVING (ADL)
ADLS_ACUITY_SCORE: 41
ADLS_ACUITY_SCORE: 24
ADLS_ACUITY_SCORE: 41
ADLS_ACUITY_SCORE: 15
ADLS_ACUITY_SCORE: 15
ADLS_ACUITY_SCORE: 24
ADLS_ACUITY_SCORE: 41
ADLS_ACUITY_SCORE: 19
ADLS_ACUITY_SCORE: 41
ADLS_ACUITY_SCORE: 41
ADLS_ACUITY_SCORE: 20
ADLS_ACUITY_SCORE: 24
ADLS_ACUITY_SCORE: 15
ADLS_ACUITY_SCORE: 24
ADLS_ACUITY_SCORE: 41
ADLS_ACUITY_SCORE: 24
ADLS_ACUITY_SCORE: 24
ADLS_ACUITY_SCORE: 15
ADLS_ACUITY_SCORE: 24

## 2025-07-26 ASSESSMENT — COLUMBIA-SUICIDE SEVERITY RATING SCALE - C-SSRS
6. HAVE YOU EVER DONE ANYTHING, STARTED TO DO ANYTHING, OR PREPARED TO DO ANYTHING TO END YOUR LIFE?: NO
2. HAVE YOU ACTUALLY HAD ANY THOUGHTS OF KILLING YOURSELF IN THE PAST MONTH?: NO
1. IN THE PAST MONTH, HAVE YOU WISHED YOU WERE DEAD OR WISHED YOU COULD GO TO SLEEP AND NOT WAKE UP?: NO

## 2025-07-26 NOTE — ANESTHESIA CARE TRANSFER NOTE
Patient: Osmar Angel    Procedure: * No procedures listed *       Diagnosis: * No pre-op diagnosis entered *  Diagnosis Additional Information: No value filed.    Anesthesia Type:   No value filed.     Note:    Oropharynx: oropharynx clear of all foreign objects  Level of Consciousness: awake  Oxygen Supplementation: room air    Independent Airway: airway patency satisfactory and stable  Dentition: dentition unchanged  Vital Signs Stable: post-procedure vital signs reviewed and stable  Report to RN Given: handoff report given  Patient transferred to: Medical/Surgical Unit  Comments: No sedation given, Pain improved and patient states his shoulder is numb.   Handoff Report: Identifed the Patient, Identified the Reponsible Provider, Reviewed the pertinent medical history, Discussed the surgical course, Reviewed Intra-OP anesthesia mangement and issues during anesthesia, Set expectations for post-procedure period and Allowed opportunity for questions and acknowledgement of understanding      Vitals:  Vitals Value Taken Time   BP     Temp     Pulse     Resp     SpO2         Electronically Signed By: NIURKA Gamble CRNA  July 26, 2025  2:38 PM

## 2025-07-26 NOTE — CONSULTS
ORTHO CONSULT    REASON FOR CONSULTATION: Dr. Lynch requested orthopaedic consultation regarding left shoulder pain    HPI: Price is a 55-year-old right-hand-dominant male here today for left shoulder pain.  He states his left shoulder pain started atraumatically a couple days ago.  He tested positive for gonorrhea and chlamydia in April 2025.  He did a trip to the [facility].  He had a tick bite and although no rash she had multiple arthralgias and was treated with doxycycline on 7/23/2025.  Unfortunate, over the last couple days, pain in his left shoulder has gotten substantially worse.  He woke up last night because of significant pain.  Came to the ER.  In the ER, CT scan showed an AC joint effusion.  Clinically, he appeared to have a lot of pain with any active or passive range of motion of his glenohumeral joint.  Therefore, his ER joint was tapped and there was minimal fluid in the joint.  His inflammatory markers have been substantially elevated.  He was admitted to the medicine service started on ceftriaxone    ROS: A 10 pt ROS was obtained and negative except as mentioned in the HPI    ALLERGIES: NKDA    MEDICATIONS:   Current Facility-Administered Medications   Medication Dose Route Frequency Provider Last Rate Last Admin    calcium carbonate (TUMS) chewable tablet 1,000 mg  1,000 mg Oral 4x Daily PRN Emery Lynch MD        HYDROmorphone (PF) (DILAUDID) injection 0.5 mg  0.5 mg Intravenous Q15 Min PRN Missael Rose MD   0.5 mg at 07/26/25 0910       PMH:  Past Medical History:   Diagnosis Date    Amblyopia, unspecified     Myopia     Profound impairment, one eye, impairment level not further specified     left eye loss of direct vision    Sprain of ankle, unspecified site        SOCIAL HISTORY: Drives a truck for [employer]    FAMILY HISTORY: Negative for bleeding disorders, clotting disorders, or adverse reactions to anesthesia    EXAM:  General: Pleasant, oriented, up walking  around  Neuro: CN II-XII intact  Eyes: Extraocular movements intact  Skin:  No rashes or lesions  Resp:  Nonlabored breathing  CV: Peripheral pulses regular  Abd:  Soft, NT, ND  Lymph: No localized lympadenopathy  MSK: Holds arm at side.  Significant pain with any passive or active motion of the glenohumeral joint.  AC joint markedly tender to palpation    IMAGING: X-rays of the shoulder reviewed.  These show well-maintained joint space without acute trauma or soft tissue calcifications.  CT scan and MRI of the shoulder also reviewed.  In sum, these show reactive edema in the distal clavicle with soft tissue swelling around the AC joint.  He also has a full-thickness tear of the rotator cuff.  There is no large glenohumeral joint effusion, he does have mild grade 2 chondral changes on both sides of the joint    LAB:    on 7/26              81 on 7/23    ASSESSMENT:    1. Left shoulder pain - concern for both septic glenohumeral joint as well as septic AC joint   2. Recent tick bite with concerns for Lyme's disease (July 2025) and Gonorrhea/Chlamydia (April 2025)   3. Comorbidities including T2DM with an A1c of 8    PLAN:   1. Findings discussed with Price and his wife.  Clinically, he appears to have a septic shoulder.  They tried to aspirate his joint this morning in the ER although did not get much.  I recommend moving forward with an arthroscopic irrigation and debridement.  Will also take multiple cultures.  He is also quite tender over his AC joint and has swelling and edema around his AC joint on the CT scan.  I am concerned that this may be infected as well.  Therefore, we will plan on performing an open I&D with distal clavicle resection of his AC joint as well.  He had breakfast this morning, and therefore we will plan surgery tomorrow morning.  He should stay NPO after midnight.    Jarvis Miles MD

## 2025-07-26 NOTE — ANESTHESIA PREPROCEDURE EVALUATION
Anesthesia Pre-Procedure Evaluation    Patient: Osmar Angel   MRN: [id number] : 1970          Procedure : Procedure(s):  IRRIGATION AND DEBRIDEMENT, SHOULDER  ARTHROSCOPY, SHOULDER, WITH DISTAL CLAVICLE EXCISION         Past Medical History:   Diagnosis Date    Amblyopia, unspecified     Myopia     Profound impairment, one eye, impairment level not further specified     left eye loss of direct vision    Sprain of ankle, unspecified site       Past Surgical History:   Procedure Laterality Date    ROTATOR CUFF REPAIR RT/LT      right arthroscopic    SURGICAL HISTORY OF -       right knee arthroscopy    SURGICAL HISTORY OF -       umbilical hernia repair    VASECTOMY      ZZC REPAIR ING HERNIA, INFANT,REDUC      bilateral      Allergies   Allergen Reactions    Wellbutrin [Bupropion Hcl] Swelling      Social History     Tobacco Use    Smoking status: Former     Current packs/day: 0.00     Types: Cigarettes     Quit date: 2005     Years since quittin.4    Smokeless tobacco: Never   Substance Use Topics    Alcohol use: Yes     Alcohol/week: 0.0 standard drinks of alcohol     Comment: rarely      Wt Readings from Last 1 Encounters:   25 103 kg (227 lb)        Anesthesia Evaluation   Pt has had prior anesthetic. Type: MAC and General.        ROS/MED HX  ENT/Pulmonary:     (+) sleep apnea, uses CPAP,                                      Neurologic:  - neg neurologic ROS     Cardiovascular:     (+) Dyslipidemia hypertension- -   -  - -                                      METS/Exercise Tolerance:     Hematologic:       Musculoskeletal: Comment: Effusion of acromioclavicular joint, left      GI/Hepatic:       Renal/Genitourinary:       Endo:     (+)  type II DM,                    Psychiatric/Substance Use:  - neg psychiatric ROS     Infectious Disease: Comment: Infection of left AC joint per surgeon.      Malignancy:  - neg malignancy ROS     Other:  - neg other ROS            Physical  "Exam  Airway  Mallampati: II  TM distance: >3 FB  Neck ROM: full  Mouth opening: >= 4 cm    Cardiovascular - normal exam  Rhythm: regular  Rate: normal rate     Dental   (+) Minor Abnormalities - some fillings, tiny chips      Pulmonary - normal examBreath sounds clear to auscultation        Neurological - normal exam  He appears awake, alert and oriented x3.    Other Findings       OUTSIDE LABS:  CBC:   Lab Results   Component Value Date    WBC 11.5 (H) 07/26/2025    WBC 10.9 07/23/2025    HGB 13.8 07/26/2025    HGB 14.9 07/23/2025    HCT 41.1 07/26/2025    HCT 44.9 07/23/2025     07/26/2025     07/23/2025     BMP:   Lab Results   Component Value Date     (L) 07/26/2025     07/23/2025    POTASSIUM 4.4 07/26/2025    POTASSIUM 5.0 07/23/2025    CHLORIDE 98 07/26/2025    CHLORIDE 101 07/23/2025    CO2 21 (L) 07/26/2025    CO2 26 07/23/2025    BUN 18.2 07/26/2025    BUN 17.7 07/23/2025    CR 0.76 07/26/2025    CR 0.89 07/23/2025     (H) 07/26/2025     (H) 07/23/2025     COAGS: No results found for: \"PTT\", \"INR\", \"FIBR\"  POC:   Lab Results   Component Value Date     (H) 07/25/2011     HEPATIC:   Lab Results   Component Value Date    ALBUMIN 4.2 07/23/2025    PROTTOTAL 7.2 07/23/2025    ALT 20 07/23/2025    AST 25 07/23/2025    ALKPHOS 46 07/23/2025    BILITOTAL 0.5 07/23/2025     OTHER:   Lab Results   Component Value Date    A1C 6.6 (H) 07/23/2025    ROOPA 9.5 07/26/2025    TSH 1.10 12/05/2018    SED 14 07/23/2025       Anesthesia Plan    ASA Status:  3       Anesthesia Type: Peripheral Nerve Block.   Techniques and Equipment:       - Monitoring Plan: standard ASA monitoring     Consents    Anesthesia Plan(s) and associated risks, benefits, and realistic alternatives discussed. Questions answered and patient/representative(s) expressed understanding.     - Discussed:     - Discussed with:  Patient               Postoperative Care    Pain management: peripheral nerve block. " "    Comments:                   NIURKA Gamble CRNA    I have reviewed the pertinent notes and labs in the chart from the past 30 days and (re)examined the patient.  Any updates or changes from those notes are reflected in this note.    Clinically Significant Risk Factors Present on Admission         # Hyponatremia: Lowest Na = 133 mmol/L in last 2 days, will monitor as appropriate         # Drug Induced Platelet Defect: home medication list includes an antiplatelet medication   # Hypertension: Noted on problem list          # DMII: A1C = 6.6 % (Ref range: 0.0 - 5.6 %) within past 6 months    # Overweight: Estimated body mass index is 29.95 kg/m  as calculated from the following:    Height as of 7/23/25: 1.854 m (6' 1\").    Weight as of this encounter: 103 kg (227 lb).                    "

## 2025-07-26 NOTE — PROGRESS NOTES
Progressing. PRN Dilaudid given with relief. Shoulder block placed. I&D 7/27. Able to make needs known.

## 2025-07-26 NOTE — ED PROVIDER NOTES
Emergency Department Patient Sign-out       Brief HPI:  This is a 55 year old male signed out to me by Dr. Rose.  See initial ED Provider note for details of the presentation.     Presenting with progressive shoulder pain and disuse over the last 5 days.  Diffuse myalgias and it had been attributed to acute Lyme's and was treated with doxycycline on day 3-4 now.  Had traveled to the [facility].  A tickborne panel was performed several days ago and was returned is negative.  This was a PCR panel.  No microscopy done.  CRP in the meantime is been increasing from 80 now up to 160.  He is afebrile but pain has become significantly worse despite being on ibuprofen and Tylenol scheduled around-the-clock.  He continues to have no fever.  There is been no rash or erythema migrans type rash.  His pain is focal to the AC joint and underwent CT today.  No obvious joint effusion no overlying erythema.   However he has very minimal range of motion of the joint.       Significant Events prior to my assuming care:       Exam:   Patient Vitals for the past 24 hrs:   BP Temp Temp src Pulse Resp SpO2 Weight   07/26/25 0630 (!) 140/88 -- -- 90 18 94 % --   07/26/25 0515 (!) 143/89 -- -- 90 18 98 % --   07/26/25 0229 (!) 163/110 98.1  F (36.7  C) Oral 103 18 97 % 103 kg (227 lb)           ED RESULTS:   Results for orders placed or performed during the hospital encounter of 07/26/25 (from the past 24 hours)   Basic Metabolic Panel (Limited Occurrences)     Status: Abnormal    Collection Time: 07/26/25  3:54 AM   Result Value Ref Range    Sodium 133 (L) 135 - 145 mmol/L    Potassium 4.4 3.4 - 5.3 mmol/L    Chloride 98 98 - 107 mmol/L    Carbon Dioxide (CO2) 21 (L) 22 - 29 mmol/L    Anion Gap 14 7 - 15 mmol/L    Urea Nitrogen 18.2 6.0 - 20.0 mg/dL    Creatinine 0.76 0.67 - 1.17 mg/dL    GFR Estimate >90 >60 mL/min/1.73m2    Calcium 9.5 8.8 - 10.4 mg/dL    Glucose 143 (H) 70 - 99 mg/dL   CBC with Platelets and Differential  (Limited Occurrences)     Status: Abnormal    Collection Time: 07/26/25  3:54 AM    Narrative    The following orders were created for panel order CBC with Platelets and Differential (Limited Occurrences).  Procedure                               Abnormality         Status                     ---------                               -----------         ------                     CBC with platelets and ...[[id number]]  Abnormal            Final result                 Please view results for these tests on the individual orders.   CK total     Status: Normal    Collection Time: 07/26/25  3:54 AM   Result Value Ref Range    CK 79 39 - 308 U/L   CRP Inflammation     Status: Abnormal    Collection Time: 07/26/25  3:54 AM   Result Value Ref Range    CRP Inflammation 167.27 (H) <5.00 mg/L   CBC with platelets and differential     Status: Abnormal    Collection Time: 07/26/25  3:54 AM   Result Value Ref Range    WBC Count 11.5 (H) 4.0 - 11.0 10e3/uL    RBC Count 4.66 4.40 - 5.90 10e6/uL    Hemoglobin 13.8 13.3 - 17.7 g/dL    Hematocrit 41.1 40.0 - 53.0 %    MCV 88 78 - 100 fL    MCH 29.6 26.5 - 33.0 pg    MCHC 33.6 31.5 - 36.5 g/dL    RDW 13.2 10.0 - 15.0 %    Platelet Count 240 150 - 450 10e3/uL    % Neutrophils 67 %    % Lymphocytes 17 %    % Monocytes 13 %    % Eosinophils 3 %    % Basophils 0 %    % Immature Granulocytes 0 %    NRBCs per 100 WBC 0 <1 /100    Absolute Neutrophils 7.7 1.6 - 8.3 10e3/uL    Absolute Lymphocytes 2.0 0.8 - 5.3 10e3/uL    Absolute Monocytes 1.5 (H) 0.0 - 1.3 10e3/uL    Absolute Eosinophils 0.3 0.0 - 0.7 10e3/uL    Absolute Basophils 0.0 0.0 - 0.2 10e3/uL    Absolute Immature Granulocytes 0.0 <=0.4 10e3/uL    Absolute NRBCs 0.0 10e3/uL   XR Shoulder Left 2 Views     Status: None    Collection Time: 07/26/25  4:20 AM    Narrative    EXAM: XR SHOULDER LEFT 2 VIEWS  LOCATION: [facility]  DATE: 7/26/2025    INDICATION: severe pain in shoulder with localized  swelling  COMPARISON: None.      Impression    IMPRESSION: Normal joint spaces and alignment. No fracture.   CT Shoulder Left w Contrast     Status: None    Collection Time: 07/26/25  5:38 AM    Narrative    EXAM: CT SHOULDER LEFT W CONTRAST  LOCATION: [facility]  DATE: 7/26/2025    INDICATION: Pain out of proportion around the left shoulder. Shoulder pain for about 5 days without any known antecedent trauma. Abnormally elevated, rising CRP level.  COMPARISON: None.  TECHNIQUE: IV contrast. Axial, sagittal and coronal thin-section reconstruction. Dose reduction techniques were used.   CONTRAST: 90mL Isovue 370    FINDINGS:  There is distention and synovial enhancement at acromioclavicular joint with subcutaneous edema over the cephalad aspect of the shoulder centered around the acromioclavicular joint and superficial to the normal-appearing distal trapezius and proximal   deltoid muscles. There is minimal acromioclavicular joint degenerative change but no demineralization or erosion. No soft tissue gas or focal fluid collection. No glenohumeral or subacromial bursal effusion.    The rotator cuff and other shoulder musculature appears normal. The scapula, humerus, and other visualized bones of the upper left thoracic cage appear normal.    No lymphadenopathy or soft tissue gas. The visualized left lung is clear. Moderately severe left main coronary artery calcification.      Impression    IMPRESSION:  1.  Acromioclavicular joint effusion with synovial enhancement and overlying soft tissue edema. This may represent septic arthritis or inflammatory arthritis. There is no evidence of osteomyelitis or abscess.  2.  Coronary artery atheromatous calcification.           ED MEDICATIONS:   Medications   HYDROmorphone (PF) (DILAUDID) injection 0.5 mg (0.5 mg Intravenous $Given 7/26/25 0607)   oxyCODONE (ROXICODONE) tablet 10 mg (10 mg Oral $Given 7/26/25 0311)   ibuprofen (ADVIL/MOTRIN) tablet 600  mg (600 mg Oral $Given 7/26/25 0331)   acetaminophen (TYLENOL) tablet 1,000 mg (1,000 mg Oral $Given 7/26/25 0331)   iopamidol (ISOVUE-370) solution 90 mL (90 mLs Intravenous $Given 7/26/25 0501)   sodium chloride 0.9 % bag for CT scan flush (70 mLs Intravenous $Given 7/26/25 0501)         Impression:    ICD-10-CM    1. Effusion of acromioclavicular joint, left  M25.412       2. Acute pain of left shoulder  M25.512           Plan:    Pending studies include none.      After informed consent today I performed a shoulder arthrocentesis right side.  We discussed the potential risks of infection and bleeding and the pain related to the procedure.  I then used a 1% lidocaine with epinephrine and bupivacaine mix 0.25% to inject first into the joint and then followed this with an 18-gauge needle into the joint and aspirated only a few drops of fluid.  Ultrasound was used first to identify whether there was a pocket of fluid that could be aspirated.  The remainder of the procedure was uncomplicated and I did dilute the few drops of fluid and saline and then placed it in a culture bottle.  This will be sent for Gram stain and culture.    Orthopedics has been paged regarding his findings.  I spoke with Dr. William Miles.  Agreed that hospital stay appropriate and further evaluation.  Would recommend MRI and will place a consultation for them to see the patient.         ED to Inpatient Handoff:    Discussed with dr. elias   Patient accepted for Inpatient Stay  Pending studies include gram stain/culture  Code Status: Full Code         In reviewing the record with Dr. Elias, history of prior GC and chlamydia was identified.  Dirty urine collected as well as other cultures and given an empiric dose of ceftriaxone while awaiting repeat testing.        MD Syed Pemberton Scott J, MD  07/26/25 0300       Emery Lynch MD  07/26/25 6801

## 2025-07-26 NOTE — PLAN OF CARE
[place] [facility] ADMISSION NOTE    Patient admitted to room 2209 at approximately 1010 via wheel chair from emergency room. Patient was accompanied by spouse.     Verbal SBAR report received from Maira MARTINEZ RN prior to patient arrival.     Patient ambulated to bed independently. Patient alert and oriented X 4. Pain is controlled with current analgesics.  Medication(s) being used: see MAR.  . Admission vital signs: Blood pressure (!) 140/88, pulse 90, temperature 98.1  F (36.7  C), temperature source Oral, resp. rate 18, weight 103 kg (227 lb), SpO2 94%. Patient was oriented to plan of care, call light, bed controls, tv, telephone, bathroom, and visiting hours.     Risk Assessment    The following safety risks were identified during admission: none. Yellow risk band applied: YES.     Skin Initial Assessment    This writer admitted this patient and completed a full skin assessment and Rob score in the Adult PCS flowsheet.   Photo documentation of skin problem and/or wound competed via [employer] application (located under Media):  N/A    Appropriate interventions initiated as needed.     Secondary skin check completed by Patient refused.    Rob Risk Assessment  Sensory Perception: 4-->no impairment  Moisture: 4-->rarely moist  Activity: 3-->walks occasionally  Mobility: 3-->slightly limited  Nutrition: 3-->adequate  Friction and Shear: 3-->no apparent problem  Rob Score: 20  Mattress: Standard gel/foam mattress (IsoFlex, Atmos Air, etc.)  Bed Frame: Standard width and length    Education    Patient has a [place] to Observation order: No  Observation education completed and documented: N/A      Julia Arreola RN

## 2025-07-26 NOTE — ED NOTES
[facility]   Admission Handoff    The patient is Osmar Angel, 55 year old who arrived in the ED by CAR from home with a complaint of Shoulder Pain  . The patient's current symptoms are new and during this time the symptoms have decreased. In the ED, patient was diagnosed with   Final diagnoses:   Effusion of acromioclavicular joint, left   Acute pain of left shoulder   Monoarthritis, shoulder region, left         Needed?: No    Allergies:    Allergies   Allergen Reactions    Wellbutrin [Bupropion Hcl] Swelling       Past Medical Hx:   Past Medical History:   Diagnosis Date    Amblyopia, unspecified     Myopia     Profound impairment, one eye, impairment level not further specified     left eye loss of direct vision    Sprain of ankle, unspecified site        Initial vitals were: BP: (!) 163/110  Pulse: 103  Temp: 98.1  F (36.7  C)  Resp: 18  Weight: 103 kg (227 lb)  SpO2: 97 %   Recent vital Signs: BP (!) 140/88   Pulse 90   Temp 98.1  F (36.7  C) (Oral)   Resp 18   Wt 103 kg (227 lb)   SpO2 94%   BMI 29.95 kg/m      Elimination Status: Continent: Yes     Activity Level: Independent    Fall Status: Reason for falls risk: High Risk Medications  nonskid shoes/slippers when out of bed, arm band in place, and patient and family education    Baseline Mental status: WDL  Current Mental Status changes: at basesline    Infection present or suspected this encounter: yes other tick born/septic joint  Sepsis suspected: No    Isolation type: none    Bariatric equipment needed?: No    In the ED these meds were given:   Medications   HYDROmorphone (PF) (DILAUDID) injection 0.5 mg (0.5 mg Intravenous $Given 7/26/25 5349)   cefTRIAXone (ROCEPHIN) 2 g vial to attach to  ml bag for ADULTS or NS 50 ml bag for PEDS (2 g Intravenous $New Bag 7/26/25 1876)   calcium carbonate (TUMS) chewable tablet 1,000 mg (has no administration in time range)   oxyCODONE (ROXICODONE) tablet 10 mg  (10 mg Oral $Given 7/26/25 0311)   ibuprofen (ADVIL/MOTRIN) tablet 600 mg (600 mg Oral $Given 7/26/25 0331)   acetaminophen (TYLENOL) tablet 1,000 mg (1,000 mg Oral $Given 7/26/25 0331)   iopamidol (ISOVUE-370) solution 90 mL (90 mLs Intravenous $Given 7/26/25 0501)   sodium chloride 0.9 % bag for CT scan flush (70 mLs Intravenous $Given 7/26/25 0501)       Drips running?  No    Home pump  No    Current LDAs: Peripheral IV: Site R forearm; Gauge 20  none     Results:   Labs/Imaging  Ordered and Resulted from Time of ED Arrival Up to the Time of Departure from the ED  Recent Results (from the past 24 hours)   Basic Metabolic Panel (Limited Occurrences)   Result Value Ref Range    Sodium 133 (L) 135 - 145 mmol/L    Potassium 4.4 3.4 - 5.3 mmol/L    Chloride 98 98 - 107 mmol/L    Carbon Dioxide (CO2) 21 (L) 22 - 29 mmol/L    Anion Gap 14 7 - 15 mmol/L    Urea Nitrogen 18.2 6.0 - 20.0 mg/dL    Creatinine 0.76 0.67 - 1.17 mg/dL    GFR Estimate >90 >60 mL/min/1.73m2    Calcium 9.5 8.8 - 10.4 mg/dL    Glucose 143 (H) 70 - 99 mg/dL   CBC with Platelets and Differential (Limited Occurrences)    Narrative    The following orders were created for panel order CBC with Platelets and Differential (Limited Occurrences).  Procedure                               Abnormality         Status                     ---------                               -----------         ------                     CBC with platelets and ...[[id number]]  Abnormal            Final result                 Please view results for these tests on the individual orders.   CK total   Result Value Ref Range    CK 79 39 - 308 U/L   CRP Inflammation   Result Value Ref Range    CRP Inflammation 167.27 (H) <5.00 mg/L   CBC with platelets and differential   Result Value Ref Range    WBC Count 11.5 (H) 4.0 - 11.0 10e3/uL    RBC Count 4.66 4.40 - 5.90 10e6/uL    Hemoglobin 13.8 13.3 - 17.7 g/dL    Hematocrit 41.1 40.0 - 53.0 %    MCV 88 78 - 100 fL    MCH 29.6 26.5 -  33.0 pg    MCHC 33.6 31.5 - 36.5 g/dL    RDW 13.2 10.0 - 15.0 %    Platelet Count 240 150 - 450 10e3/uL    % Neutrophils 67 %    % Lymphocytes 17 %    % Monocytes 13 %    % Eosinophils 3 %    % Basophils 0 %    % Immature Granulocytes 0 %    NRBCs per 100 WBC 0 <1 /100    Absolute Neutrophils 7.7 1.6 - 8.3 10e3/uL    Absolute Lymphocytes 2.0 0.8 - 5.3 10e3/uL    Absolute Monocytes 1.5 (H) 0.0 - 1.3 10e3/uL    Absolute Eosinophils 0.3 0.0 - 0.7 10e3/uL    Absolute Basophils 0.0 0.0 - 0.2 10e3/uL    Absolute Immature Granulocytes 0.0 <=0.4 10e3/uL    Absolute NRBCs 0.0 10e3/uL   XR Shoulder Left 2 Views    Narrative    EXAM: XR SHOULDER LEFT 2 VIEWS  LOCATION: [facility]  DATE: 7/26/2025    INDICATION: severe pain in shoulder with localized swelling  COMPARISON: None.      Impression    IMPRESSION: Normal joint spaces and alignment. No fracture.   CT Shoulder Left w Contrast    Narrative    EXAM: CT SHOULDER LEFT W CONTRAST  LOCATION: [facility]  DATE: 7/26/2025    INDICATION: Pain out of proportion around the left shoulder. Shoulder pain for about 5 days without any known antecedent trauma. Abnormally elevated, rising CRP level.  COMPARISON: None.  TECHNIQUE: IV contrast. Axial, sagittal and coronal thin-section reconstruction. Dose reduction techniques were used.   CONTRAST: 90mL Isovue 370    FINDINGS:  There is distention and synovial enhancement at acromioclavicular joint with subcutaneous edema over the cephalad aspect of the shoulder centered around the acromioclavicular joint and superficial to the normal-appearing distal trapezius and proximal   deltoid muscles. There is minimal acromioclavicular joint degenerative change but no demineralization or erosion. No soft tissue gas or focal fluid collection. No glenohumeral or subacromial bursal effusion.    The rotator cuff and other shoulder musculature appears normal. The scapula, humerus, and other  visualized bones of the upper left thoracic cage appear normal.    No lymphadenopathy or soft tissue gas. The visualized left lung is clear. Moderately severe left main coronary artery calcification.      Impression    IMPRESSION:  1.  Acromioclavicular joint effusion with synovial enhancement and overlying soft tissue edema. This may represent septic arthritis or inflammatory arthritis. There is no evidence of osteomyelitis or abscess.  2.  Coronary artery atheromatous calcification.       UA with Microscopic reflex to Culture    Specimen: Urine, Clean Catch   Result Value Ref Range    Color Urine Light Yellow Colorless, Straw, Light Yellow, Yellow    Appearance Urine Clear Clear    Glucose Urine Negative Negative mg/dL    Bilirubin Urine Negative Negative    Ketones Urine Trace (A) Negative mg/dL    Specific Gravity Urine 1.015 1.003 - 1.035    Blood Urine Negative Negative    pH Urine 6.0 5.0 - 7.0    Protein Albumin Urine 10 (A) Negative mg/dL    Urobilinogen Urine Normal Normal mg/dL    Nitrite Urine Negative Negative    Leukocyte Esterase Urine Negative Negative    RBC Urine 1 <=2 /HPF    WBC Urine 1 <=5 /HPF    Narrative    Urine Culture not indicated       For the majority of the shift this patient's behavior was Green     Cardiac Rhythm: Normal Sinus  Pt needs tele? No  Skin/wound Issues: None    Code Status: Full Code    Pain control: good    Nausea control: pt had none    Abnormal labs/tests/findings requiring intervention: none    Patient tested for COVID 19 prior to admission: NO     OBS brochure/video discussed/provided to patient/family: N/A     Family present during ED course? Yes     Family Comments/Social Situation comments: none    Tasks needing completion: MRI- paperwork is done.     Maira Hunter RN

## 2025-07-26 NOTE — ANESTHESIA PREPROCEDURE EVALUATION
Anesthesia Pre-Procedure Evaluation    Patient: Osmar Angel   MRN: [id number] : 1970          Procedure : Procedure(s):  IRRIGATION AND DEBRIDEMENT, SHOULDER  ARTHROSCOPY, SHOULDER, WITH DISTAL CLAVICLE EXCISION         Past Medical History:   Diagnosis Date    Amblyopia, unspecified     Myopia     Profound impairment, one eye, impairment level not further specified     left eye loss of direct vision    Sprain of ankle, unspecified site       Past Surgical History:   Procedure Laterality Date    ROTATOR CUFF REPAIR RT/LT      right arthroscopic    SURGICAL HISTORY OF -       right knee arthroscopy    SURGICAL HISTORY OF -       umbilical hernia repair    VASECTOMY      ZZC REPAIR ING HERNIA, INFANT,REDUC      bilateral      Allergies   Allergen Reactions    Wellbutrin [Bupropion Hcl] Swelling      Social History     Tobacco Use    Smoking status: Former     Current packs/day: 0.00     Types: Cigarettes     Quit date: 2005     Years since quittin.4    Smokeless tobacco: Never   Substance Use Topics    Alcohol use: Yes     Alcohol/week: 0.0 standard drinks of alcohol     Comment: rarely      Wt Readings from Last 1 Encounters:   25 103 kg (227 lb)        Anesthesia Evaluation   Pt has had prior anesthetic. Type: MAC and General.        ROS/MED HX  ENT/Pulmonary:     (+) sleep apnea,                                       Neurologic:       Cardiovascular:     (+) Dyslipidemia hypertension- -   -  - -                                      METS/Exercise Tolerance:     Hematologic:       Musculoskeletal: Comment: Effusion of acromioclavicular joint, left      GI/Hepatic:       Renal/Genitourinary:       Endo:     (+)  type II DM,                    Psychiatric/Substance Use:       Infectious Disease: Comment: Recent Tick bite treated with Doxycycline and recent venereal disease.       Malignancy:       Other:              Physical Exam  Airway  Mallampati: II  TM distance: >3 FB  Neck  "ROM: full  Mouth opening: >= 4 cm    Cardiovascular - normal exam  Rhythm: regular  Rate: normal rate     Dental   (+) Minor Abnormalities - some fillings, tiny chips      Pulmonary - normal examBreath sounds clear to auscultation        Neurological - normal exam  He appears awake, alert and oriented x3.    Other Findings       OUTSIDE LABS:  CBC:   Lab Results   Component Value Date    WBC 11.5 (H) 07/26/2025    WBC 10.9 07/23/2025    HGB 13.8 07/26/2025    HGB 14.9 07/23/2025    HCT 41.1 07/26/2025    HCT 44.9 07/23/2025     07/26/2025     07/23/2025     BMP:   Lab Results   Component Value Date     (L) 07/26/2025     07/23/2025    POTASSIUM 4.4 07/26/2025    POTASSIUM 5.0 07/23/2025    CHLORIDE 98 07/26/2025    CHLORIDE 101 07/23/2025    CO2 21 (L) 07/26/2025    CO2 26 07/23/2025    BUN 18.2 07/26/2025    BUN 17.7 07/23/2025    CR 0.76 07/26/2025    CR 0.89 07/23/2025     (H) 07/26/2025     (H) 07/23/2025     COAGS: No results found for: \"PTT\", \"INR\", \"FIBR\"  POC:   Lab Results   Component Value Date     (H) 07/25/2011     HEPATIC:   Lab Results   Component Value Date    ALBUMIN 4.2 07/23/2025    PROTTOTAL 7.2 07/23/2025    ALT 20 07/23/2025    AST 25 07/23/2025    ALKPHOS 46 07/23/2025    BILITOTAL 0.5 07/23/2025     OTHER:   Lab Results   Component Value Date    A1C 6.6 (H) 07/23/2025    ROOPA 9.5 07/26/2025    TSH 1.10 12/05/2018    SED 14 07/23/2025       Anesthesia Plan    ASA Status:  3, emergent      NPO Status: NPO Appropriate   Anesthesia Type: General.  Airway: oral, supraglottic airway.  Induction: intravenous.  Maintenance: Balanced.   Techniques and Equipment:     - Airway:  Planned airway equipment includes supraglottic airway.     - Monitoring Plan: standard ASA monitoring     Consents    Anesthesia Plan(s) and associated risks, benefits, and realistic alternatives discussed. Questions answered and patient/representative(s) expressed understanding.     " "- Discussed:     - Discussed with:  Patient        - Pt is DNR/DNI Status: no DNR     Blood Consent:      - Discussed with: patient.     - Consented: consented to blood products     Postoperative Care    Pain management: plan for postoperative opioid use, peripheral nerve block, multimodal analgesia.     Comments:                   NIURKA Gamble CRNA    I have reviewed the pertinent notes and labs in the chart from the past 30 days and (re)examined the patient.  Any updates or changes from those notes are reflected in this note.    Clinically Significant Risk Factors Present on Admission         # Hyponatremia: Lowest Na = 133 mmol/L in last 2 days, will monitor as appropriate         # Drug Induced Platelet Defect: home medication list includes an antiplatelet medication   # Hypertension: Noted on problem list          # DMII: A1C = 6.6 % (Ref range: 0.0 - 5.6 %) within past 6 months    # Overweight: Estimated body mass index is 29.95 kg/m  as calculated from the following:    Height as of 7/23/25: 1.854 m (6' 1\").    Weight as of this encounter: 103 kg (227 lb).                    "

## 2025-07-26 NOTE — H&P
[facility]    History and Physical  Hospital Medicine       Date of Admission:  7/26/2025  Date of Service: 7/26/2025     Assessment & Plan   Osmar Angel is a 55 year old male with past medical history significant for diabetes mellitus type 2, hypertension, hyperlipidemia, VERONA who now presents on 7/26/2025 with 5 days of progressive left shoulder pain.    Acute left shoulder arthritis, suspect septic     Progressive severe pain with ROM of left shoulder over 5 days associated with multiple joint arthralgias, transient swelling of right wrist which resolved, malaise and generalized weakness without fever or chills. No known trauma. No gout history. Seen at OSH clinic/ED and treated empirically with doxycycline for possible Lyme disease (negative initial serology) and negative tick PCR panel. No improvement after 3 days of doxycycline. , WBC 11.5. CT left shoulder showed AC joint effusion with synovial enhancement. Arthrocentesis of the GH joint in the ED produced only a few drops of fluid for culture, not enough for also doing crystal analysis.     Orthopedic surgery recommended MRI of the shoulder which showed soft tissue inflammatory changes but no large effusion, and posterior supraspinatus tear. Orthopedic surgery consulted and given the exquisite pain with any ROM of the shoulder, recommends arthroscopic surgery for wash out in AM 7/27.     Brachial plexus block placed by CRNA with good analgesic effect.    DDx includes septic arthritis, inflammatory arthritis, and crystal induced arthritis. Urine GC PCR sent. Ceftriaxone 2g started.    - continue ceftriaxone 2g IV q24h   - OR in AM    Preop eval  RCRI Risk Assessment:   Anesthesia issues: No  Baseline Activity: >4 METS (1 self-care, 4 flight of stairs, >4 heavy house work)  Chest Pain: no  Shortness of breath: no  Cardiac Risk Factors/Assessment:                High Risk Surgery: no (Ex: vascular, open intraperitoneal,  "intrathoracic)              History Ischemic Heart Disease: no (MI, +stress, nitrate use, current CP thought to be ischemia, ECG with pathological Qs)              History of Congestive Heart Failure: no              History of CVA: no              Preoperative Treatment with Insulin: no              Preoperative Creatinine greater than 2.0: no              Total Number of Points: 0 = 0.5% risk of major cardiac event  0 = 0.5; 1 = 1.3%; 2 = 3.6%; 3+ = 9.1%.  Patient is low risk for cardiovascular complications with proposed surgery. No further work up needed.     Type 2 diabetes mellitus without complications      Well-managed PTA with metformin and semaglutide.    - continue metformin    - hold semaglutide while hospitalized   - accuchecks and medium sliding scale insulin       Hypertension goal BP (blood pressure) < 140/90    Started on lisinopril 2014 for 's/90's given has diabetes mellitus.     BP elevated 160's due to pain and stress.    - continue lisinopril   - pain control    Hyperlipidemia LDL goal <100   - Continue simvastatin    VERONA (obstructive sleep apnea)-AHI 71      Clinically Significant Risk Factors Present on Admission         # Hyponatremia: Lowest Na = 133 mmol/L in last 2 days, will monitor as appropriate         # Drug Induced Platelet Defect: home medication list includes an antiplatelet medication   # Hypertension: Noted on problem list          # DMII: A1C = 6.6 % (Ref range: 0.0 - 5.6 %) within past 6 months    # Overweight: Estimated body mass index is 29.95 kg/m  as calculated from the following:    Height as of 7/23/25: 1.854 m (6' 1\").    Weight as of this encounter: 103 kg (227 lb).            Diet: Regular Diet Adult    DVT Prophylaxis: Low Risk/Ambulatory with no VTE prophylaxis indicated  Corley Catheter: Not present  Lines: None     Code Status:  Full         Disposition: Anticipate discharge in 2-3 day(s) once cultures known. Appropriate for inpatient admission.    Elias" MD Aria  [facility]        Primary Care Physician   Milena Kelley [phone]    History is obtained from the patient who is a good historian, wife present and offers some details, review of ED records and review of old records via the EMR.    History of Present Illness     Osmar Angel is a 55 year old male who presents with 5 days of progressive, severe left shoulder pain. Was hiking/canoeing/camping in the [facility] returning home last weekend. Monday onset of generalized joint pains with a little more pain in left shoulder. No known trauma. Seen in clinic and tickborne illness studies sent and started on doxycycline 3 days PTA. Seen in outside ED twice after that for worsening pain and then ED at [facility]. No fevers or chills. Had swelling and pain in right wrist that resolved. No h/o gout. H/o treatment for primary infection of GC 3 months ago with ceftriaxone.     In the ED, left shoulder exquisitely painful to ROM. CT showed AC joint effusion. Glenohumeral joint tapped given the exam suggesting septic shoulder but only a few drops of synovial fluid were obtained. Patient has been seen by orthopedic surgery and CRNA for brachial plexus block and shoulder now numb.     Very active with no h/o chest pain, unusual shortness of breath. No h/o heart disease. No h/o anesthesia problems.           Past Surgical History   Past Surgical History:   Procedure Laterality Date    ROTATOR CUFF REPAIR RT/LT      right arthroscopic    SURGICAL HISTORY OF -       right knee arthroscopy    SURGICAL HISTORY OF -       umbilical hernia repair    VASECTOMY      ZZC REPAIR ING HERNIA, INFANT,REDUC      bilateral        Prior to Admission Medications   Prior to Admission Medications   Prescriptions Last Dose Informant Patient Reported? Taking?   CINNAMON PO 2025 Morning Self Yes Yes   Sig: Take 2 capsules by mouth daily.   Continuous Glucose Sensor (FREESTYLE LINDA 3 PLUS SENSOR) [facility] 2025 at  6:00 AM  Self No Yes   Sig: Change every 15 days.   Omega-3 Fatty Acids (OMEGA-3 FISH OIL PO) 7/25/2025 Evening Self Yes Yes   Sig: Take 1 g by mouth every evening.   Semaglutide, 2 MG/DOSE, (OZEMPIC) 8 MG/3ML pen 7/20/2025 Evening Self No Yes   Sig: Inject 2 mg subcutaneously every 7 days.   VITAMIN D, CHOLECALCIFEROL, PO 7/25/2025 Evening Self Yes Yes   Sig: Take 10,000 Units by mouth every evening.   acetaminophen (TYLENOL) 500 MG tablet 7/25/2025 at  8:30 PM  Yes Yes   Sig: Take 1,000 mg by mouth every 4 hours.   aspirin 81 MG tablet 7/26/2025 Morning Self Yes Yes   Sig: Take 1 tablet by mouth daily.   blood glucose (ACCU-CHEK SMARTVIEW) test strip Unknown Self No Yes   Sig: USE TO TEST BLOOD SUGAR 3 TIMES DAILY   blood glucose monitoring (ACCU-CHEK FASTCLIX) lancets Unknown Self No Yes   Sig: USE AS DIRECTED 3 TIMES DAILY   doxycycline hyclate (VIBRAMYCIN) 100 MG capsule 7/26/2025 Morning Self No Yes   Sig: Take 1 capsule (100 mg) by mouth 2 times daily for 28 days.   ibuprofen (ADVIL/MOTRIN) 200 MG tablet 7/25/2025 at  8:30 PM  Yes Yes   Sig: Take 400 mg by mouth every 6 hours.   lisinopril (ZESTRIL) 10 MG tablet 7/25/2025 Evening Self No Yes   Sig: Take 1 tablet (10 mg) by mouth daily   metFORMIN (GLUCOPHAGE) 1000 MG tablet 7/26/2025 Morning Self No Yes   Sig: Take 1 tablet (1,000 mg) by mouth 2 times daily (with meals).   order for DME 7/24/2025 Bedtime Self Yes Yes   Sig: Equipment being ordered: CPAP Osmar Angel  received a [email or web address] DreamStation Auto CPAP Auto. Pressures were set at Auto 11 - 15 cm H2O.   oxyCODONE (ROXICODONE) 5 MG tablet 7/26/2025 at 12:00 AM Self No Yes   Sig: Take 1 tablet (5 mg) by mouth every 6 hours as needed for pain.   sildenafil (REVATIO) 20 MG tablet Unknown Self No Yes   Sig: TAKE 1 TO 3 TABLETS BY MOUTH 30 MINUTES TO 4 HOURS BEFORE INTERCOURSE AS NEEDED   simvastatin (ZOCOR) 20 MG tablet 7/25/2025 Bedtime Self No Yes   Sig: Take 1 tablet (20 mg) by mouth at bedtime       Facility-Administered Medications: None         Social History   I have reviewed this patient's social history and updated it with pertinent information if needed.  Social History     Tobacco Use    Smoking status: Former     Current packs/day: 0.00     Types: Cigarettes     Quit date: 2005     Years since quittin.4    Smokeless tobacco: Never   Vaping Use    Vaping status: Never Used   Substance Use Topics    Alcohol use: Yes     Alcohol/week: 0.0 standard drinks of alcohol     Comment: rarely    Drug use: No    Works heavy construction    Physical Exam   BP (!) 138/93 (BP Location: Left arm)   Pulse 106   Temp 98.1  F (36.7  C) (Oral)   Resp 22   Wt 103 kg (227 lb)   SpO2 97%   BMI 29.95 kg/m       Weight: 227 lbs 0 oz Body mass index is 29.95 kg/m .     Constitutional: Alert, oriented, cooperative, appears tired and uncomfortable, appears nontoxic  Eyes: Eyes are clear  HEENT: Oropharynx is clear and moist. No evidence of cranial trauma  Lymph/Hematologic: No epitrochlear, axillary, anterior or posterior cervical, or supraclavicular lymphadenopathy is appreciated  Cardiovascular: Regular rate and rhythm, normal S1 and S2, and no murmur noted.  Good peripheral pulses in wrists bilaterally. No lower extremity edema  Respiratory: Clear to auscultation bilaterally  GI: Soft, non-tender, normal bowel sounds  Genitourinary: Deferred  Musculoskeletal: left upper extremity anesthetized, no other joint effusions, swelling or significant pain with ROM.  Skin: Warm and dry, no rashes   Neurologic: Neck supple. Cranial nerves are grossly intact.    Data   Data reviewed today:   Recent Labs   Lab 25  0354 25  1000   WBC 11.5* 10.9   HGB 13.8 14.9   MCV 88 89    213   * 137   POTASSIUM 4.4 5.0   CHLORIDE 98 101   CO2 21* 26   BUN 18.2 17.7   CR 0.76 0.89   ANIONGAP 14 10   ROOPA 9.5 9.8   * 147*   ALBUMIN  --  4.2   PROTTOTAL  --  7.2   BILITOTAL  --  0.5   ALKPHOS  --  46   ALT   --  20   AST  --  25       Recent Results (from the past 24 hours)   XR Shoulder Left 2 Views    Narrative    EXAM: XR SHOULDER LEFT 2 VIEWS  LOCATION: [facility]  DATE: 7/26/2025    INDICATION: severe pain in shoulder with localized swelling  COMPARISON: None.      Impression    IMPRESSION: Normal joint spaces and alignment. No fracture.   CT Shoulder Left w Contrast    Narrative    EXAM: CT SHOULDER LEFT W CONTRAST  LOCATION: [facility]  DATE: 7/26/2025    INDICATION: Pain out of proportion around the left shoulder. Shoulder pain for about 5 days without any known antecedent trauma. Abnormally elevated, rising CRP level.  COMPARISON: None.  TECHNIQUE: IV contrast. Axial, sagittal and coronal thin-section reconstruction. Dose reduction techniques were used.   CONTRAST: 90mL Isovue 370    FINDINGS:  There is distention and synovial enhancement at acromioclavicular joint with subcutaneous edema over the cephalad aspect of the shoulder centered around the acromioclavicular joint and superficial to the normal-appearing distal trapezius and proximal   deltoid muscles. There is minimal acromioclavicular joint degenerative change but no demineralization or erosion. No soft tissue gas or focal fluid collection. No glenohumeral or subacromial bursal effusion.    The rotator cuff and other shoulder musculature appears normal. The scapula, humerus, and other visualized bones of the upper left thoracic cage appear normal.    No lymphadenopathy or soft tissue gas. The visualized left lung is clear. Moderately severe left main coronary artery calcification.      Impression    IMPRESSION:  1.  Acromioclavicular joint effusion with synovial enhancement and overlying soft tissue edema. This may represent septic arthritis or inflammatory arthritis. There is no evidence of osteomyelitis or abscess.  2.  Coronary artery atheromatous calcification.       MR Shoulder Left w/o & w  Contrast    Narrative    EXAM: MR SHOULDER LEFT W/O and W CONTRAST  LOCATION: [facility]  DATE: 7/26/2025    INDICATION: Acute monoarthritis.  COMPARISON: None.  TECHNIQUE: Routine. Additional postgadolinium T1 sequences were obtained.  IV CONTRAST: 10 mL Gadavist    FINDINGS:    ROTATOR CUFF:  -Supraspinatus: Full-thickness tearing within the supraspinatus extends into the infraspinatus junction. The area of full-thickness tearing measures on the order of 2.1 cm with 1.7 cm of retraction. No significant atrophy.  -Infraspinatus: As stated, the area of full-thickness tearing extends to the anterior margin of the infraspinatus. There is tendinopathy within the remainder of the tendon attachment. No retraction or atrophy.  -Subscapularis: Mild to moderate severity tendinopathy without tearing. No retraction or atrophy.  -Teres minor: No tendon tear, tendinopathy or fatty atrophy.    CORACOACROMIAL ARCH:  -Morphology: Type II acromion. No subacromial spur. Subacromial and subcoracoid space are normal.   -Bursa: Trace fluid in the subacromial-subdeltoid bursa. The coracoacromial and coracoclavicular ligaments are negative.    ACROMIOCLAVICULAR JOINT:   -Minimal diastasis of the AC joint interval. Mild hypertrophic change. Mild reactive edema distal clavicle.     LONG HEAD OF BICEPS TENDON:   -No tendinopathy or tear. No tenosynovitis or subluxation.    GLENOHUMERAL JOINT:   -Labrum: No labral tear. No paralabral cyst.   -Cartilage: Mild grade II cartilage thinning both sides of the joint.   -Joint space: No effusion or synovitis.  -Glenohumeral ligaments and capsule: No pericapsular inflammation.    BONES:   -No fracture or concerning marrow replacing lesion.    SOFT TISSUES:   -There is considerable T2 signal abnormality within the subcutaneous soft tissues superior to the distal clavicle and AC joint as well as posterior to the acromion. This could be due to direct impact injury/contusion  or reactive. There is also signal   abnormality within the anterior deltoid which could be due to muscle strain.      Impression    IMPRESSION:  1.  Full-thickness tear of the central to posterior supraspinatus extending into the infraspinatus junction. The full-thickness tear measures approximately 2.1 cm in AP dimension with 1.7 cm of retraction but no significant atrophy.  2.  Mild to moderate severity subscapularis tendinopathy without tearing.  3.  Tendinopathy within the remainder of the infraspinatus tendon.  4.  Minimal diastasis of the AC joint interval with mild hypertrophic change and reactive edema distal clavicle. This could be secondary to a more acute AC separation-type injury. The coracoacromial and coracoclavicular ligaments remain intact, but there   is considerable surrounding soft tissue edema and/or inflammation within the distal trapezius and subcutaneous soft tissues superior to the distal clavicle and AC joint.  5.  Separate area of signal abnormality within the anterior deltoid could be due to direct impact injury or muscle strain.  6.  No evidence for displaced fracture.  7.  No evidence for labral tear.  8.  Mild grade II cartilage loss both sides of the glenohumeral joint.   POC US GUIDANCE NEEDLE PLACEMENT    Impression    normal       I personally reviewed no images or EKG's today.    Medical Decision Making       65 MINUTES SPENT BY ME on the date of service doing chart review, history, exam, documentation & further activities per the note.        Elias Knapp MD  [facility] Medicine

## 2025-07-26 NOTE — ED PROVIDER NOTES
History     Chief Complaint   Patient presents with    Shoulder Pain     HPI  Osmar Angel is a 55 year old male who presents for severe left shoulder pain.  The patient says that this has been ongoing over the past 5 days.  No known injury.  He took a nap and then woke up and he felt achy all over with multiple diffuse joint pain but pain was mostly within the left shoulder.  He since then he has not had any fevers but he is felt weak and rundown and had continuous severe left shoulder pain.  He has been seen in multiple different clinic settings and emergency departments and says that he has been told he probably has tickborne disease and has been started on doxycycline and has been given short courses of oxycodone.  The patient is concerned there is something else going on.  No chest pain or difficulty breathing or cough.  No rash.    Allergies:  Allergies   Allergen Reactions    Wellbutrin [Bupropion Hcl] Swelling       Problem List:    Patient Active Problem List    Diagnosis Date Noted    VERONA (obstructive sleep apnea)-AHI 71 08/26/2015     Priority: Medium     Severe VERONA: 8/20/2015 (AHI 71, RDI 71, rebel 72%)      Obesity, Class II, BMI 35-39.9, with comorbidity 08/12/2015     Priority: Medium    Type 2 diabetes mellitus without complications (H) 06/04/2012     Priority: Medium     Diagnosed 2011      Hyperlipidemia LDL goal <100 06/04/2012     Priority: Medium    Hypertension goal BP (blood pressure) < 140/90 01/15/2010     Priority: Medium    External hemorrhoids 10/27/2008     Priority: Medium    Pain in joint, upper arm 06/04/2007     Priority: Medium    Profound impairment, one eye, impairment level not further specified 06/07/2005     Priority: Medium     left eye loss of direct vision          Past Medical History:    Past Medical History:   Diagnosis Date    Amblyopia, unspecified     Myopia     Profound impairment, one eye, impairment level not further specified     Sprain of ankle, unspecified  site        Past Surgical History:    Past Surgical History:   Procedure Laterality Date    ROTATOR CUFF REPAIR RT/LT      right arthroscopic    SURGICAL HISTORY OF -       right knee arthroscopy    SURGICAL HISTORY OF -       umbilical hernia repair    VASECTOMY      ZZC REPAIR ING HERNIA, INFANT,REDUC      bilateral       Family History:    Family History   Problem Relation Age of Onset    Diabetes Maternal Grandfather     JULIANNA. Paternal Grandmother     MEENA Paternal Grandfather     Muscular Dystrophy Paternal Grandfather     Cardiovascular Father         a. fib    Cerebrovascular Disease Father     Heart Disease Father         pacemaker    Asthma No family hx of     Hypertension No family hx of     Breast Cancer No family hx of     Cancer - colorectal No family hx of     Prostate Cancer No family hx of        Social History:  Marital Status:   [2]  Social History     Tobacco Use    Smoking status: Former     Current packs/day: 0.00     Types: Cigarettes     Quit date: 2005     Years since quittin.4    Smokeless tobacco: Never   Vaping Use    Vaping status: Never Used   Substance Use Topics    Alcohol use: Yes     Alcohol/week: 0.0 standard drinks of alcohol     Comment: rarely    Drug use: No        Medications:    aspirin 81 MG tablet  blood glucose (ACCU-CHEK SMARTVIEW) test strip  blood glucose monitoring (ACCU-CHEK FASTCLIX) lancets  cephALEXin (KEFLEX) 500 MG capsule  CINNAMON PO  Continuous Glucose Sensor (FREESTYLE LINDA 3 PLUS SENSOR) MISC  doxycycline hyclate (VIBRA-TABS) 100 MG tablet  doxycycline hyclate (VIBRAMYCIN) 100 MG capsule  guaiFENesin-codeine (ROBITUSSIN AC) 100-10 MG/5ML solution  lisinopril (ZESTRIL) 10 MG tablet  metFORMIN (GLUCOPHAGE) 1000 MG tablet  Omega-3 Fatty Acids (OMEGA-3 FISH OIL PO)  order for DME  oxyCODONE (ROXICODONE) 5 MG tablet  Semaglutide, 2 MG/DOSE, (OZEMPIC) 8 MG/3ML pen  sildenafil (REVATIO) 20 MG tablet  simvastatin (ZOCOR) 20 MG  tablet  VITAMIN D, CHOLECALCIFEROL, PO          Review of Systems    Physical Exam   BP: (!) 163/110  Pulse: 103  Temp: 98.1  F (36.7  C)  Resp: 18  Weight: 103 kg (227 lb)  SpO2: 97 %      Physical Exam  Constitutional:       General: He is in acute distress.      Appearance: He is well-developed. He is diaphoretic.   HENT:      Head: Normocephalic and atraumatic.   Cardiovascular:      Rate and Rhythm: Normal rate.   Pulmonary:      Effort: No respiratory distress.      Breath sounds: No stridor.   Musculoskeletal:      Comments: Tenderness over the left shoulder with muscle spasms present, tenderness is maximal over the AC joint.  I am able to passively range the joint slightly with internal and external rotation at the shoulder and some extension outward however quick movements cause severe pain.  No erythema or excess warmth.   Skin:     General: Skin is warm.   Neurological:      Mental Status: He is alert.         ED Course        Procedures              Critical Care time:  none     None         Recent Results (from the past 24 hours)   Basic Metabolic Panel (Limited Occurrences)   Result Value Ref Range    Sodium 133 (L) 135 - 145 mmol/L    Potassium 4.4 3.4 - 5.3 mmol/L    Chloride 98 98 - 107 mmol/L    Carbon Dioxide (CO2) 21 (L) 22 - 29 mmol/L    Anion Gap 14 7 - 15 mmol/L    Urea Nitrogen 18.2 6.0 - 20.0 mg/dL    Creatinine 0.76 0.67 - 1.17 mg/dL    GFR Estimate >90 >60 mL/min/1.73m2    Calcium 9.5 8.8 - 10.4 mg/dL    Glucose 143 (H) 70 - 99 mg/dL   CBC with Platelets and Differential (Limited Occurrences)    Narrative    The following orders were created for panel order CBC with Platelets and Differential (Limited Occurrences).  Procedure                               Abnormality         Status                     ---------                               -----------         ------                     CBC with platelets and ...[[id number]]  Abnormal            Final result                 Please view  results for these tests on the individual orders.   CK total   Result Value Ref Range    CK 79 39 - 308 U/L   CRP Inflammation   Result Value Ref Range    CRP Inflammation 167.27 (H) <5.00 mg/L   CBC with platelets and differential   Result Value Ref Range    WBC Count 11.5 (H) 4.0 - 11.0 10e3/uL    RBC Count 4.66 4.40 - 5.90 10e6/uL    Hemoglobin 13.8 13.3 - 17.7 g/dL    Hematocrit 41.1 40.0 - 53.0 %    MCV 88 78 - 100 fL    MCH 29.6 26.5 - 33.0 pg    MCHC 33.6 31.5 - 36.5 g/dL    RDW 13.2 10.0 - 15.0 %    Platelet Count 240 150 - 450 10e3/uL    % Neutrophils 67 %    % Lymphocytes 17 %    % Monocytes 13 %    % Eosinophils 3 %    % Basophils 0 %    % Immature Granulocytes 0 %    NRBCs per 100 WBC 0 <1 /100    Absolute Neutrophils 7.7 1.6 - 8.3 10e3/uL    Absolute Lymphocytes 2.0 0.8 - 5.3 10e3/uL    Absolute Monocytes 1.5 (H) 0.0 - 1.3 10e3/uL    Absolute Eosinophils 0.3 0.0 - 0.7 10e3/uL    Absolute Basophils 0.0 0.0 - 0.2 10e3/uL    Absolute Immature Granulocytes 0.0 <=0.4 10e3/uL    Absolute NRBCs 0.0 10e3/uL   XR Shoulder Left 2 Views    Narrative    EXAM: XR SHOULDER LEFT 2 VIEWS  LOCATION: [facility]  DATE: 7/26/2025    INDICATION: severe pain in shoulder with localized swelling  COMPARISON: None.      Impression    IMPRESSION: Normal joint spaces and alignment. No fracture.   CT Shoulder Left w Contrast    Narrative    EXAM: CT SHOULDER LEFT W CONTRAST  LOCATION: [facility]  DATE: 7/26/2025    INDICATION: Pain out of proportion around the left shoulder. Shoulder pain for about 5 days without any known antecedent trauma. Abnormally elevated, rising CRP level.  COMPARISON: None.  TECHNIQUE: IV contrast. Axial, sagittal and coronal thin-section reconstruction. Dose reduction techniques were used.   CONTRAST: 90mL Isovue 370    FINDINGS:  There is distention and synovial enhancement at acromioclavicular joint with subcutaneous edema over the cephalad aspect  of the shoulder centered around the acromioclavicular joint and superficial to the normal-appearing distal trapezius and proximal   deltoid muscles. There is minimal acromioclavicular joint degenerative change but no demineralization or erosion. No soft tissue gas or focal fluid collection. No glenohumeral or subacromial bursal effusion.    The rotator cuff and other shoulder musculature appears normal. The scapula, humerus, and other visualized bones of the upper left thoracic cage appear normal.    No lymphadenopathy or soft tissue gas. The visualized left lung is clear. Moderately severe left main coronary artery calcification.      Impression    IMPRESSION:  1.  Acromioclavicular joint effusion with synovial enhancement and overlying soft tissue edema. This may represent septic arthritis or inflammatory arthritis. There is no evidence of osteomyelitis or abscess.  2.  Coronary artery atheromatous calcification.           Medications   HYDROmorphone (PF) (DILAUDID) injection 0.5 mg (0.5 mg Intravenous $Given 7/26/25 0622)   oxyCODONE (ROXICODONE) tablet 10 mg (10 mg Oral $Given 7/26/25 0311)   ibuprofen (ADVIL/MOTRIN) tablet 600 mg (600 mg Oral $Given 7/26/25 0331)   acetaminophen (TYLENOL) tablet 1,000 mg (1,000 mg Oral $Given 7/26/25 0331)   iopamidol (ISOVUE-370) solution 90 mL (90 mLs Intravenous $Given 7/26/25 0501)   sodium chloride 0.9 % bag for CT scan flush (70 mLs Intravenous $Given 7/26/25 0501)       Assessments & Plan (with Medical Decision Making)   55-year-old male presents for severe left shoulder pain over the last 5 days.  Currently on antibiotics for possible Lyme disease.  Is given a dose of oxycodone, ibuprofen, and acetaminophen for symptoms.  He has a mild leukocytosis of 11.5.  CK is normal, no signs of muscle disease.  CRP is quite elevated 167.  Electrolytes are within normal limits.  X-ray and CT of the shoulder obtained, images interpreted independently as well as radiology reads  reviewed, positive for significant swelling and joint effusion of the AC joint.  Concerning for septic arthritis or inflammatory arthritis at this time.  Given the patient's symptoms and getting worse over 5 days and the severe pain he is given IV hydromorphone for the pain.  I have signed the patient out to Dr. Lynch at change of shift with the plan of possible arthrocentesis and discussed the case with orthopedic surgery.    I have reviewed the nursing notes.    I have reviewed the findings, diagnosis, plan and need for follow up with the patient.         New Prescriptions    No medications on file       Final diagnoses:   Effusion of acromioclavicular joint, left   Acute pain of left shoulder       7/26/2025   [facility] EMERGENCY DEPT       Missael Rose MD  07/26/25 1009

## 2025-07-26 NOTE — ANESTHESIA PROCEDURE NOTES
Brachial plexus (with a distal superficial cervical plexus nerve block) Procedure Note    Pre-Procedure   Staff -        CRNA: Henok Drummond APRN CRNA       Performed By: CRNA       Location: floor       Procedure Start/Stop Times: 7/26/2025 1:24 PM and 7/26/2025 1:31 PM       Pre-Anesthestic Checklist: patient identified, IV checked, site marked, risks and benefits discussed, informed consent, monitors and equipment checked, pre-op evaluation, at physician/surgeon's request and post-op pain management  Timeout:       Correct Patient: Yes        Correct Procedure: Yes        Correct Site: Yes        Correct Position: Yes        Correct Laterality: Yes        Site Marked: Yes  Procedure Documentation  Procedure: Brachial plexus (with a distal superficial cervical plexus nerve block)         Laterality: left       Patient Position: sitting       Patient Prep/Sterile Barriers: sterile gloves, mask, patient draped       Skin prep: Chloraprep (interscalene approach).       Needle Type: insulated       Needle Gauge: 22.        Needle Length (millimeters): 50        Ultrasound guided       1. Ultrasound was used to identify targeted nerve, plexus, vascular marker, or fascial plane and place a needle adjacent to it in real-time.       2. Ultrasound was used to visualize the spread of anesthetic in close proximity to the above referenced structure.       3. A permanent image is entered into the patient's record.       4. The visualized anatomic structures appeared normal.       5. There were no apparent abnormal pathologic findings.    Assessment/Narrative         The placement was negative for: blood aspirated, painful injection and site bleeding       Paresthesias: No and Resolved.       Bolus given via needle..        Secured via.        Insertion/Infusion Method: Single Shot       Complications: none    Medication(s) Administered   Bupivacaine 0.5% PF (Infiltration) - Infiltration   10 mL - 7/26/2025 1:26:00  "PM  Bupivacaine liposome (Exparel) 1.3% LA inj susp (Infiltration) - Infiltration   10 mL - 7/26/2025 1:28:00 PM  Medication Administration Time: 7/26/2025 1:24 PM      FOR [facility] ONLY:   Pain Team Contact information: please page the Pain Team Via [employer]. Search \"Pain\". During daytime hours, please page the attending first. At night please page the resident first.      "

## 2025-07-26 NOTE — ED TRIAGE NOTES
Pt presents with concerns of left shoulder pain that has been ongoing for last 3-4 days. Per pt he was diagnosed with lymes 3-4 days ago and was started on doxy. Pt states pain has been in same place since he was first seen in [facility]. Per pt he was seen in ER twice this week for uncontrolled pain. Pt last took one oxycodone at 0000, and last took tylenol and ibuprofen at 2000 last evening. Pt states muscle spasms are most painful at this moment.     Triage Assessment (Adult)       Row Name 07/26/25 0231          Triage Assessment    Airway WDL WDL        Respiratory WDL    Respiratory WDL WDL        Skin Circulation/Temperature WDL    Skin Circulation/Temperature WDL WDL        Cardiac WDL    Cardiac WDL WDL        Peripheral/Neurovascular WDL    Peripheral Neurovascular WDL WDL        Cognitive/Neuro/Behavioral WDL    Cognitive/Neuro/Behavioral WDL WDL

## 2025-07-26 NOTE — MEDICATION SCRIBE - ADMISSION MEDICATION HISTORY
Medication Scribe Admission Medication History    Admission medication history is complete. The information provided in this note is only as accurate as the sources available at the time of the update.    Information Source(s): Patient and CareEverywhere/SureScripts via in-person    Pertinent Information: PTA med list reviewed with patient in room and finished at desk.  He has all of his medicines with him today except for Semaglutide which is being brought in today.  He took his own home meds in ER this morning.  I did not ask about Sildenafil as he had a guest with him.  His doctor wrote him a script for Hydrocodone before the Oxycodone.  Patient states that that was an error and he never took any of the Hydrocodone.    Changes made to PTA medication list:  Added:   Acetaminophen 500 mg  Ibuprofen 200 mg    Deleted:   Cephalexin 500 mg  Doxycycline 100 mg(old script)  Robitussin AC    Changed:   Added sig for Cinnamon and Fish Oil.    Allergies reviewed with patient and updates made in EHR: yes, no change.    Medication History Completed By: Ana Anguiano 7/26/2025 10:55 AM    PTA Med List   Medication Sig Last Dose/Taking    aspirin 81 MG tablet Take 1 tablet by mouth daily. 7/26/2025 Morning    blood glucose (ACCU-CHEK SMARTVIEW) test strip USE TO TEST BLOOD SUGAR 3 TIMES DAILY Unknown    blood glucose monitoring (ACCU-CHEK FASTCLIX) lancets USE AS DIRECTED 3 TIMES DAILY Unknown    CINNAMON PO Take 2 capsules by mouth daily. 7/26/2025 Morning    Continuous Glucose Sensor (FREESTYLE LINDA 3 PLUS SENSOR) MISC Change every 15 days. 7/26/2025 at  6:00 AM    doxycycline hyclate (VIBRAMYCIN) 100 MG capsule Take 1 capsule (100 mg) by mouth 2 times daily for 28 days. 7/26/2025 Morning    lisinopril (ZESTRIL) 10 MG tablet Take 1 tablet (10 mg) by mouth daily 7/25/2025 Evening    metFORMIN (GLUCOPHAGE) 1000 MG tablet Take 1 tablet (1,000 mg) by mouth 2 times daily (with meals). 7/26/2025 Morning    Omega-3 Fatty Acids  (OMEGA-3 FISH OIL PO) Take 1 g by mouth every evening. 7/25/2025 Evening    order for DME Equipment being ordered: CPAP Osmar Angel  received a [employer] DreamStation Auto CPAP Auto. Pressures were set at Auto 11 - 15 cm H2O. 7/24/2025 Bedtime    oxyCODONE (ROXICODONE) 5 MG tablet Take 1 tablet (5 mg) by mouth every 6 hours as needed for pain. 7/26/2025 at 12:00 AM    Semaglutide, 2 MG/DOSE, (OZEMPIC) 8 MG/3ML pen Inject 2 mg subcutaneously every 7 days. 7/20/2025 Evening    sildenafil (REVATIO) 20 MG tablet TAKE 1 TO 3 TABLETS BY MOUTH 30 MINUTES TO 4 HOURS BEFORE INTERCOURSE AS NEEDED Unknown    simvastatin (ZOCOR) 20 MG tablet Take 1 tablet (20 mg) by mouth at bedtime 7/25/2025 Bedtime    VITAMIN D, CHOLECALCIFEROL, PO Take 10,000 Units by mouth every evening. 7/25/2025 Evening

## 2025-07-27 ENCOUNTER — ANESTHESIA (OUTPATIENT)
Dept: SURGERY | Facility: CLINIC | Age: 55
End: 2025-07-27
Payer: COMMERCIAL

## 2025-07-27 ENCOUNTER — ANCILLARY PROCEDURE (OUTPATIENT)
Dept: ULTRASOUND IMAGING | Facility: CLINIC | Age: 55
End: 2025-07-27
Payer: COMMERCIAL

## 2025-07-27 LAB
ANION GAP SERPL CALCULATED.3IONS-SCNC: 15 MMOL/L (ref 7–15)
BUN SERPL-MCNC: 14.5 MG/DL (ref 6–20)
C TRACH DNA SPEC QL NAA+PROBE: NEGATIVE
CALCIUM SERPL-MCNC: 9.6 MG/DL (ref 8.8–10.4)
CHLORIDE SERPL-SCNC: 98 MMOL/L (ref 98–107)
CREAT SERPL-MCNC: 0.74 MG/DL (ref 0.67–1.17)
EGFRCR SERPLBLD CKD-EPI 2021: >90 ML/MIN/1.73M2
ERYTHROCYTE [DISTWIDTH] IN BLOOD BY AUTOMATED COUNT: 13.1 % (ref 10–15)
GLUCOSE BLDC GLUCOMTR-MCNC: 122 MG/DL (ref 70–99)
GLUCOSE BLDC GLUCOMTR-MCNC: 131 MG/DL (ref 70–99)
GLUCOSE BLDC GLUCOMTR-MCNC: 141 MG/DL (ref 70–99)
GLUCOSE BLDC GLUCOMTR-MCNC: 148 MG/DL (ref 70–99)
GLUCOSE BLDC GLUCOMTR-MCNC: 171 MG/DL (ref 70–99)
GLUCOSE SERPL-MCNC: 132 MG/DL (ref 70–99)
HCO3 SERPL-SCNC: 23 MMOL/L (ref 22–29)
HCT VFR BLD AUTO: 41.1 % (ref 40–53)
HGB BLD-MCNC: 13.5 G/DL (ref 13.3–17.7)
HIV 1+2 AB+HIV1 P24 AG SERPL QL IA: NONREACTIVE
MCH RBC QN AUTO: 29.4 PG (ref 26.5–33)
MCHC RBC AUTO-ENTMCNC: 32.8 G/DL (ref 31.5–36.5)
MCV RBC AUTO: 90 FL (ref 78–100)
N GONORRHOEA DNA SPEC QL NAA+PROBE: NEGATIVE
PLATELET # BLD AUTO: 282 10E3/UL (ref 150–450)
POTASSIUM SERPL-SCNC: 4.5 MMOL/L (ref 3.4–5.3)
RBC # BLD AUTO: 4.59 10E6/UL (ref 4.4–5.9)
SODIUM SERPL-SCNC: 136 MMOL/L (ref 135–145)
SPECIMEN TYPE: NORMAL
SPECIMEN TYPE: NORMAL
T PALLIDUM AB SER QL: NONREACTIVE
WBC # BLD AUTO: 10.2 10E3/UL (ref 4–11)

## 2025-07-27 PROCEDURE — 36415 COLL VENOUS BLD VENIPUNCTURE: CPT | Performed by: INTERNAL MEDICINE

## 2025-07-27 PROCEDURE — 250N000025 HC SEVOFLURANE, PER MIN: Performed by: ORTHOPAEDIC SURGERY

## 2025-07-27 PROCEDURE — 250N000013 HC RX MED GY IP 250 OP 250 PS 637: Performed by: ORTHOPAEDIC SURGERY

## 2025-07-27 PROCEDURE — 999N000157 HC STATISTIC RCP TIME EA 10 MIN

## 2025-07-27 PROCEDURE — 271N000001 HC OR GENERAL SUPPLY NON-STERILE: Performed by: ORTHOPAEDIC SURGERY

## 2025-07-27 PROCEDURE — 250N000011 HC RX IP 250 OP 636: Performed by: INTERNAL MEDICINE

## 2025-07-27 PROCEDURE — 99232 SBSQ HOSP IP/OBS MODERATE 35: CPT | Performed by: INTERNAL MEDICINE

## 2025-07-27 PROCEDURE — 250N000011 HC RX IP 250 OP 636: Performed by: NURSE ANESTHETIST, CERTIFIED REGISTERED

## 2025-07-27 PROCEDURE — 87075 CULTR BACTERIA EXCEPT BLOOD: CPT | Performed by: ORTHOPAEDIC SURGERY

## 2025-07-27 PROCEDURE — 258N000003 HC RX IP 258 OP 636: Performed by: ORTHOPAEDIC SURGERY

## 2025-07-27 PROCEDURE — 250N000013 HC RX MED GY IP 250 OP 250 PS 637

## 2025-07-27 PROCEDURE — 258N000003 HC RX IP 258 OP 636: Performed by: NURSE ANESTHETIST, CERTIFIED REGISTERED

## 2025-07-27 PROCEDURE — 80051 ELECTROLYTE PANEL: CPT | Performed by: INTERNAL MEDICINE

## 2025-07-27 PROCEDURE — 360N000077 HC SURGERY LEVEL 4, PER MIN: Performed by: ORTHOPAEDIC SURGERY

## 2025-07-27 PROCEDURE — 370N000017 HC ANESTHESIA TECHNICAL FEE, PER MIN: Performed by: ORTHOPAEDIC SURGERY

## 2025-07-27 PROCEDURE — 250N000011 HC RX IP 250 OP 636: Performed by: ORTHOPAEDIC SURGERY

## 2025-07-27 PROCEDURE — 710N000009 HC RECOVERY PHASE 1, LEVEL 1, PER MIN: Performed by: ORTHOPAEDIC SURGERY

## 2025-07-27 PROCEDURE — 87070 CULTURE OTHR SPECIMN AEROBIC: CPT | Performed by: ORTHOPAEDIC SURGERY

## 2025-07-27 PROCEDURE — 250N000013 HC RX MED GY IP 250 OP 250 PS 637: Performed by: INTERNAL MEDICINE

## 2025-07-27 PROCEDURE — 272N000001 HC OR GENERAL SUPPLY STERILE: Performed by: ORTHOPAEDIC SURGERY

## 2025-07-27 PROCEDURE — 120N000001 HC R&B MED SURG/OB

## 2025-07-27 PROCEDURE — 85027 COMPLETE CBC AUTOMATED: CPT | Performed by: INTERNAL MEDICINE

## 2025-07-27 RX ORDER — IBUPROFEN 600 MG/1
600 TABLET, FILM COATED ORAL EVERY 6 HOURS PRN
Status: DISCONTINUED | OUTPATIENT
Start: 2025-07-27 | End: 2025-07-29 | Stop reason: HOSPADM

## 2025-07-27 RX ORDER — NALOXONE HYDROCHLORIDE 0.4 MG/ML
0.2 INJECTION, SOLUTION INTRAMUSCULAR; INTRAVENOUS; SUBCUTANEOUS
Status: DISCONTINUED | OUTPATIENT
Start: 2025-07-27 | End: 2025-07-29 | Stop reason: HOSPADM

## 2025-07-27 RX ORDER — LIDOCAINE 40 MG/G
CREAM TOPICAL
Status: DISCONTINUED | OUTPATIENT
Start: 2025-07-27 | End: 2025-07-29 | Stop reason: HOSPADM

## 2025-07-27 RX ORDER — FENTANYL CITRATE 50 UG/ML
50 INJECTION, SOLUTION INTRAMUSCULAR; INTRAVENOUS EVERY 5 MIN PRN
Status: DISCONTINUED | OUTPATIENT
Start: 2025-07-27 | End: 2025-07-27 | Stop reason: HOSPADM

## 2025-07-27 RX ORDER — KETOROLAC TROMETHAMINE 30 MG/ML
INJECTION, SOLUTION INTRAMUSCULAR; INTRAVENOUS PRN
Status: DISCONTINUED | OUTPATIENT
Start: 2025-07-27 | End: 2025-07-27

## 2025-07-27 RX ORDER — CEFAZOLIN SODIUM/WATER 2 G/20 ML
SYRINGE (ML) INTRAVENOUS PRN
Status: DISCONTINUED | OUTPATIENT
Start: 2025-07-27 | End: 2025-07-27

## 2025-07-27 RX ORDER — BISACODYL 10 MG
10 SUPPOSITORY, RECTAL RECTAL DAILY PRN
Status: DISCONTINUED | OUTPATIENT
Start: 2025-07-30 | End: 2025-07-29 | Stop reason: HOSPADM

## 2025-07-27 RX ORDER — OXYCODONE HYDROCHLORIDE 5 MG/1
5 TABLET ORAL EVERY 4 HOURS PRN
Status: DISCONTINUED | OUTPATIENT
Start: 2025-07-27 | End: 2025-07-29 | Stop reason: HOSPADM

## 2025-07-27 RX ORDER — FENTANYL CITRATE 50 UG/ML
25 INJECTION, SOLUTION INTRAMUSCULAR; INTRAVENOUS EVERY 5 MIN PRN
Status: DISCONTINUED | OUTPATIENT
Start: 2025-07-27 | End: 2025-07-27 | Stop reason: HOSPADM

## 2025-07-27 RX ORDER — POLYETHYLENE GLYCOL 3350 17 G/17G
17 POWDER, FOR SOLUTION ORAL DAILY
Status: DISCONTINUED | OUTPATIENT
Start: 2025-07-28 | End: 2025-07-29 | Stop reason: HOSPADM

## 2025-07-27 RX ORDER — ACETAMINOPHEN 325 MG/1
975 TABLET ORAL EVERY 8 HOURS
Status: DISCONTINUED | OUTPATIENT
Start: 2025-07-27 | End: 2025-07-29 | Stop reason: HOSPADM

## 2025-07-27 RX ORDER — SODIUM CHLORIDE, SODIUM LACTATE, POTASSIUM CHLORIDE, CALCIUM CHLORIDE 600; 310; 30; 20 MG/100ML; MG/100ML; MG/100ML; MG/100ML
INJECTION, SOLUTION INTRAVENOUS CONTINUOUS
Status: DISCONTINUED | OUTPATIENT
Start: 2025-07-27 | End: 2025-07-29

## 2025-07-27 RX ORDER — CEFAZOLIN SODIUM 2 G/50ML
2 SOLUTION INTRAVENOUS EVERY 8 HOURS
Status: COMPLETED | OUTPATIENT
Start: 2025-07-27 | End: 2025-07-27

## 2025-07-27 RX ORDER — SODIUM CHLORIDE, SODIUM LACTATE, POTASSIUM CHLORIDE, CALCIUM CHLORIDE 600; 310; 30; 20 MG/100ML; MG/100ML; MG/100ML; MG/100ML
INJECTION, SOLUTION INTRAVENOUS CONTINUOUS
Status: DISCONTINUED | OUTPATIENT
Start: 2025-07-27 | End: 2025-07-27 | Stop reason: HOSPADM

## 2025-07-27 RX ORDER — HYDROMORPHONE HCL IN WATER/PF 6 MG/30 ML
0.4 PATIENT CONTROLLED ANALGESIA SYRINGE INTRAVENOUS
Status: DISCONTINUED | OUTPATIENT
Start: 2025-07-27 | End: 2025-07-29

## 2025-07-27 RX ORDER — HYDROMORPHONE HYDROCHLORIDE 1 MG/ML
0.5 INJECTION, SOLUTION INTRAMUSCULAR; INTRAVENOUS; SUBCUTANEOUS
Status: DISCONTINUED | OUTPATIENT
Start: 2025-07-27 | End: 2025-07-29

## 2025-07-27 RX ORDER — BUPIVACAINE HYDROCHLORIDE 5 MG/ML
INJECTION, SOLUTION EPIDURAL; INTRACAUDAL; PERINEURAL PRN
Status: DISCONTINUED | OUTPATIENT
Start: 2025-07-27 | End: 2025-07-27

## 2025-07-27 RX ORDER — OXYCODONE HYDROCHLORIDE 5 MG/1
10 TABLET ORAL EVERY 4 HOURS PRN
Status: DISCONTINUED | OUTPATIENT
Start: 2025-07-27 | End: 2025-07-29 | Stop reason: HOSPADM

## 2025-07-27 RX ORDER — ONDANSETRON 4 MG/1
4 TABLET, ORALLY DISINTEGRATING ORAL EVERY 6 HOURS PRN
Status: DISCONTINUED | OUTPATIENT
Start: 2025-07-27 | End: 2025-07-29 | Stop reason: HOSPADM

## 2025-07-27 RX ORDER — NALOXONE HYDROCHLORIDE 0.4 MG/ML
0.4 INJECTION, SOLUTION INTRAMUSCULAR; INTRAVENOUS; SUBCUTANEOUS
Status: DISCONTINUED | OUTPATIENT
Start: 2025-07-27 | End: 2025-07-29 | Stop reason: HOSPADM

## 2025-07-27 RX ORDER — HYDROMORPHONE HCL IN WATER/PF 6 MG/30 ML
0.2 PATIENT CONTROLLED ANALGESIA SYRINGE INTRAVENOUS
Status: DISCONTINUED | OUTPATIENT
Start: 2025-07-27 | End: 2025-07-29

## 2025-07-27 RX ORDER — NALOXONE HYDROCHLORIDE 0.4 MG/ML
0.1 INJECTION, SOLUTION INTRAMUSCULAR; INTRAVENOUS; SUBCUTANEOUS
Status: DISCONTINUED | OUTPATIENT
Start: 2025-07-27 | End: 2025-07-27 | Stop reason: HOSPADM

## 2025-07-27 RX ORDER — PROPOFOL 10 MG/ML
INJECTION, EMULSION INTRAVENOUS PRN
Status: DISCONTINUED | OUTPATIENT
Start: 2025-07-27 | End: 2025-07-27

## 2025-07-27 RX ORDER — ONDANSETRON 2 MG/ML
INJECTION INTRAMUSCULAR; INTRAVENOUS PRN
Status: DISCONTINUED | OUTPATIENT
Start: 2025-07-27 | End: 2025-07-27

## 2025-07-27 RX ORDER — ASPIRIN 81 MG/1
81 TABLET ORAL DAILY
Status: DISCONTINUED | OUTPATIENT
Start: 2025-07-27 | End: 2025-07-29 | Stop reason: HOSPADM

## 2025-07-27 RX ORDER — ONDANSETRON 2 MG/ML
4 INJECTION INTRAMUSCULAR; INTRAVENOUS EVERY 6 HOURS PRN
Status: DISCONTINUED | OUTPATIENT
Start: 2025-07-27 | End: 2025-07-29 | Stop reason: HOSPADM

## 2025-07-27 RX ORDER — PROCHLORPERAZINE MALEATE 5 MG/1
10 TABLET ORAL EVERY 6 HOURS PRN
Status: DISCONTINUED | OUTPATIENT
Start: 2025-07-27 | End: 2025-07-29 | Stop reason: HOSPADM

## 2025-07-27 RX ORDER — SODIUM CHLORIDE, SODIUM LACTATE, POTASSIUM CHLORIDE, CALCIUM CHLORIDE 600; 310; 30; 20 MG/100ML; MG/100ML; MG/100ML; MG/100ML
INJECTION, SOLUTION INTRAVENOUS CONTINUOUS PRN
Status: DISCONTINUED | OUTPATIENT
Start: 2025-07-27 | End: 2025-07-27

## 2025-07-27 RX ORDER — AMOXICILLIN 250 MG
1 CAPSULE ORAL 2 TIMES DAILY
Status: DISCONTINUED | OUTPATIENT
Start: 2025-07-27 | End: 2025-07-29

## 2025-07-27 RX ORDER — PROPOFOL 10 MG/ML
INJECTION, EMULSION INTRAVENOUS CONTINUOUS PRN
Status: DISCONTINUED | OUTPATIENT
Start: 2025-07-27 | End: 2025-07-27

## 2025-07-27 RX ADMIN — SODIUM CHLORIDE, SODIUM LACTATE, POTASSIUM CHLORIDE, AND CALCIUM CHLORIDE: .6; .31; .03; .02 INJECTION, SOLUTION INTRAVENOUS at 07:05

## 2025-07-27 RX ADMIN — CEFAZOLIN SODIUM 2 G: 2 SOLUTION INTRAVENOUS at 22:14

## 2025-07-27 RX ADMIN — BUPIVACAINE HYDROCHLORIDE 10 ML: 5 INJECTION, SOLUTION EPIDURAL; INTRACAUDAL; PERINEURAL at 06:58

## 2025-07-27 RX ADMIN — PROPOFOL 150 MCG/KG/MIN: 10 INJECTION, EMULSION INTRAVENOUS at 07:15

## 2025-07-27 RX ADMIN — ACETAMINOPHEN, ASPIRIN, CAFFEINE 1 TABLET: 250; 65; 250 TABLET, FILM COATED ORAL at 22:08

## 2025-07-27 RX ADMIN — OXYCODONE HYDROCHLORIDE 5 MG: 5 TABLET ORAL at 20:04

## 2025-07-27 RX ADMIN — LISINOPRIL 10 MG: 10 TABLET ORAL at 22:05

## 2025-07-27 RX ADMIN — ACETAMINOPHEN 975 MG: 325 TABLET ORAL at 18:48

## 2025-07-27 RX ADMIN — CEFAZOLIN SODIUM 2 G: 2 SOLUTION INTRAVENOUS at 14:55

## 2025-07-27 RX ADMIN — IBUPROFEN 600 MG: 600 TABLET ORAL at 14:55

## 2025-07-27 RX ADMIN — HYDROMORPHONE HYDROCHLORIDE 0.5 MG: 1 INJECTION, SOLUTION INTRAMUSCULAR; INTRAVENOUS; SUBCUTANEOUS at 08:07

## 2025-07-27 RX ADMIN — PHENYLEPHRINE HYDROCHLORIDE 50 MCG: 10 INJECTION INTRAVENOUS at 07:51

## 2025-07-27 RX ADMIN — KETOROLAC TROMETHAMINE 30 MG: 30 INJECTION, SOLUTION INTRAMUSCULAR at 08:07

## 2025-07-27 RX ADMIN — ONDANSETRON 4 MG: 2 INJECTION INTRAMUSCULAR; INTRAVENOUS at 07:24

## 2025-07-27 RX ADMIN — SIMVASTATIN 20 MG: 20 TABLET, FILM COATED ORAL at 22:05

## 2025-07-27 RX ADMIN — PHENYLEPHRINE HYDROCHLORIDE 50 MCG: 10 INJECTION INTRAVENOUS at 07:58

## 2025-07-27 RX ADMIN — PROPOFOL 50 MG: 10 INJECTION, EMULSION INTRAVENOUS at 08:01

## 2025-07-27 RX ADMIN — CEFTRIAXONE 2 G: 2 INJECTION, POWDER, FOR SOLUTION INTRAMUSCULAR; INTRAVENOUS at 07:24

## 2025-07-27 RX ADMIN — BUPIVACAINE HYDROCHLORIDE 10 ML: 5 INJECTION, SOLUTION EPIDURAL; INTRACAUDAL; PERINEURAL at 07:00

## 2025-07-27 RX ADMIN — METFORMIN HYDROCHLORIDE 1000 MG: 500 TABLET ORAL at 18:47

## 2025-07-27 RX ADMIN — ASPIRIN 81 MG: 81 TABLET, DELAYED RELEASE ORAL at 18:48

## 2025-07-27 RX ADMIN — SENNOSIDES AND DOCUSATE SODIUM 1 TABLET: 50; 8.6 TABLET ORAL at 10:54

## 2025-07-27 RX ADMIN — PHENYLEPHRINE HYDROCHLORIDE 100 MCG: 10 INJECTION INTRAVENOUS at 07:32

## 2025-07-27 RX ADMIN — PROPOFOL 200 MG: 10 INJECTION, EMULSION INTRAVENOUS at 07:15

## 2025-07-27 RX ADMIN — PHENYLEPHRINE HYDROCHLORIDE 100 MCG: 10 INJECTION INTRAVENOUS at 07:38

## 2025-07-27 RX ADMIN — PHENYLEPHRINE HYDROCHLORIDE 100 MCG: 10 INJECTION INTRAVENOUS at 07:48

## 2025-07-27 RX ADMIN — PHENYLEPHRINE HYDROCHLORIDE 100 MCG: 10 INJECTION INTRAVENOUS at 07:36

## 2025-07-27 RX ADMIN — ACETAMINOPHEN 1000 MG: 500 TABLET, FILM COATED ORAL at 14:55

## 2025-07-27 RX ADMIN — ACETAMINOPHEN 975 MG: 325 TABLET ORAL at 10:53

## 2025-07-27 RX ADMIN — HYDROMORPHONE HYDROCHLORIDE 0.5 MG: 1 INJECTION, SOLUTION INTRAMUSCULAR; INTRAVENOUS; SUBCUTANEOUS at 05:27

## 2025-07-27 RX ADMIN — SENNOSIDES AND DOCUSATE SODIUM 1 TABLET: 50; 8.6 TABLET ORAL at 20:04

## 2025-07-27 RX ADMIN — Medication 2 G: at 07:11

## 2025-07-27 RX ADMIN — HYDROMORPHONE HYDROCHLORIDE 0.5 MG: 1 INJECTION, SOLUTION INTRAMUSCULAR; INTRAVENOUS; SUBCUTANEOUS at 00:55

## 2025-07-27 RX ADMIN — OXYCODONE HYDROCHLORIDE 5 MG: 5 TABLET ORAL at 09:00

## 2025-07-27 RX ADMIN — SODIUM CHLORIDE, POTASSIUM CHLORIDE, SODIUM LACTATE AND CALCIUM CHLORIDE: 600; 310; 30; 20 INJECTION, SOLUTION INTRAVENOUS at 10:54

## 2025-07-27 RX ADMIN — PHENYLEPHRINE HYDROCHLORIDE 100 MCG: 10 INJECTION INTRAVENOUS at 07:34

## 2025-07-27 RX ADMIN — PHENYLEPHRINE HYDROCHLORIDE 50 MCG: 10 INJECTION INTRAVENOUS at 07:53

## 2025-07-27 RX ADMIN — MIDAZOLAM 2 MG: 1 INJECTION INTRAMUSCULAR; INTRAVENOUS at 07:10

## 2025-07-27 ASSESSMENT — ACTIVITIES OF DAILY LIVING (ADL)
ADLS_ACUITY_SCORE: 27
ADLS_ACUITY_SCORE: 20
ADLS_ACUITY_SCORE: 27
ADLS_ACUITY_SCORE: 27
ADLS_ACUITY_SCORE: 19
ADLS_ACUITY_SCORE: 20
ADLS_ACUITY_SCORE: 19
ADLS_ACUITY_SCORE: 20
ADLS_ACUITY_SCORE: 27
ADLS_ACUITY_SCORE: 19
ADLS_ACUITY_SCORE: 19
ADLS_ACUITY_SCORE: 20
ADLS_ACUITY_SCORE: 27
ADLS_ACUITY_SCORE: 27
ADLS_ACUITY_SCORE: 20
ADLS_ACUITY_SCORE: 27
ADLS_ACUITY_SCORE: 19
ADLS_ACUITY_SCORE: 27
ADLS_ACUITY_SCORE: 27

## 2025-07-27 NOTE — PLAN OF CARE
Problem: Adult Inpatient Plan of Care  Goal: Plan of Care Review  Outcome: Progressing  Flowsheets (Taken 7/27/2025 1346)  Plan of Care Reviewed With: patient  Overall Patient Progress: no change     Problem: Pain Acute  Goal: Optimal Pain Control and Function  Outcome: Progressing  Intervention: Optimize Psychosocial Wellbeing  Flowsheets (Taken 7/27/2025 1346)  Supportive Measures: active listening utilized  Intervention: Develop Pain Management Plan  Flowsheets (Taken 7/27/2025 1346)  Pain Management Interventions:   medication (see MAR)   repositioned   rest  Intervention: Prevent or Manage Pain  Flowsheets (Taken 7/27/2025 1346)  Bowel Elimination Promotion:   adequate fluid intake promoted   diet adjusted  Medication Review/Management: medications reviewed     Goal Outcome Evaluation:    Patient A&O x4, very somnolent after surgery. Patient's pain well managed with PRN pain medications. Patient reported a headache upon arrival to unit, scheduled tylenol given with relief and PRN tylenol and ibuprofen given at end of shift. Patient more alert by end of shift, wife assisted staff to help patient into chair. Patient's VSS, afebrile, on RA. Patient's dressing to left shoulder CDI. Patient's IV infusing with LR at 75 mL/hr per MAR, Iv ABX q 8 hours. Patient's blood sugars have been stable this shift, no corrective dose needed. Patient been resting in bed this shift, wife at bedside. Will continue to monitor per plan of care.       Plan of Care Reviewed With: patient    Overall Patient Progress: no change

## 2025-07-27 NOTE — PROGRESS NOTES
Aox4. Had a HA. Tylenol ordered and then given which has helped it subside. Denied dizziness. Up walking the halls independently- walked quite a bit because he is feeling constipated.     Room air. RRR in no distress.     L. Arm is numb- he can squeeze slightly and wiggle the fingers a bit. At rest denies any pain in the shoulder. We boosted him in bed b/c he was having trouble and then he winced and said it hurt with the movement.     Ate all of dinner.  - no coverage per his protocol.

## 2025-07-27 NOTE — PROGRESS NOTES
[facility] TRANSPORT NOTE  Data:   Reason for Transport:  to surgery    Osmar Angel was transported to surgery via cart at 0700.  Patient was accompanied by Registered Nurse and CRNA. Equipment used for transport: None. Family was aware of reason for transport: yes    Action:  Report: given to Carmelina ARGUELLES    Response:  Patient's condition when transferred off unit was Stable.    Mary Schirmers, RN

## 2025-07-27 NOTE — ANESTHESIA PROCEDURE NOTES
Airway       Patient location during procedure: OR       Procedure Start/Stop Times: 7/27/2025 7:16 AM and 7/27/2025 7:16 AM  Staff -        CRNA: Henok Drummond APRN CRNA       Performed By: CRNA  Consent for Airway        Urgency: elective  Indications and Patient Condition       Indications for airway management: lisa-procedural       Induction type:intravenous       Mask difficulty assessment: 0 - not attempted    Final Airway Details       Final airway type: supraglottic airway    Supraglottic Airway Details        Type: LMA       Brand: Air-Q       LMA size: 5    Post intubation assessment        Placement verified by: capnometry and chest rise        Number of attempts at approach: 1       Number of other approaches attempted: 0       Ease of procedure: easy       Dentition: Intact and Unchanged    Medication(s) Administered   Medication Administration Time: 7/27/2025 7:16 AM

## 2025-07-27 NOTE — PROGRESS NOTES
Assisted patient with home CPAP set-up.  Patient will now be independent with CPAP during stay unless otherwise requested.

## 2025-07-27 NOTE — PROGRESS NOTES
[facility] TRANSPORT NOTE  Data:   Reason for Transport:  Return from surgery     Osmar Angel was transported from recovery via bed at 1035.  Patient was accompanied by Registered Nurse. Equipment used for transport: Oxygen  Nasal Cannula. Family was aware of reason for transport: yes    Action:  Report: received from BLANE Limon    Response:  Patient's condition upon return was stable.    Marilyn Miles RN

## 2025-07-27 NOTE — PROCEDURES
7/27/2025    PREOPERATIVE DIAGNOSIS:    1.  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  Possible left septic left shoulder glenohumeral arthritis    POSTOPERATIVE DIAGNOSIS:    1.  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  Possible left septic left shoulder glenohumeral arthritis    PROCEDURE:    1.  Open left shoulder AC joint irrigation and debridement with distal clavicle resection   2.  Arthroscopic left glenohumeral joint irrigation debridement with synovial biopsy    SURGEON: Jarvis Miles MD    ASSISTANT: Latricia Vasquez PA-c.  The presence of a PA was necessary for positioning retraction, and safe progression of the case    ANESTHESIA: Block with general    BLOOD LOSS: 25 cc    COMPLICATIONS: None apparent    DISPOSITION: Stable to PACU    IMPLANTS: None    INDICATIONS: Price is a 55 year old male who presented to the ER with a three 2 to 3-day history of worsening left shoulder pain.  Interestingly, he had recently been camping and removed a tick due to multiple arthralgias initially, there is concern about Lyme's disease.  He is therefore empirically treated with doxycycline on 7/23/2025.  However, his shoulder became the source of most of his pain.  He seen in the ER and his inflammatory markers were markedly elevated and increasing.  Clinically, there is concerns about a septic shoulder he has significant pain with any range of motion including even gentle internal or external rotation.  Additionally, he is markedly tender over the AC joint.  We obtain advanced imaging with a CT scan and MRI.  This did show a shoulder effusion, although it did show significant synovitis rounding his AC joint as well as edema throughout his distal clavicle.  He also had a full-thickness tear of his rotator cuff on his MRI, although prior to his two 3-day history of worsening shoulder pain his shoulder functioned quite well.  Given the  clinical picture, we are concerned about a septic left acromioclavicular joint as well as a possible septic left shoulder glenohumeral after discussing treatment options, he elected to proceed with an open AC joint irrigation and debridement with distal clavicle excision as well as arthroscopic glenohumeral joint irrigation and debridement to improve his pain, optimize function, and hopefully eradicate any underlying infection.  He understood he risks of ongoing pain, worsening infection, damage to vessels or nerves, and risks inherent to anesthesia.    PROCEDURE: Price was met in the preoperative holding area where the left was marked.  He was then transferred to the operating theater.  He had a block placed by anesthesia which he tolerated well.  After smooth induction of general anestehsia, he was positioned beach chair with all bony prominences well padded and his had in a neutral position.  A timeout was performed verifying the correct patient, surgery, and location.  He received preoperative antibiotics.  The left shoulder was then prepped and draped in a standard sterile fashion.    We started with a left shoulder arthroscopy.  We made a small posterior lateral viewing portal followed by an anterior portal in the rotator interval under spinal needle visualization.  When he got into his joint, there was no effusion.  There was mild erythematous synovial tissue.  The glenohumeral chondral surfaces were intact.  The subscapularis was intact.  There was a 1 cm tear of the distal anterior supraspinatus.  The biceps tendon was intact with minimal erythema.  There was some degenerative fraying of the anterior and superior labrum with surrounding irritated synovial tissue.  I had performed the diagnostic arthroscopy, we thoroughly irrigated the joint with 2 L of normal saline via the arthroscopic tubing.  We also took a sample of the erythematous synovial tissue and sent it for aerobic and anaerobic culture.  We then  entered the subacromial space posteriorly.  We made a second lateral portal via spinal needle exacerbation.  From the subacromial side, again, there is a 1 cm tear of the distal anterior supraspinatus.  There is significant hypertrophic bursal tissue in the subacromial space.  This was debrided with a combination of a shaver and an ablator.    We then turned our attention to his AC joint.  We made a 3 cm incision centered over his AC joint.  Dissection sharply down through the skin and subcutaneous tissue.  The soft tissues and capsule were markedly swollen and erythematous.  We therefore sharply debrided 3 cm of the subcutaneous tissue and capsule using a scalpel.  We then identified the AC joint with a spinal needle.  We resected about 4 mm of the distal clavicle using an oscillating saw.  We used a rongeur to remove the bone as well as soft tissue from the AC joint.  We sent some of the soft tissue for aerobic and anaerobic culture.  The AC joint was then thoroughly irrigated with 2 L of normal saline via bulb syringe.  Scope portals were closed with 2-0 nylon in a procedure incision was closed with 2-0 Monocryl followed by running 3-0 Monocryl.  A sling was applied and can was awoke from anesthesia and transferred to recovery room in stable condition    POSTOPERATIVE PLAN:    1.  Continue IV ceftriaxone.  Will continue to follow cultures   2.  Activities as tolerated the left shoulder.  He may wear a sling for comfort but come out of the sling and slowly advance as pain allows  3.  DVT prophylaxis: Early ambulation  4.  Return to clinic in 2 weeks for a wound check, sooner with questions or concerns    CAL MOORE MD

## 2025-07-27 NOTE — ANESTHESIA POSTPROCEDURE EVALUATION
Patient: Osmar Angel    Procedure: Procedure(s):  IRRIGATION AND DEBRIDEMENT, SHOULDER  ARTHROSCOPY, SHOULDER, WITH DISTAL CLAVICLE EXCISION       Anesthesia Type:  General    Note:  Disposition: Inpatient   Postop Pain Control: Uneventful            Sign Out: Well controlled pain   PONV: No   Neuro/Psych: Uneventful            Sign Out: Acceptable/Baseline neuro status   Airway/Respiratory: Uneventful            Sign Out: Acceptable/Baseline resp. status   CV/Hemodynamics: Uneventful            Sign Out: Acceptable CV status; No obvious hypovolemia; No obvious fluid overload   Other NRE: NONE   DID A NON-ROUTINE EVENT OCCUR? No           Last vitals:  Vitals Value Taken Time   /93 07/27/25 09:00   Temp 36.6  C (97.9  F) 07/27/25 09:00   Pulse 101 07/27/25 09:04   Resp 9 07/27/25 09:04   SpO2 95 % 07/27/25 09:04   Vitals shown include unfiled device data.    Electronically Signed By: NIURKA Gamble CRNA  July 27, 2025  11:26 AM

## 2025-07-27 NOTE — PLAN OF CARE
"  Problem: Pain Acute  Goal: Optimal Pain Control and Function  Outcome: Not Progressing   Goal Outcome Evaluation:  Patient awoke during nite uncomfortable \"I am in pain.\"  Tylenol only ordered since patient had block approximately 13 hrs previously.  Tele-Med physician Suresh notified  0.5 mg IV Dilaudid administered by charge nurse    CHG bath completed last nite  CHG bath completed this am  Pre Op Checklist started  Patient has been NPO since [facility]                          "

## 2025-07-27 NOTE — ANESTHESIA PROCEDURE NOTES
"Brachial plexus Procedure Note    Pre-Procedure   Staff -        CRNA: Henok Drummond APRN CRNA       Performed By: CRNA       Location: OR       Procedure Start/Stop Times: 7/27/2025 6:57 AM and 7/27/2025 7:04 AM       Pre-Anesthestic Checklist: patient identified, IV checked, site marked, risks and benefits discussed, informed consent, monitors and equipment checked, pre-op evaluation, at physician/surgeon's request and post-op pain management  Timeout:       Correct Patient: Yes        Correct Procedure: Yes        Correct Site: Yes        Correct Position: Yes        Correct Laterality: Yes        Site Marked: Yes  Procedure Documentation  Procedure: Brachial plexus         Laterality: left       Patient Position: supine       Skin prep: Chloraprep (interscalene approach).       Needle Type: insulated       Needle Gauge: 22.        Needle Length (millimeters): 50        Ultrasound guided       1. Ultrasound was used to identify targeted nerve, plexus, vascular marker, or fascial plane and place a needle adjacent to it in real-time.       2. Ultrasound was used to visualize the spread of anesthetic in close proximity to the above referenced structure.       3. A permanent image is entered into the patient's record.       4. The visualized anatomic structures appeared normal.       5. There were no apparent abnormal pathologic findings.    Assessment/Narrative         The placement was negative for: blood aspirated, painful injection and site bleeding       Paresthesias: No and Resolved.       Bolus given via needle..        Secured via.        Insertion/Infusion Method: Single Shot       Complications: none    Medication(s) Administered   Medication Administration Time: 7/27/2025 6:57 AM      FOR [facility] ([facility] [place] ONLY:   Pain Team Contact information: please page the Pain Team Via [employer]. Search \"Pain\". During daytime hours, please page the attending first. At night please page the resident " first.

## 2025-07-27 NOTE — PROGRESS NOTES
[facility] Medicine Progress Note  Date of Service: 07/27/2025    Assessment & Plan   Osmar Angel is a 55 year old male with past medical history significant for diabetes mellitus type 2, hypertension, hyperlipidemia, VERONA who now presents on 7/26/2025 with 5 days of progressive left shoulder pain.    Acute left shoulder arthritis, suspect septic     Progressive severe pain with ROM of left shoulder over 5 days associated with multiple joint arthralgias, transient swelling of right wrist which resolved, malaise and generalized weakness without fever or chills. No known trauma. No gout history. Seen at OSH clinic/ED and treated empirically with doxycycline for possible Lyme disease (negative initial serology) and negative tick PCR panel. No improvement after 3 days of doxycycline. , WBC 11.5. CT left shoulder showed AC joint effusion with synovial enhancement. Arthrocentesis of the GH joint in the ED produced only a few drops of fluid for culture, not enough for also doing crystal analysis.     Orthopedic surgery recommended MRI of the shoulder which showed soft tissue inflammatory changes but no large effusion, and posterior supraspinatus tear. Orthopedic surgery consulted and given the exquisite pain with any ROM of the shoulder, recommended arthroscopic surgery for wash out in AM 7/27.     Brachial plexus block placed by CRNA with good analgesic effect, wore off prior to surgery.    DDx includes septic arthritis, inflammatory arthritis, and crystal induced arthritis. Urine GC PCR returned negative but may not rule out disseminated GC. Ceftriaxone 2g q 24h started on admission.     Initial aspirate from glenohumeral joint in ED showing negative Gram stain and NGTD.    Intraoperative samples sent for culture 7/27 AM    Blood Cx NGTD   - continue ceftriaxone 2g IV q24h   - virtual ID consult order placed for Monday    H/o GC/Chlamydia primary infection April 2025     "Treated with IM ceftriaxone and 7 days of doxycycline and symptoms resolved and have not returned. Sexual partner also treated. Per patient, wife was not exposed.     Urine GC/Chlamydia PCR negative this admission. (Have not had a chance to discuss this result privately with patient 7/27)    Type 2 diabetes mellitus without complications      Well-managed PTA with metformin and semaglutide.    - continue metformin    - hold semaglutide while hospitalized   - accuchecks and medium sliding scale insulin       Hypertension goal BP (blood pressure) < 140/90    Started on lisinopril 2014 for 's/90's given has diabetes mellitus.     BP elevated 160's due to pain and stress.    - continue lisinopril   - pain control    Hyperlipidemia LDL goal <100   - Continue simvastatin    VERONA (obstructive sleep apnea)-AHI 71    Clinically Significant Risk Factors         # Hyponatremia: Lowest Na = 133 mmol/L in last 2 days, will monitor as appropriate           # Hypertension: Noted on problem list           # DMII: A1C = 6.6 % (Ref range: 0.0 - 5.6 %) within past 6 months, PRESENT ON ADMISSION  # Overweight: Estimated body mass index is 29.95 kg/m  as calculated from the following:    Height as of 7/23/25: 1.854 m (6' 1\").    Weight as of this encounter: 103 kg (227 lb)., PRESENT ON ADMISSION            Diet: Advance Diet as Tolerated: Regular Diet Adult    DVT Prophylaxis: Low Risk/Ambulatory with no VTE prophylaxis indicated  Corley Catheter: Not present  Lines: None     Cardiac Monitoring: ACTIVE order. Indication: Procedural area  Code Status: Full Code      Discussion/Disposition: Recovering post op. Awaiting cultures.     Medically Ready for Discharge: Anticipated in 2-4 Days         Medical Decision Making       40 MINUTES SPENT BY ME on the date of service doing chart review, history, exam, documentation & further activities per the note.        Elias Knapp MD  [facility] Medicine    [facility] " "Center  Securely message with [employer] (more info)  Text page via [employer] Paging/Directory     Interval History   (Patient seen in recovery post op.)  C/o feeling \"sore\".  Reviewed h/o GC last April: Completed treatment and symptoms resolved and have not returned. Sexual partner also was treated. Has not had intercourse with wife in a year and she does not know about the infection. He is aware that this infection could be related and retesting is pending.     Physical Exam   Temp:  [97.9  F (36.6  C)-98.8  F (37.1  C)] 97.9  F (36.6  C)  Pulse:  [] 102  Resp:  [14-23] 23  BP: (138-160)/(84-99) 151/93  SpO2:  [92 %-97 %] 93 %    Weights:   Vitals:    07/26/25 0229   Weight: 103 kg (227 lb)    Body mass index is 29.95 kg/m .    Constitutional: awake though tired appearing post op, oriented, responds appropriately and asks appropriate questions.     Data   Recent Labs   Lab 07/27/25  0834 07/27/25  0510 07/27/25  0219 07/26/25  1651 07/26/25  0354 07/23/25  1000   WBC  --  10.2  --   --  11.5* 10.9   HGB  --  13.5  --   --  13.8 14.9   MCV  --  90  --   --  88 89   PLT  --  282  --   --  240 213   NA  --  136  --   --  133* 137   POTASSIUM  --  4.5  --   --  4.4 5.0   CHLORIDE  --  98  --   --  98 101   CO2  --  23  --   --  21* 26   BUN  --  14.5  --   --  18.2 17.7   CR  --  0.74  --   --  0.76 0.89   ANIONGAP  --  15  --   --  14 10   ROOPA  --  9.6  --   --  9.5 9.8   * 132* 171*   < > 143* 147*   ALBUMIN  --   --   --   --   --  4.2   PROTTOTAL  --   --   --   --   --  7.2   BILITOTAL  --   --   --   --   --  0.5   ALKPHOS  --   --   --   --   --  46   ALT  --   --   --   --   --  20   AST  --   --   --   --   --  25    < > = values in this interval not displayed.       Recent Labs   Lab 07/27/25  0834 07/27/25  0510 07/27/25  0219 07/26/25  2212 07/26/25  1651 07/26/25  0354   * 132* 171* 133* 160* 143*        Unresulted Labs Ordered in the Past 30 Days of this Admission       Date and Time Order " Name Status Description    7/27/2025  8:00 AM Tissue Aerobic Bacterial Culture Routine In process     7/27/2025  8:00 AM Anaerobic Bacterial Culture Routine In process     7/27/2025  7:51 AM Tissue Aerobic Bacterial Culture Routine In process     7/27/2025  7:51 AM Anaerobic Bacterial Culture Routine In process     7/26/2025  7:48 AM Blood Culture Peripheral blood (BC) Arm, Right Preliminary     7/26/2025  7:48 AM Blood Culture Peripheral blood (BC) Arm, Right Preliminary     7/26/2025  7:43 AM Treponema Abs w Reflex to RPR and Titer In process     7/26/2025  7:41 AM Neisseria gonorrhoea PCR In process     7/26/2025  7:41 AM Chlamydia trachomatis PCR In process     7/26/2025  7:07 AM Synovial fluid Aerobic Bacterial Culture Routine With Gram Stain Preliminary              Imaging:   Recent Results (from the past 24 hours)   MR Shoulder Left w/o & w Contrast    Narrative    EXAM: MR SHOULDER LEFT W/O and W CONTRAST  LOCATION: [facility]  DATE: 7/26/2025    INDICATION: Acute monoarthritis.  COMPARISON: None.  TECHNIQUE: Routine. Additional postgadolinium T1 sequences were obtained.  IV CONTRAST: 10 mL Gadavist    FINDINGS:    ROTATOR CUFF:  -Supraspinatus: Full-thickness tearing within the supraspinatus extends into the infraspinatus junction. The area of full-thickness tearing measures on the order of 2.1 cm with 1.7 cm of retraction. No significant atrophy.  -Infraspinatus: As stated, the area of full-thickness tearing extends to the anterior margin of the infraspinatus. There is tendinopathy within the remainder of the tendon attachment. No retraction or atrophy.  -Subscapularis: Mild to moderate severity tendinopathy without tearing. No retraction or atrophy.  -Teres minor: No tendon tear, tendinopathy or fatty atrophy.    CORACOACROMIAL ARCH:  -Morphology: Type II acromion. No subacromial spur. Subacromial and subcoracoid space are normal.   -Bursa: Trace fluid in the  subacromial-subdeltoid bursa. The coracoacromial and coracoclavicular ligaments are negative.    ACROMIOCLAVICULAR JOINT:   -Minimal diastasis of the AC joint interval. Mild hypertrophic change. Mild reactive edema distal clavicle.     LONG HEAD OF BICEPS TENDON:   -No tendinopathy or tear. No tenosynovitis or subluxation.    GLENOHUMERAL JOINT:   -Labrum: No labral tear. No paralabral cyst.   -Cartilage: Mild grade II cartilage thinning both sides of the joint.   -Joint space: No effusion or synovitis.  -Glenohumeral ligaments and capsule: No pericapsular inflammation.    BONES:   -No fracture or concerning marrow replacing lesion.    SOFT TISSUES:   -There is considerable T2 signal abnormality within the subcutaneous soft tissues superior to the distal clavicle and AC joint as well as posterior to the acromion. This could be due to direct impact injury/contusion or reactive. There is also signal   abnormality within the anterior deltoid which could be due to muscle strain.      Impression    IMPRESSION:  1.  Full-thickness tear of the central to posterior supraspinatus extending into the infraspinatus junction. The full-thickness tear measures approximately 2.1 cm in AP dimension with 1.7 cm of retraction but no significant atrophy.  2.  Mild to moderate severity subscapularis tendinopathy without tearing.  3.  Tendinopathy within the remainder of the infraspinatus tendon.  4.  Minimal diastasis of the AC joint interval with mild hypertrophic change and reactive edema distal clavicle. This could be secondary to a more acute AC separation-type injury. The coracoacromial and coracoclavicular ligaments remain intact, but there   is considerable surrounding soft tissue edema and/or inflammation within the distal trapezius and subcutaneous soft tissues superior to the distal clavicle and AC joint.  5.  Separate area of signal abnormality within the anterior deltoid could be due to direct impact injury or muscle  strain.  6.  No evidence for displaced fracture.  7.  No evidence for labral tear.  8.  Mild grade II cartilage loss both sides of the glenohumeral joint.   POC US GUIDANCE NEEDLE PLACEMENT    Impression    normal   POC US GUIDANCE NEEDLE PLACEMENT    Impression    Normal anatomy         I reviewed all new labs and imaging results over the last 24 hours. I personally reviewed no images or EKG's today.    Medications   Current Facility-Administered Medications   Medication Dose Route Frequency Provider Last Rate Last Admin    lactated ringers infusion   Intravenous Continuous Henok Drummond APRN CRNA         Current Facility-Administered Medications   Medication Dose Route Frequency Provider Last Rate Last Admin    [Auto Hold] cefTRIAXone (ROCEPHIN) 2 g vial to attach to  ml bag for ADULTS or NS 50 ml bag for PEDS  2 g Intravenous Q24H Elias Knapp MD   2 g at 07/27/25 0724    [Auto Hold] insulin aspart (NovoLOG) injection (RAPID ACTING)  2-7 Units Subcutaneous TID AC Elias Knapp MD        [Auto Hold] insulin aspart (NovoLOG) injection (RAPID ACTING)  1-5 Units Subcutaneous At Bedtime Elias Knapp MD        [Auto Hold] lisinopril (ZESTRIL) tablet 10 mg  10 mg Oral At Bedtime Elias Knapp MD   10 mg at 07/26/25 2109    [Held by provider] metFORMIN (GLUCOPHAGE) tablet 1,000 mg  1,000 mg Oral BID w/meals Elias Knapp MD   1,000 mg at 07/26/25 1737    [Auto Hold] simvastatin (ZOCOR) tablet 20 mg  20 mg Oral At Bedtime Elias Knapp MD   20 mg at 07/26/25 2109       Elias Knapp MD  [facility]

## 2025-07-27 NOTE — ANESTHESIA CARE TRANSFER NOTE
Patient: Osmar Angel    Procedure: Procedure(s):  IRRIGATION AND DEBRIDEMENT, SHOULDER  ARTHROSCOPY, SHOULDER, WITH DISTAL CLAVICLE EXCISION       Diagnosis: Monoarthritis, shoulder region, left [M13.112]  Diagnosis Additional Information: No value filed.    Anesthesia Type:   General     Note:    Oropharynx: oropharynx clear of all foreign objects  Level of Consciousness: awake  Oxygen Supplementation: room air    Independent Airway: airway patency satisfactory and stable  Dentition: dentition unchanged  Vital Signs Stable: post-procedure vital signs reviewed and stable  Report to RN Given: handoff report given  Patient transferred to: PACU    Handoff Report: Identifed the Patient, Identified the Reponsible Provider, Reviewed the pertinent medical history, Discussed the surgical course, Reviewed Intra-OP anesthesia mangement and issues during anesthesia, Set expectations for post-procedure period and Allowed opportunity for questions and acknowledgement of understanding      Vitals:  Vitals Value Taken Time   /99 07/27/25 08:30   Temp     Pulse 107 07/27/25 08:33   Resp 16 07/27/25 08:33   SpO2 94 % 07/27/25 08:33   Vitals shown include unfiled device data.    Electronically Signed By: NIURKA Gamble CRNA  July 27, 2025  8:34 AM

## 2025-07-27 NOTE — BRIEF OP NOTE
[facility]    Brief Operative Note    Pre-operative diagnosis: Left shoulder AC joint and glenohumeral joint septic arthritis  Post-operative diagnosis Same as pre-operative diagnosis    Procedure: IRRIGATION AND DEBRIDEMENT, SHOULDER, Left - Shoulder  ARTHROSCOPY, SHOULDER, WITH DISTAL CLAVICLE EXCISION, Left - Shoulder    Surgeon: Surgeons and Role:     * Jarvis Miles MD - Primary     * Latricia Vasquez PA-C - Assisting  Anesthesia: Choice with Block   Estimated Blood Loss: Less than 50 ml    Drains: None  Specimens:   ID Type Source Tests Collected by Time Destination   A : glenohumeral joint synovial tissue Tissue Shoulder, Left ANAEROBIC BACTERIAL CULTURE ROUTINE, AEROBIC BACTERIAL CULTURE ROUTINE Jarvis Miles MD 7/27/2025  7:49 AM    B : left AC joint Tissue Shoulder, Left ANAEROBIC BACTERIAL CULTURE ROUTINE, AEROBIC BACTERIAL CULTURE ROUTINE Jarvis Miles MD 7/27/2025  7:59 AM      Findings:   None.  Complications: None.  Implants: * No implants in log *    Will monitor cultures.  Cont ceftriaxone for now  Sling for comfort.  Gentle range of motion of left shoulder    Jarvis Miles MD

## 2025-07-28 ENCOUNTER — APPOINTMENT (OUTPATIENT)
Dept: OCCUPATIONAL THERAPY | Facility: CLINIC | Age: 55
DRG: 516 | End: 2025-07-28
Payer: COMMERCIAL

## 2025-07-28 LAB
CRP SERPL-MCNC: 76.55 MG/L
GLUCOSE BLDC GLUCOMTR-MCNC: 112 MG/DL (ref 70–99)
GLUCOSE BLDC GLUCOMTR-MCNC: 134 MG/DL (ref 70–99)
GLUCOSE BLDC GLUCOMTR-MCNC: 138 MG/DL (ref 70–99)
GLUCOSE BLDC GLUCOMTR-MCNC: 164 MG/DL (ref 70–99)
GLUCOSE BLDC GLUCOMTR-MCNC: 171 MG/DL (ref 70–99)
HGB BLD-MCNC: 13 G/DL (ref 13.3–17.7)
HOLD SPECIMEN: NORMAL
MCV RBC AUTO: 89 FL (ref 78–100)

## 2025-07-28 PROCEDURE — 250N000011 HC RX IP 250 OP 636: Performed by: ORTHOPAEDIC SURGERY

## 2025-07-28 PROCEDURE — 250N000013 HC RX MED GY IP 250 OP 250 PS 637: Performed by: STUDENT IN AN ORGANIZED HEALTH CARE EDUCATION/TRAINING PROGRAM

## 2025-07-28 PROCEDURE — 85018 HEMOGLOBIN: CPT | Performed by: ORTHOPAEDIC SURGERY

## 2025-07-28 PROCEDURE — 36415 COLL VENOUS BLD VENIPUNCTURE: CPT | Performed by: ORTHOPAEDIC SURGERY

## 2025-07-28 PROCEDURE — 250N000013 HC RX MED GY IP 250 OP 250 PS 637: Performed by: ORTHOPAEDIC SURGERY

## 2025-07-28 PROCEDURE — 97535 SELF CARE MNGMENT TRAINING: CPT | Mod: GO

## 2025-07-28 PROCEDURE — 120N000001 HC R&B MED SURG/OB

## 2025-07-28 PROCEDURE — 250N000013 HC RX MED GY IP 250 OP 250 PS 637

## 2025-07-28 PROCEDURE — 97165 OT EVAL LOW COMPLEX 30 MIN: CPT | Mod: GO

## 2025-07-28 PROCEDURE — 250N000013 HC RX MED GY IP 250 OP 250 PS 637: Performed by: INTERNAL MEDICINE

## 2025-07-28 PROCEDURE — 86140 C-REACTIVE PROTEIN: CPT | Performed by: STUDENT IN AN ORGANIZED HEALTH CARE EDUCATION/TRAINING PROGRAM

## 2025-07-28 PROCEDURE — 99232 SBSQ HOSP IP/OBS MODERATE 35: CPT | Performed by: STUDENT IN AN ORGANIZED HEALTH CARE EDUCATION/TRAINING PROGRAM

## 2025-07-28 PROCEDURE — 99222 1ST HOSP IP/OBS MODERATE 55: CPT | Performed by: STUDENT IN AN ORGANIZED HEALTH CARE EDUCATION/TRAINING PROGRAM

## 2025-07-28 RX ORDER — DOXYCYCLINE 100 MG/1
100 CAPSULE ORAL EVERY 12 HOURS SCHEDULED
Status: DISCONTINUED | OUTPATIENT
Start: 2025-07-28 | End: 2025-07-28

## 2025-07-28 RX ADMIN — POLYETHYLENE GLYCOL 3350 17 G: 17 POWDER, FOR SOLUTION ORAL at 09:34

## 2025-07-28 RX ADMIN — HYDROMORPHONE HYDROCHLORIDE 0.4 MG: 0.2 INJECTION, SOLUTION INTRAMUSCULAR; INTRAVENOUS; SUBCUTANEOUS at 23:33

## 2025-07-28 RX ADMIN — ONDANSETRON 4 MG: 2 INJECTION, SOLUTION INTRAMUSCULAR; INTRAVENOUS at 02:13

## 2025-07-28 RX ADMIN — METFORMIN HYDROCHLORIDE 1000 MG: 500 TABLET ORAL at 16:43

## 2025-07-28 RX ADMIN — ASPIRIN 81 MG: 81 TABLET, DELAYED RELEASE ORAL at 09:35

## 2025-07-28 RX ADMIN — SIMVASTATIN 20 MG: 20 TABLET, FILM COATED ORAL at 20:17

## 2025-07-28 RX ADMIN — SENNOSIDES AND DOCUSATE SODIUM 1 TABLET: 50; 8.6 TABLET ORAL at 20:16

## 2025-07-28 RX ADMIN — LISINOPRIL 10 MG: 10 TABLET ORAL at 20:17

## 2025-07-28 RX ADMIN — DOXYCYCLINE HYCLATE 100 MG: 100 CAPSULE ORAL at 11:06

## 2025-07-28 RX ADMIN — SENNOSIDES AND DOCUSATE SODIUM 1 TABLET: 50; 8.6 TABLET ORAL at 20:17

## 2025-07-28 RX ADMIN — CEFTRIAXONE 2 G: 2 INJECTION, POWDER, FOR SOLUTION INTRAMUSCULAR; INTRAVENOUS at 09:36

## 2025-07-28 RX ADMIN — OXYCODONE HYDROCHLORIDE 10 MG: 5 TABLET ORAL at 01:18

## 2025-07-28 RX ADMIN — OXYCODONE HYDROCHLORIDE 10 MG: 5 TABLET ORAL at 20:17

## 2025-07-28 RX ADMIN — METFORMIN HYDROCHLORIDE 1000 MG: 500 TABLET ORAL at 09:35

## 2025-07-28 RX ADMIN — SENNOSIDES AND DOCUSATE SODIUM 1 TABLET: 50; 8.6 TABLET ORAL at 09:35

## 2025-07-28 RX ADMIN — ACETAMINOPHEN 975 MG: 325 TABLET ORAL at 09:34

## 2025-07-28 RX ADMIN — ACETAMINOPHEN 650 MG: 325 TABLET ORAL at 02:09

## 2025-07-28 RX ADMIN — ACETAMINOPHEN 975 MG: 325 TABLET ORAL at 18:33

## 2025-07-28 RX ADMIN — ACETAMINOPHEN, ASPIRIN, CAFFEINE 1 TABLET: 250; 65; 250 TABLET, FILM COATED ORAL at 12:17

## 2025-07-28 ASSESSMENT — ACTIVITIES OF DAILY LIVING (ADL)
ADLS_ACUITY_SCORE: 27
ADLS_ACUITY_SCORE: 26
ADLS_ACUITY_SCORE: 26
ADLS_ACUITY_SCORE: 27
ADLS_ACUITY_SCORE: 27
ADLS_ACUITY_SCORE: 26
ADLS_ACUITY_SCORE: 26
ADLS_ACUITY_SCORE: 27
ADLS_ACUITY_SCORE: 26
ADLS_ACUITY_SCORE: 27
ADLS_ACUITY_SCORE: 26
ADLS_ACUITY_SCORE: 27
ADLS_ACUITY_SCORE: 27
ADLS_ACUITY_SCORE: 26
ADLS_ACUITY_SCORE: 27
ADLS_ACUITY_SCORE: 27

## 2025-07-28 NOTE — CONSULTS
Telemedicine Visit - General ID Service: Consult Note      Patient:  Osmar Angel, Date of birth 1970, Medical record number [id number]  Date of Visit:  July 28, 2025         Assessment and Recommendations:   ID Problem List:  Possible septic left shoulder vs inflammatory change/injury    Recommendations:  Discontinue doxycycline  OK to continue ceftriaxone 2g IV q24h for now  If no growth in tissue cultures within 48 hours, would stop abx  Suspicion for infection is low at this point - krystian tear on imaging, no purulence in OR    Discussion:  55oy M with h/o DM2, HTN, HLD, recent gonorrhea and chlamydia (4/1/25, treated as was partner) who presented on 7/26 with acute left shoulder pain x 3-4 days.    My concern for krystian septic arthritis is low to mixed - I think the other arthralgias are a bit of a confounder here. He's had no fever, chills, or other obvious symptoms of infection. The arthrocentesis was bland (though the low fluid volume may make that hard to interpret), and there was no purulence seen on I&D.    For now, would stop doxycycline, continue ceftriaxone, and plan to stop abx if no growth occurs in tissue cultures within 48 hours.    Kanu Ambriz MD  Division of Infectious Diseases and International Medicine  P: [phone]    I spent at least 45 minutes on the day of this encounter on chart review, patient interview/exam, and note preparation. This visit was performed remotely as a telemedicine encounter using the [employer] telemedicine platform.        History of Present Illness:     55oy M with h/o DM2, HTN, HLD, recent gonorrhea and chlamydia (4/1/25, treated as was partner) who presented on 7/26 with acute left shoulder pain x 3-4 days.    Pt stated that he was diagnosed with Lyme disease 3-4 days prior to presentation and was prescribed doxycycline, though this did not provide relief. Of note, a tickborne PCR panel was negative on 7/23. He has had no rash or erythema. He denies any  preceding injury in the shoulder, stating that he took a nap feeling well, but when he woke he had diffuse arthralgias that were worse in his left shoulder. He denies any fevers, though has had weakness and fatigue with ongoing left shoulder pain.     In the ER, he was afebrile with WBC 11.5,  (previously 81 on 7/23). No joint effusion or swelling noted on exam. A shoulder arthrocentesis was performed and the fluid showed no organisms or WBCs on gram stain, no growth in culture at 1 day (though notably, only a small amount of fluid could be aspirated). Urine GC/Ct PCRs are negative. Blood cultures collected 7/26 show no growth to date. He was started empirically on ceftriaxone and doxycycline.    XR left shoulder was unremarkable. CT left shoulder demonstrated acromioclavicular joint effusion with synovial enhancement, possible septic vs inflammatory, with no abscess or osteomyelitis. MR left shoulder demonstrated a full-thickness tear of the central to posterior supraspinatus extending into the infraspinatus junction, along with subscapular tendinopathy, infraspinatus tendinopathy..  Orthopedics was consulted and performed arthroscopic I&D 7/27 - brief op note describes some erythematous synovial tissue but no effusion or purulence.         Review of Systems:     10-system ROS performed and negative unless otherwise noted above.         Current Antimicrobials     Current:  Ceftriaxone (7/26 - )  Doxycycline (7/28 - )    Prior:  Cefazolini (7/27, lisa-procedural)       Past Medical History:     Past Medical History:   Diagnosis Date    Amblyopia, unspecified     Myopia     Profound impairment, one eye, impairment level not further specified     left eye loss of direct vision    Sprain of ankle, unspecified site      Past Surgical History:   Procedure Laterality Date    ROTATOR CUFF REPAIR RT/LT      right arthroscopic    SURGICAL HISTORY OF -       right knee arthroscopy    SURGICAL HISTORY OF -        umbilical hernia repair    VASECTOMY      ZZC REPAIR ING HERNIA, INFANT,REDUC      bilateral          Family History:     Family History   Problem Relation Age of Onset    Diabetes Maternal Grandfather     JULIANNA. Paternal Grandmother     MEENA Paternal Grandfather     Muscular Dystrophy Paternal Grandfather     Cardiovascular Father         a. fib    Cerebrovascular Disease Father     Heart Disease Father         pacemaker    Asthma No family hx of     Hypertension No family hx of     Breast Cancer No family hx of     Cancer - colorectal No family hx of     Prostate Cancer No family hx of           Social History:     Social History     Socioeconomic History    Marital status:      Spouse name: Not on file    Number of children: Not on file    Years of education: Not on file    Highest education level: Not on file   Occupational History    Not on file   Tobacco Use    Smoking status: Former     Current packs/day: 0.00     Types: Cigarettes     Quit date: 2005     Years since quittin.4    Smokeless tobacco: Never   Vaping Use    Vaping status: Never Used   Substance and Sexual Activity    Alcohol use: Yes     Alcohol/week: 0.0 standard drinks of alcohol     Comment: rarely    Drug use: No    Sexual activity: Yes     Partners: Female     Birth control/protection: Surgical   Other Topics Concern     Service No    Blood Transfusions No    Caffeine Concern No    Occupational Exposure No    Hobby Hazards No    Sleep Concern Yes    Stress Concern No    Weight Concern No    Special Diet No    Back Care No    Exercise Yes    Bike Helmet Not Asked    Seat Belt Yes    Self-Exams Yes    Parent/sibling w/ CABG, MI or angioplasty before 65F 55M? No   Social History Narrative    Not on file     Social Drivers of Health     Financial Resource Strain: Low Risk  (2025)    Financial Resource Strain     Within the past 12 months, have you or your family members you live with been unable to get  utilities (heat, electricity) when it was really needed?: No   Food Insecurity: Low Risk  (7/26/2025)    Food Insecurity     Within the past 12 months, did you worry that your food would run out before you got money to buy more?: No     Within the past 12 months, did the food you bought just not last and you didn t have money to get more?: No   Transportation Needs: Low Risk  (7/26/2025)    Transportation Needs     Within the past 12 months, has lack of transportation kept you from medical appointments, getting your medicines, non-medical meetings or appointments, work, or from getting things that you need?: No   Physical Activity: Not on file   Stress: Not on file   Social Connections: Unknown (11/25/2022)    Received from [employer] [facility]    Social Connections     Frequency of Communication with Friends and Family: Not on file   Interpersonal Safety: Low Risk  (7/26/2025)    Interpersonal Safety     Do you feel physically and emotionally safe where you currently live?: Yes     Within the past 12 months, have you been hit, slapped, kicked or otherwise physically hurt by someone?: No     Within the past 12 months, have you been humiliated or emotionally abused in other ways by your partner or ex-partner?: No   Housing Stability: Low Risk  (7/26/2025)    Housing Stability     Do you have housing? : Yes     Are you worried about losing your housing?: No            Physical Exam:   Physical exam performed via the [employer] cart with bedside nursing assistance.    Ranges for vital signs:  Temp:  [97.3  F (36.3  C)-98.8  F (37.1  C)] 98.8  F (37.1  C)  Pulse:  [86-99] 98  Resp:  [16-18] 18  BP: (138-162)/(79-97) 153/91  SpO2:  [94 %-97 %] 95 %    Intake/Output Summary (Last 24 hours) at 7/28/2025 1558  Last data filed at 7/28/2025 1351  Gross per 24 hour   Intake 720 ml   Output --   Net 720 ml     Exam:   GENERAL:  well-developed, well-nourished, sitting in bed in no acute distress.    ENT:  Head is normocephalic, atraumatic.   EYES:  Eyes have anicteric sclerae.    LUNGS:  Breathing easily on room air  EXT: Extremities without edema.  SKIN:  No acute rashes on visible skin  NEUROLOGIC:  Grossly nonfocal.         Laboratory Data:   Reviewed.  Pertinent for:    Microbiology:  Culture   Date Value Ref Range Status   07/27/2025 No growth, less than 1 day  Preliminary   07/27/2025 No growth, less than 1 day  Preliminary   07/26/2025 No growth after 1 day  Preliminary   07/26/2025 No growth after 1 day  Preliminary   07/26/2025 No growth after 1 day  Preliminary   04/01/2025 No Growth  Final     Inflammatory Markers    Recent Labs   Lab Test 07/23/25  1000   SED 14     Metabolic Studies       Recent Labs   Lab Test 07/28/25  1115 07/28/25  0759 07/28/25  0219 07/27/25  2121 07/27/25  1632 07/27/25  1153 07/27/25  0834 07/27/25  0510 07/26/25  1651 07/26/25  0354 07/23/25  1000 12/27/24  0908 05/20/24  1740 05/03/23  0819 01/20/22  1121 01/20/22  1120   NA  --   --   --   --   --   --   --  136  --  133* 137  --  139 140  --  138   POTASSIUM  --   --   --   --   --   --   --  4.5  --  4.4 5.0  --  4.5 5.3  --  4.3   CHLORIDE  --   --   --   --   --   --   --  98  --  98 101  --  103 103  --  105   CO2  --   --   --   --   --   --   --  23  --  21* 26  --  22 27  --  29   ANIONGAP  --   --   --   --   --   --   --  15  --  14 10  --  14 10  --  4   BUN  --   --   --   --   --   --   --  14.5  --  18.2 17.7  --  16.1 17.9  --  15   CR  --   --   --   --   --   --   --  0.74  --  0.76 0.89  --  0.95 0.98  --  0.76   GFRESTIMATED  --   --   --   --   --   --   --  >90  --  >90 >90  --  >90 >90  --  >90   * 138* 171* 122* 141* 131*   < > 132*   < > 143* 147*  --  134* 183*   < > 203*   A1C  --   --   --   --   --   --   --   --   --   --  6.6*   < > 7.2* 7.5*   < >  --    ROOPA  --   --   --   --   --   --   --  9.6  --  9.5 9.8  --  9.7 9.8  --  9.2   CKT  --   --   --   --   --   --   --   --   --   79  --   --   --   --   --   --     < > = values in this interval not displayed.      Hepatic Studies    Recent Labs   Lab Test 07/23/25  1000 05/20/24  1740 05/03/23  0819 01/20/22  1120 01/20/21  0730 02/13/20  0800 12/05/18  0955   BILITOTAL 0.5 0.4 0.5 0.6 0.6  --  0.5   ALKPHOS 46 41 44 49 49  --  45   ALBUMIN 4.2 4.3 4.5 3.5 3.7  --  3.8   AST 25 28 23 23 15  --  14   ALT 20 29 28 54 35 32 29     Pancreatitis testing    Recent Labs   Lab Test 12/27/24  0908 05/20/24  1740 05/03/23  0819 01/20/22  1120 01/20/21  0730 02/13/20  0800   TRIG 75 56 50 55 60 72     Hematology Studies      Recent Labs   Lab Test 07/28/25  0517 07/27/25  0510 07/26/25  0354 07/23/25  1000   WBC  --  10.2 11.5* 10.9   ANEU  --   --  7.7  --    ALYM  --   --  2.0  --    LEVY  --   --  1.5*  --    AEOS  --   --  0.3  --    HGB 13.0* 13.5 13.8 14.9   HCT  --  41.1 41.1 44.9   PLT  --  282 240 213     Urine Studies     Recent Labs   Lab Test 07/26/25  0756 04/01/25  1142   URINEPH 6.0 6.0   NITRITE Negative Negative   LEUKEST Negative Small*   WBCU 1 25-50*         Latest Ref Rng & Units 7/27/2025     5:10 AM 7/26/2025     3:54 AM 7/23/2025    10:00 AM 5/20/2024     5:40 PM 5/3/2023     8:19 AM   Transplant Immunosuppression Labs   Creat 0.67 - 1.17 mg/dL 0.74  0.76  0.89  0.95  0.98    Urea Nitrogen 6.0 - 20.0 mg/dL 14.5  18.2  17.7  16.1  17.9    WBC 4.0 - 11.0 10e3/uL 10.2  11.5  10.9      Neutrophil %  67       ANEU 1.6 - 8.3 10e3/uL  7.7              Imaging:   MR Shoulder Left w/o & w Contrast  Result Date: 7/26/2025  IMPRESSION: 1.  Full-thickness tear of the central to posterior supraspinatus extending into the infraspinatus junction. The full-thickness tear measures approximately 2.1 cm in AP dimension with 1.7 cm of retraction but no significant atrophy. 2.  Mild to moderate severity subscapularis tendinopathy without tearing. 3.  Tendinopathy within the remainder of the infraspinatus tendon. 4.  Minimal diastasis of the AC joint  interval with mild hypertrophic change and reactive edema distal clavicle. This could be secondary to a more acute AC separation-type injury. The coracoacromial and coracoclavicular ligaments remain intact, but there  is considerable surrounding soft tissue edema and/or inflammation within the distal trapezius and subcutaneous soft tissues superior to the distal clavicle and AC joint. 5.  Separate area of signal abnormality within the anterior deltoid could be due to direct impact injury or muscle strain. 6.  No evidence for displaced fracture. 7.  No evidence for labral tear. 8.  Mild grade II cartilage loss both sides of the glenohumeral joint.    CT Shoulder Left w Contrast  Result Date: 7/26/2025  IMPRESSION: 1.  Acromioclavicular joint effusion with synovial enhancement and overlying soft tissue edema. This may represent septic arthritis or inflammatory arthritis. There is no evidence of osteomyelitis or abscess. 2.  Coronary artery atheromatous calcification.     XR Shoulder Left 2 Views  Result Date: 7/26/2025  IMPRESSION: Normal joint spaces and alignment. No fracture.    Video-Visit Details    Type of service:  Video Telemedicine Visit   Video Start Time: 4:30pm  Video End Time: 4:45pm  Originating Location (pt. Location): [facility]  Distant Location (provider location):  Offsite  Platform used for Video Visit: Debbie

## 2025-07-28 NOTE — PROGRESS NOTES
Took over form [id number]    During this time alert, up walking in room or sitting visiting with friends- no longer somnolent. GREENE continues- he is wondering if it is withdrawal from stopping taking the narcotics that he had been taking for about 5 days.     Co2 levels 34-39 with spot checks but has been on and off b/c he is moving and eating.   /83 (BP Location: Right arm)   Pulse 104   Temp 99.1  F (37.3  C) (Oral)   Resp 18   Wt 103 kg (227 lb)   SpO2 96%   BMI 29.95 kg/m

## 2025-07-28 NOTE — PLAN OF CARE
Problem: Pain Acute  Goal: Optimal Pain Control and Function  Outcome: Not Progressing  Intervention: Optimize Psychosocial Wellbeing  Recent Flowsheet Documentation  Taken 7/27/2025 2300 by Schirmers, Mary, RN  Supportive Measures: active listening utilized     Problem: Pain Acute  Goal: Optimal Pain Control and Function  Outcome: Not Progressing   Goal Outcome Evaluation:  Patient's  block wore off around midnoc.  Tylenol administered as ordered except 0200 dose 650 mg only administered D/T 975 mg would have been 4.175 mg of Tylenol in the hour frame of 23.5 hours.  Patient crying and mumbling to wife for his needs  Patient up to bathroom, voiding without difficulty,  Patient had 1 episode of emesis while in bathroom  IV Zofran administered immediately with relief.

## 2025-07-28 NOTE — PLAN OF CARE
Problem: Adult Inpatient Plan of Care  Goal: Plan of Care Review  7/28/2025 1237 by Marilyn Miles RN  Outcome: Progressing  Flowsheets (Taken 7/28/2025 1237)  Plan of Care Reviewed With:   patient   spouse  Overall Patient Progress: improving  7/28/2025 1237 by Marilyn Miles RN    Problem: Pain Acute  Goal: Optimal Pain Control and Function  Outcome: Progressing  Intervention: Optimize Psychosocial Wellbeing  Recent Flowsheet Documentation  Taken 7/28/2025 1237 by Marilyn Miles RN  Supportive Measures: active listening utilized  Intervention: Develop Pain Management Plan  Recent Flowsheet Documentation  Taken 7/28/2025 1237 by Marilyn Miles RN  Pain Management Interventions:   medication (see MAR)   repositioned   rest  Intervention: Prevent or Manage Pain  Recent Flowsheet Documentation  Taken 7/28/2025 1100 by Marilyn Miles RN  Bowel Elimination Promotion:   adequate fluid intake promoted   ambulation promoted  Medication Review/Management: medications reviewed     Problem: Infection  Goal: Absence of Infection Signs and Symptoms  Outcome: Progressing  Intervention: Prevent or Manage Infection  Flowsheets (Taken 7/28/2025 1237)  Fever Reduction/Comfort Measures: fluid intake increased     Problem: Shoulder Arthroplasty (Total, Leonardo, Reverse)  Goal: Optimal Coping  Outcome: Progressing  Intervention: Support Psychosocial Response to Surgery and Mobility Changes  Flowsheets (Taken 7/28/2025 1237)  Supportive Measures: active listening utilized  Goal: Absence of Bleeding  Outcome: Progressing  Intervention: Monitor and Manage Bleeding  Flowsheets (Taken 7/28/2025 1237)  Bleeding Management: dressing monitored  Goal: Effective Bowel Elimination  Outcome: Progressing  Intervention: Enhance Bowel Motility and Elimination  Flowsheets (Taken 7/28/2025 1237)  Bowel Motility Enhancement:   ambulation promoted   fluid intake encouraged  Goal: Fluid and Electrolyte Balance  Outcome:  Progressing  Intervention: Monitor and Manage Fluid and Electrolyte Balance  Flowsheets (Taken 7/28/2025 1237)  Fluid/Electrolyte Management: fluids provided  Goal: Optimal Functional Ability  Outcome: Progressing  Intervention: Promote Optimal Functional Status  Flowsheets (Taken 7/28/2025 1237)  Assistive Device Utilized: gait belt  Activity Management: activity adjusted per tolerance  Goal: Absence of Infection Signs and Symptoms  Outcome: Progressing  Intervention: Prevent or Manage Infection  Flowsheets (Taken 7/28/2025 1237)  Fever Reduction/Comfort Measures: fluid intake increased  Goal: Intact Neurovascular Status  Outcome: Progressing  Goal: Anesthesia/Sedation Recovery  Outcome: Progressing  Intervention: Optimize Anesthesia Recovery  Flowsheets  Taken 7/28/2025 1237  Safety Promotion/Fall Prevention:   activity supervised   assistive device/personal items within reach   clutter free environment maintained   nonskid shoes/slippers when out of bed   safety round/check completed  Goal: Optimal Pain Control and Function  Outcome: Progressing  Intervention: Prevent or Manage Pain  Flowsheets  Taken 7/28/2025 1237  Pain Management Interventions:   medication (see MAR)   repositioned   rest  Goal: Nausea and Vomiting Relief  Outcome: Progressing  Goal: Effective Urinary Elimination  Outcome: Progressing  Intervention: Monitor and Manage Urinary Retention  Flowsheets (Taken 7/28/2025 1237)  Urinary Elimination Promotion:   frequent voiding encouraged   toileting offered     Goal Outcome Evaluation:    Patient is A&O, able to make his needs known appropriately. Patient up with SBA. Patient only reports very minimal pain to left shoulder, scheduled tylenol given, no PRNs needed. Patient's VSS, afebrile, on RA. Dressing to left shoulder CDI. Patient had good appetite for both meals. Patient's IV infusing with LR at 75 mL/hr, intermittent IV ABX given. Patient's blood sugars have been stable this shift, no corrective  dose needed. Patient's wife at bedside for entirety of shift, very helpful and pleasant to work with. Patient reported headache, one dose of Excedrin migraine given with relief. Patient had two large, loose bowel movements today and is requesting to hold any additional doses today. Will continue to monitor per plan of care.       Plan of Care Reviewed With: patient, spouse    Overall Patient Progress: improving

## 2025-07-28 NOTE — PROGRESS NOTES
[facility] Medicine Progress Note  Date of Service: 07/28/2025    Assessment & Plan   Osmar Angel is a 55 year old male with past medical history significant for diabetes mellitus type 2, hypertension, hyperlipidemia, VERONA who now presents on 7/26/2025 with 5 days of progressive left shoulder pain.    Acute left shoulder arthritis, suspect septic     Progressive severe pain with ROM of left shoulder over 5 days associated with multiple joint arthralgias, transient swelling of right wrist which resolved, malaise and generalized weakness without fever or chills. No known trauma. No gout history. Seen at OSH clinic/ED and treated empirically with doxycycline for possible Lyme disease (negative initial serology) and negative tick PCR panel. No improvement after 3 days of doxycycline. , WBC 11.5. CT left shoulder showed AC joint effusion with synovial enhancement. Arthrocentesis of the GH joint in the ED produced only a few drops of fluid for culture, not enough for also doing crystal analysis.     Orthopedic surgery recommended MRI of the shoulder which showed soft tissue inflammatory changes but no large effusion, and posterior supraspinatus tear. Orthopedic surgery consulted and given the exquisite pain with any ROM of the shoulder, recommended arthroscopic surgery for wash out in AM 7/27.     Brachial plexus block placed by CRNA with good analgesic effect, wore off prior to surgery.    DDx includes septic arthritis, inflammatory arthritis, and crystal induced arthritis. Urine GC PCR returned negative but may not rule out disseminated GC. Ceftriaxone 2g q 24h started on admission.     Initial aspirate from glenohumeral joint in ED showing negative Gram stain and NGTD.    Intraoperative samples sent for culture 7/27 AM    Blood Cx NGTD  - Continue ceftriaxone 2g IV q24h  - Added doxycycline 100 mg BID  - Virtual ID consult order placed for Monday  - Added crystal ID  "analysis  - Awaiting final culture results    H/o GC/Chlamydia primary infection April 2025    Treated with IM ceftriaxone and 7 days of doxycycline and symptoms resolved and have not returned. Sexual partner also treated. Per patient, wife was not exposed.     Urine GC/Chlamydia PCR negative this admission. (Have not had a chance to discuss this result privately with patient 7/27).     Type 2 diabetes mellitus without complications      Well-managed PTA with metformin and semaglutide.   - Continue metformin   - Resume PTA semaglutide   - Accuchecks and medium sliding scale insulin       Hypertension goal BP (blood pressure) < 140/90    Started on lisinopril 2014 for 's/90's given has diabetes mellitus.     BP elevated 160's due to pain and stress.   - Continue lisinopril  - Pain control    Hyperlipidemia LDL goal <100  - Continue simvastatin    VERONA (obstructive sleep apnea)-AHI 71    Clinically Significant Risk Factors                   # Hypertension: Noted on problem list           # DMII: A1C = 6.6 % (Ref range: 0.0 - 5.6 %) within past 6 months, PRESENT ON ADMISSION  # Overweight: Estimated body mass index is 29.95 kg/m  as calculated from the following:    Height as of 7/23/25: 1.854 m (6' 1\").    Weight as of this encounter: 103 kg (227 lb)., PRESENT ON ADMISSION            Diet: Advance Diet as Tolerated: Regular Diet Adult    DVT Prophylaxis: Low Risk/Ambulatory with no VTE prophylaxis indicated  Corley Catheter: Not present  Lines: None     Cardiac Monitoring: None  Code Status: Full Code      Discussion/Disposition: Recovering post op. Awaiting cultures.     Medically Ready for Discharge: Anticipated in 2-4 Days         Medical Decision Making       40 MINUTES SPENT BY ME on the date of service doing chart review, history, exam, documentation & further activities per the note.        Sagar Hernandez, [facility]  Securely message with [employer] (more " info)  Text page via [employer] Paging/Directory     Interval History   No acute events overnight. Patient reports improvement in all of his joint pains and improvement in his left shoulder pain. No new joint pain, rashes, or visual changes. Reports intermittent headaches that are relieved with PRN's and improved from yesterday since the procedure. Still awaiting ID consult and further recommendations.     Physical Exam   Temp:  [97.3  F (36.3  C)-99.1  F (37.3  C)] 98.3  F (36.8  C)  Pulse:  [] 99  Resp:  [16-18] 16  BP: (132-162)/(79-97) 162/97  SpO2:  [94 %-97 %] 97 %    Weights:   Vitals:    07/26/25 0229   Weight: 103 kg (227 lb)    Body mass index is 29.95 kg/m .    Constitutional: awake though tired appearing post op, oriented, responds appropriately and asks appropriate questions.   Resp: CTAB  GI: Non-distended, non-tender, bowel sounds present  MSK: Left arm in sling    Data   Recent Labs   Lab 07/28/25  1115 07/28/25  0759 07/28/25  0517 07/28/25  0219 07/27/25  0834 07/27/25  0510 07/26/25  1651 07/26/25  0354 07/23/25  1000   WBC  --   --   --   --   --  10.2  --  11.5* 10.9   HGB  --   --  13.0*  --   --  13.5  --  13.8 14.9   MCV  --   --  89  --   --  90  --  88 89   PLT  --   --   --   --   --  282  --  240 213   NA  --   --   --   --   --  136  --  133* 137   POTASSIUM  --   --   --   --   --  4.5  --  4.4 5.0   CHLORIDE  --   --   --   --   --  98  --  98 101   CO2  --   --   --   --   --  23  --  21* 26   BUN  --   --   --   --   --  14.5  --  18.2 17.7   CR  --   --   --   --   --  0.74  --  0.76 0.89   ANIONGAP  --   --   --   --   --  15  --  14 10   ROOPA  --   --   --   --   --  9.6  --  9.5 9.8   * 138*  --  171*   < > 132*   < > 143* 147*   ALBUMIN  --   --   --   --   --   --   --   --  4.2   PROTTOTAL  --   --   --   --   --   --   --   --  7.2   BILITOTAL  --   --   --   --   --   --   --   --  0.5   ALKPHOS  --   --   --   --   --   --   --   --  46   ALT  --   --   --   --   --    --   --   --  20   AST  --   --   --   --   --   --   --   --  25    < > = values in this interval not displayed.       Recent Labs   Lab 07/28/25  1115 07/28/25  0759 07/28/25  0219 07/27/25  2121 07/27/25  1632 07/27/25  1153   * 138* 171* 122* 141* 131*        Unresulted Labs Ordered in the Past 30 Days of this Admission       Date and Time Order Name Status Description    7/27/2025  8:00 AM Tissue Aerobic Bacterial Culture Routine Preliminary     7/27/2025  8:00 AM Anaerobic Bacterial Culture Routine In process     7/27/2025  7:51 AM Tissue Aerobic Bacterial Culture Routine Preliminary     7/27/2025  7:51 AM Anaerobic Bacterial Culture Routine In process     7/26/2025  7:48 AM Blood Culture Peripheral blood (BC) Arm, Right Preliminary     7/26/2025  7:48 AM Blood Culture Peripheral blood (BC) Arm, Right Preliminary     7/26/2025  7:07 AM Synovial fluid Aerobic Bacterial Culture Routine With Gram Stain Preliminary              Imaging:   No results found for this or any previous visit (from the past 24 hours).       I reviewed all new labs and imaging results over the last 24 hours. I personally reviewed no images or EKG's today.    Medications   Current Facility-Administered Medications   Medication Dose Route Frequency Provider Last Rate Last Admin    lactated ringers infusion   Intravenous Continuous Jarvis Miles MD 75 mL/hr at 07/27/25 1600 Rate Verify at 07/27/25 1600     Current Facility-Administered Medications   Medication Dose Route Frequency Provider Last Rate Last Admin    acetaminophen (TYLENOL) tablet 975 mg  975 mg Oral Q8H Jarvis Miles MD   975 mg at 07/28/25 0934    aspirin EC tablet 81 mg  81 mg Oral Daily HemmilaElias MD   81 mg at 07/28/25 0935    cefTRIAXone (ROCEPHIN) 2 g vial to attach to  ml bag for ADULTS or NS 50 ml bag for PEDS  2 g Intravenous Q24H Jarvis Miles MD   2 g at 07/28/25 0936    doxycycline hyclate (VIBRAMYCIN) capsule 100  mg  100 mg Oral Q12H [facility] (08/20) Sagar Hernandez DO   100 mg at 07/28/25 1106    insulin aspart (NovoLOG) injection (RAPID ACTING)  2-7 Units Subcutaneous TID AC Jarvis Miles MD        insulin aspart (NovoLOG) injection (RAPID ACTING)  1-5 Units Subcutaneous At Bedtime Jarvis Miles MD        lisinopril (ZESTRIL) tablet 10 mg  10 mg Oral At Bedtime Jarvis Miles MD   10 mg at 07/27/25 2205    metFORMIN (GLUCOPHAGE) tablet 1,000 mg  1,000 mg Oral BID w/meals Hemmila, Elias Ortiz MD   1,000 mg at 07/28/25 0935    polyethylene glycol (MIRALAX) Packet 17 g  17 g Oral Daily Jarvis Miles MD   17 g at 07/28/25 0934    senna-docusate (SENOKOT-S/PERICOLACE) 8.6-50 MG per tablet 1 tablet  1 tablet Oral BID Jarvis Miles MD   1 tablet at 07/28/25 0935    simvastatin (ZOCOR) tablet 20 mg  20 mg Oral At Bedtime Jarvis Miles MD   20 mg at 07/27/25 2205    sodium chloride (PF) 0.9% PF flush 3 mL  3 mL Intracatheter Q8H [facility] Jarvis Miles MD   3 mL at 07/27/25 1456       Sagar Hernandez DO  [facility] Medicine       114

## 2025-07-28 NOTE — PROGRESS NOTES
[facility] Orthopaedics Progress Note      Post-operative Day: 1 Day Post-Op    Procedure(s):  IRRIGATION AND DEBRIDEMENT, SHOULDER  ARTHROSCOPY, SHOULDER, WITH DISTAL CLAVICLE EXCISION left   Subjective:    Pt reports improved pain this morning, it feels better than yesterday. His block is wearing off but he still has mild tingling in the thumb.     Chest pain, SOB:  no  Nausea, vomiting:  no  Lightheadedness, dizziness:  no  Neuro:  Patient denies new onset numbness or paresthesias      Objective:  Blood pressure 138/85, pulse 97, temperature 97.8  F (36.6  C), temperature source Oral, resp. rate 16, weight 103 kg (227 lb), SpO2 94%.    Patient Vitals for the past 24 hrs:   BP Temp Temp src Pulse Resp SpO2   07/28/25 0800 138/85 97.8  F (36.6  C) Oral 97 16 94 %   07/27/25 2123 (!) 140/79 97.3  F (36.3  C) Oral 86 16 94 %   07/27/25 1533 132/83 99.1  F (37.3  C) Oral 104 18 96 %   07/27/25 1153 136/79 -- -- 95 17 94 %   07/27/25 1035 133/75 -- -- 94 15 (!) 91 %   07/27/25 1002 -- -- -- 96 19 92 %   07/27/25 0950 127/76 -- -- 100 18 92 %       Wt Readings from Last 4 Encounters:   07/26/25 103 kg (227 lb)   07/23/25 103 kg (227 lb)   04/01/25 106.1 kg (234 lb)   12/27/24 111.6 kg (246 lb)     Gen: A&O x 3. NAD. Up to the recliner.   Wound status: Covered, post op gauze dressings are c/d/I.  Circulation, motion and sensation: Hand and wrist ROM intact and equal bilaterally; distal upper extremity sensation is intact and equal bilaterally. Fingers are warm and well perfused.   Swelling: mild  Calf tenderness: calves are soft and non-tender bilaterally     Pertinent Labs   Lab Results: personally reviewed.     Recent Labs   Lab Test 07/28/25  0517 07/27/25  0510 07/26/25  0354 07/23/25  1000   WBC  --  10.2 11.5* 10.9   HGB 13.0* 13.5 13.8 14.9   HCT  --  41.1 41.1 44.9   MCV 89 90 88 89   PLT  --  282 240 213   NA  --  136 133* 137       Plan:   Continue current cares and rehabilitation.  Anticoagulation protocol:  Mechanical/ambulation  Pain medications:  oxycodone and tylenol  Weight bearing status:  WBAT  Activities as tolerated the left shoulder. He may wear a sling for comfort but come out of the sling and slowly advance as pain allows.   Continue IV ceftriaxone, no growth to date on cultures. ID Consult placed per .   Disposition:  Discharge per medicine.              Report completed by:  Jersey Lowe PA-C  Date: 7/28/2025  Time: 9:35 AM

## 2025-07-28 NOTE — PROGRESS NOTES
07/28/25 0800   Appointment Info   Signing Clinician's Name / Credentials (OT) Viviane Orellana, OTR/L   Living Environment   People in Home spouse;other (see comments)  (2 cats)   Current Living Arrangements house  (3 level home)   Home Accessibility stairs to enter home;stairs within home   Number of Stairs, Main Entrance 4   Stair Railings, Main Entrance railings safe and in good condition   Number of Stairs, Within Home, Primary greater than 10 stairs   Stair Railings, Within Home, Primary railings safe and in good condition   Transportation Anticipated family or friend will provide   Living Environment Comments No concerns, pt has second BR & kitchen on top floor by bedroom   Self-Care   Usual Activity Tolerance moderate   Current Activity Tolerance moderate   Regular Exercise Yes   Activity/Exercise Type other (see comments)  (60 hours )   Exercise Amount/Frequency 3-5 times/wk   Equipment Currently Used at Home none   Fall history within last six months no   Activity/Exercise/Self-Care Comment Pt completes ADLs independently at baseline   Instrumental Activities of Daily Living (IADL)   Previous Responsibilities work;driving;finances;medication management;yardwork;shopping   IADL Comments Spouse assists with laundry   General Information   Onset of Illness/Injury or Date of Surgery 07/26/25   Referring Physician Dr. Jarvis Miles   Patient/Family Therapy Goal Statement (OT) Return home   Additional Occupational Profile Info/Pertinent History of Current Problem From H&P: Osmar Angel is a 55 year old male with past medical history significant for diabetes mellitus type 2, hypertension, hyperlipidemia, VERONA who now presents on 7/26/2025 with 5 days of progressive left shoulder pain. Pt received left shoulder surgery.   Existing Precautions/Restrictions shoulder   Cognitive Status Examination   Orientation Status orientation to person, place and time   Visual Perception   Visual  Impairment/Limitations WFL   Sensory   Sensory Quick Adds sensation intact   Pain Assessment   Patient Currently in Pain Yes, see Vital Sign flowsheet  (Head- 4/10, L shoulder- 4/10)   Posture   Posture not impaired   Range of Motion Comprehensive   General Range of Motion upper extremity range of motion deficits identified   General Upper Extremity Assessment (Range of Motion)   Upper Extremity: Range of Motion shoulder, left: UE ROM   Strength Comprehensive (MMT)   General Manual Muscle Testing (MMT) Assessment no strength deficits identified   Muscle Tone Assessment   Muscle Tone Quick Adds No deficits were identified   Bed Mobility   Bed Mobility No deficits identified   Transfers   Transfers sit-stand transfer   Sit-Stand Transfer   Sit-Stand Snyder (Transfers) supervision   Balance   Balance Assessment standing static balance;standing dynamic balance   Standing Balance: Static supervision   Standing Balance: Dynamic supervision   Position/Device Used, Standing Balance unsupported   Activities of Daily Living   BADL Assessment/Intervention no deficits identified;grooming   Grooming Assessment/Training   Snyder Level (Grooming) oral care regimen;supervision   Position (Grooming) unsupported standing   Clinical Impression   Criteria for Skilled Therapeutic Interventions Met (OT) Yes, treatment indicated   OT Diagnosis Decreased functional independence in LUE impacting ADLs   Influenced by the following impairments Pain, surgery   OT Problem List-Impairments impacting ADL problems related to;post-surgical precautions   ADL comments/analysis VC for safety/precautions, Supervision ADLs   Assessment of Occupational Performance 1-3 Performance Deficits   Identified Performance Deficits Dressing   Planned Therapy Interventions (OT) ADL retraining;home program guidelines   Intervention Comments OT educated pt and CG on role of IP OT and POC. Pt/CG agreed and evaluation completed. Treatment completed.    Clinical Decision Making Complexity (OT) problem focused assessment/low complexity   Risk & Benefits of therapy have been explained evaluation/treatment results reviewed;care plan/treatment goals reviewed;risks/benefits reviewed;current/potential barriers reviewed;participants voiced agreement with care plan;participants included;patient;spouse/significant other   Clinical Impression Comments Pt w/ reduced independence in ADLs   OT Total Evaluation Time   OT Eval, Low Complexity Minutes (76571) 15   OT Goals   Therapy Frequency (OT) One time eval and treatment   OT Predicted Duration/Target Date for Goal Attainment 07/29/25   OT Goals Hygiene/Grooming;OT Goal 1   OT: Hygiene/Grooming supervision/stand-by assist;Goal Met   OT: Goal 1 Pt will demo UE HEP w/ supervision to increase functional mobility and ROM for ADLs   Interventions   Interventions Quick Adds Self-Care/Home Management   Self-Care/Home Management   Self-Care/Home Mgmt/ADL, Compensatory, Meal Prep Minutes (25247) 10   Symptoms Noted During/After Treatment (Meal Preparation/Planning Training) none   Treatment Detail/Skilled Intervention OT educated pt and CG on shoulder precautions and how they impact ADLs/IADLs such as dressing and toileting. OT educated pt/CG on HEP with AROM exercises for UE, and instructed pt to perform 2-3x/day progressively. OT educated pt to perform wrist pumps every hour and administered a foam block for him to squeeze during exercise. OT reviewed how to don/doff sling with pt. Pt/CG demonstrated verbal understanding of education and pt was left seated in bed with needs in reach.   OT Discharge Planning   OT Plan Treat 1x   OT Discharge Recommendation (DC Rec) home   OT Rationale for DC Rec Pt lives with spouse assist, pt appears near functional baseline, pt agrees to follow HEP to increase UE ROM as shoulder heals   OT Brief overview of current status Supervision and VC for ADLs   OT Total Distance Amb During Session (feet) 25    Total Session Time   Timed Code Treatment Minutes 10   Total Session Time (sum of timed and untimed services) 25   Psychosocial Support   Trust Relationship/Rapport care explained;choices provided;questions encouraged

## 2025-07-29 VITALS
TEMPERATURE: 98 F | WEIGHT: 227 LBS | SYSTOLIC BLOOD PRESSURE: 155 MMHG | DIASTOLIC BLOOD PRESSURE: 92 MMHG | RESPIRATION RATE: 18 BRPM | OXYGEN SATURATION: 97 % | HEART RATE: 72 BPM | BODY MASS INDEX: 29.95 KG/M2

## 2025-07-29 LAB
CRP SERPL-MCNC: 52.55 MG/L
GLUCOSE BLDC GLUCOMTR-MCNC: 126 MG/DL (ref 70–99)
HGB BLD-MCNC: 12.7 G/DL (ref 13.3–17.7)
HOLD SPECIMEN: NORMAL
MCV RBC AUTO: 90 FL (ref 78–100)

## 2025-07-29 PROCEDURE — 36415 COLL VENOUS BLD VENIPUNCTURE: CPT | Performed by: ORTHOPAEDIC SURGERY

## 2025-07-29 PROCEDURE — 85018 HEMOGLOBIN: CPT | Performed by: ORTHOPAEDIC SURGERY

## 2025-07-29 PROCEDURE — 250N000013 HC RX MED GY IP 250 OP 250 PS 637: Performed by: ORTHOPAEDIC SURGERY

## 2025-07-29 PROCEDURE — 250N000011 HC RX IP 250 OP 636: Performed by: ORTHOPAEDIC SURGERY

## 2025-07-29 PROCEDURE — 86140 C-REACTIVE PROTEIN: CPT | Performed by: STUDENT IN AN ORGANIZED HEALTH CARE EDUCATION/TRAINING PROGRAM

## 2025-07-29 PROCEDURE — 99239 HOSP IP/OBS DSCHRG MGMT >30: CPT | Performed by: STUDENT IN AN ORGANIZED HEALTH CARE EDUCATION/TRAINING PROGRAM

## 2025-07-29 PROCEDURE — 250N000013 HC RX MED GY IP 250 OP 250 PS 637: Performed by: INTERNAL MEDICINE

## 2025-07-29 PROCEDURE — 250N000013 HC RX MED GY IP 250 OP 250 PS 637

## 2025-07-29 RX ORDER — AMOXICILLIN 250 MG
1 CAPSULE ORAL
Status: DISCONTINUED | OUTPATIENT
Start: 2025-07-29 | End: 2025-07-29 | Stop reason: HOSPADM

## 2025-07-29 RX ORDER — OXYCODONE HYDROCHLORIDE 10 MG/1
10 TABLET ORAL EVERY 6 HOURS PRN
Qty: 10 TABLET | Refills: 0 | Status: SHIPPED | OUTPATIENT
Start: 2025-07-29

## 2025-07-29 RX ADMIN — IBUPROFEN 600 MG: 600 TABLET ORAL at 08:57

## 2025-07-29 RX ADMIN — ASPIRIN 81 MG: 81 TABLET, DELAYED RELEASE ORAL at 08:53

## 2025-07-29 RX ADMIN — METFORMIN HYDROCHLORIDE 1000 MG: 500 TABLET ORAL at 08:53

## 2025-07-29 RX ADMIN — ACETAMINOPHEN 975 MG: 325 TABLET ORAL at 01:24

## 2025-07-29 RX ADMIN — CEFTRIAXONE 2 G: 2 INJECTION, POWDER, FOR SOLUTION INTRAMUSCULAR; INTRAVENOUS at 08:53

## 2025-07-29 RX ADMIN — ACETAMINOPHEN 975 MG: 325 TABLET ORAL at 10:50

## 2025-07-29 RX ADMIN — HYDROMORPHONE HYDROCHLORIDE 0.5 MG: 1 INJECTION, SOLUTION INTRAMUSCULAR; INTRAVENOUS; SUBCUTANEOUS at 01:24

## 2025-07-29 RX ADMIN — OXYCODONE HYDROCHLORIDE 10 MG: 5 TABLET ORAL at 04:37

## 2025-07-29 RX ADMIN — ACETAMINOPHEN, ASPIRIN, CAFFEINE 1 TABLET: 250; 65; 250 TABLET, FILM COATED ORAL at 04:37

## 2025-07-29 ASSESSMENT — ACTIVITIES OF DAILY LIVING (ADL)
ADLS_ACUITY_SCORE: 26

## 2025-07-29 NOTE — PROGRESS NOTES
[facility] DISCHARGE NOTE    Patient discharged to home at 12:15 PM via wheel chair. Accompanied by spouse and staff. Discharge instructions reviewed with patient and spouse, opportunity offered to ask questions. Prescriptions filled and sent with patient upon discharge. All belongings sent with patient.    Jere Nuñez RN

## 2025-07-29 NOTE — PROGRESS NOTES
[facility] Orthopaedics Progress Note      Post-operative Day: 2 Days Post-Op    Procedure(s):  IRRIGATION AND DEBRIDEMENT,  LEFT SHOULDER  ARTHROSCOPY, LEFT SHOULDER, WITH DISTAL CLAVICLE EXCISION  Subjective:    Pt reports increased aching in the left shoulder overnight which has improved. His main complaint currently is a headache.     Chest pain, SOB:  no  Nausea, vomiting:  no  Lightheadedness, dizziness:  no  Neuro:  Patient denies new onset numbness or paresthesias      Objective:  Blood pressure (!) 155/92, pulse 72, temperature 98  F (36.7  C), temperature source Oral, resp. rate 18, weight 103 kg (227 lb), SpO2 97%.    Patient Vitals for the past 24 hrs:   BP Temp Temp src Pulse Resp SpO2   07/29/25 0738 (!) 155/92 98  F (36.7  C) Oral 72 18 97 %   07/28/25 2243 (!) 150/83 98  F (36.7  C) Oral 92 18 96 %   07/28/25 1548 (!) 153/91 98.8  F (37.1  C) Oral 98 18 95 %   07/28/25 1117 (!) 162/97 98.3  F (36.8  C) Oral 99 16 97 %       Wt Readings from Last 4 Encounters:   07/26/25 103 kg (227 lb)   07/23/25 103 kg (227 lb)   04/01/25 106.1 kg (234 lb)   12/27/24 111.6 kg (246 lb)     Gen: A&O x 3. NAD. Up to the recliner.   Wound status: Covered. Sling in place  Circulation, motion and sensation: Hand and wrist ROM intact and equal bilaterally; distal upper extremity sensation is intact and equal bilaterally. Fingers are warm and well perfused.   Swelling: mild    Pertinent Labs   Lab Results: personally reviewed.     Recent Labs   Lab Test 07/29/25  0543 07/28/25  0517 07/27/25  0510 07/26/25  0354 07/23/25  1000   WBC  --   --  10.2 11.5* 10.9   HGB 12.7* 13.0* 13.5 13.8 14.9   HCT  --   --  41.1 41.1 44.9   MCV 90 89 90 88 89   PLT  --   --  282 240 213   NA  --   --  136 133* 137       Plan:   Continue current cares and rehabilitation.  Anticoagulation protocol: Mechanical/ambulation  Pain medications:  oxycodone and tylenol  Weight bearing status:  WBAT  Activities as tolerated the left shoulder. He may  wear a sling for comfort but come out of the sling and slowly advance as pain allows.   Continue antibiotics as per ID note; no growth to date on cultures.   Disposition: Orthopedically stable.  Discharge per medicine.            Report completed by:  Jersey Lowe PA-C  Date: 7/29/2025  Time: 8:45 AM

## 2025-07-29 NOTE — PLAN OF CARE
Problem: Adult Inpatient Plan of Care  Goal: Plan of Care Review  Description: The Plan of Care Review/Shift note should be completed every shift.  The Outcome Evaluation is a brief statement about your assessment that the patient is improving, declining, or no change.  This information will be displayed automatically on your shift  note.  Outcome: Progressing   Goal Outcome Evaluation:    Patient A/Ox4, able to make needs known. Moderate pain in left shoulder, relieved with 10mg oxy. Independent. 02 stable on ra. PIV-sl.

## 2025-07-29 NOTE — PLAN OF CARE
Goal Outcome Evaluation:      Plan of Care Reviewed With: patient    Overall Patient Progress: no changeOverall Patient Progress: no change     Assumed care of patient at 2300. Alert and oriented. Stand by assist in room with wife helping. Left shoulder in sling with ice off and on. Pain managed with oxycodone dilaudid , tylenol, and excedrin. Rates pain in shoulder 6/10 and rates headache 7/10.

## 2025-07-29 NOTE — DISCHARGE SUMMARY
"[facility]  Hospitalist Discharge Summary      Date of Admission:  7/26/2025  Date of Discharge:  7/29/2025  Discharging Provider: Sagar Sawant DO  Discharge Service: Hospitalist Service    Discharge Diagnoses   Acute left shoulder arthritis, suspect inflammatory secondary to supraspinatus tear   H/o GC/Chlamydia primary infection April 2025   Type 2 diabetes mellitus without complications   Hypertension goal BP (blood pressure) < 140/90   Hyperlipidemia LDL goal <100   VERONA     Clinically Significant Risk Factors     # DMII: A1C = 6.6 % (Ref range: 0.0 - 5.6 %) within past 6 months  # Overweight: Estimated body mass index is 29.95 kg/m  as calculated from the following:    Height as of 7/23/25: 1.854 m (6' 1\").    Weight as of this encounter: 103 kg (227 lb).       Follow-ups Needed After Discharge   Follow-up Appointments       Follow Up      Follow up with Dr.Erik Miles's team for your left shoulder washout surgery at 2 weeks post surgery. Call [facility] scheduling at [phone] to make an appointment. Call his team at [phone] for questions.        Hospital Follow-up with Existing Primary Care Provider (PCP)          Schedule Primary Care visit within: 7 Days             Unresulted Labs Ordered in the Past 30 Days of this Admission       Date and Time Order Name Status Description    7/27/2025  8:00 AM Tissue Aerobic Bacterial Culture Routine Preliminary     7/27/2025  8:00 AM Anaerobic Bacterial Culture Routine Preliminary     7/27/2025  7:51 AM Tissue Aerobic Bacterial Culture Routine Preliminary     7/27/2025  7:51 AM Anaerobic Bacterial Culture Routine Preliminary     7/26/2025  7:48 AM Blood Culture Peripheral blood (BC) Arm, Right Preliminary     7/26/2025  7:48 AM Blood Culture Peripheral blood (BC) Arm, Right Preliminary     7/26/2025  7:07 AM Synovial fluid Aerobic Bacterial Culture Routine With Gram Stain Preliminary         These results will be " followed up by PCP.    Discharge Disposition   Discharged to home  Condition at discharge: Stable    Hospital Course   Osmar Angel is a 55 year old male with past medical history significant for diabetes mellitus type 2, hypertension, hyperlipidemia, VERONA who now presents on 7/26/2025 with 5 days of progressive left shoulder pain.    Acute left shoulder arthritis, suspect inflammatory secondary to supraspinatus tear     Progressive severe pain with ROM of left shoulder over 5 days associated with multiple joint arthralgias, transient swelling of right wrist which resolved, malaise and generalized weakness without fever or chills. No known trauma. No gout history. Seen at OSH clinic/ED and treated empirically with doxycycline for possible Lyme disease (negative initial serology) and negative tick PCR panel. No improvement after 3 days of doxycycline. , WBC 11.5. CT left shoulder showed AC joint effusion with synovial enhancement. Arthrocentesis of the GH joint in the ED produced only a few drops of fluid for culture, not enough for also doing crystal analysis.     Orthopedic surgery recommended MRI of the shoulder which showed soft tissue inflammatory changes but no large effusion, and posterior supraspinatus tear. Orthopedic surgery consulted and given the exquisite pain with any ROM of the shoulder, recommended arthroscopic surgery for wash out in AM 7/27.     Brachial plexus block placed by CRNA with good analgesic effect, wore off prior to surgery.    DDx includes septic arthritis, inflammatory arthritis, and crystal induced arthritis. Urine GC PCR returned negative but may not rule out disseminated GC. Ceftriaxone 2g q 24h started on admission.     Initial aspirate from glenohumeral joint in ED showing negative Gram stain and NGTD.    Intraoperative samples sent for culture 7/27 AM and have remained negative for 48 hours. ID consulted 7/28 and recommended stopping antibiotics given low suspicion of  infectious etiology given imaging findings and intraoperative report. Blood Cx NGTD.  - Discontinued antibiotics, no indication to continue on dishcarge  - Follow up with Ortho in 2 weeks for post-op follow up  - Wound care and activity instructions provided per ortho  - PT referral placed for supraspinatus tear    H/o GC/Chlamydia primary infection April 2025    Treated with IM ceftriaxone and 7 days of doxycycline and symptoms resolved and have not returned. Sexual partner also treated. Per patient, wife was not exposed.     Urine GC/Chlamydia PCR negative this admission. (Have not had a chance to discuss this result privately with patient 7/27).     Type 2 diabetes mellitus without complications      Well-managed PTA with metformin and semaglutide.   - Continue metformin   - Continue PTA semaglutide       Hypertension goal BP (blood pressure) < 140/90    Started on lisinopril 2014 for 's/90's given has diabetes mellitus.     BP elevated 160's due to pain and stress.   - Continue lisinopril    Hyperlipidemia LDL goal <100  - Continue simvastatin    VERONA (obstructive sleep apnea)-AHI 71    Consultations This Hospital Stay   ORTHOPEDIC SURGERY IP CONSULT  OCCUPATIONAL THERAPY ADULT IP CONSULT  INFECTIOUS DISEASES IP CONSULT  PHYSICAL THERAPY ADULT IP CONSULT    Code Status   Full Code    Time Spent on this Encounter   ISagar DO, personally saw the patient today and spent greater than 30 minutes discharging this patient.       Sagar Sawant DO  [facility] MEDICAL SURGICAL  [address] [address]  Phone: [phone]  Fax: [phone]  ______________________________________________________________________    Physical Exam   Vital Signs: Temp: 98  F (36.7  C) Temp src: Oral BP: (!) 155/92 Pulse: 72   Resp: 18 SpO2: 97 % O2 Device: None (Room air)    Weight: 227 lbs 0 oz    Constitutional: awake though tired appearing post op, oriented, responds appropriately and asks  appropriate questions.   Resp: CTAB  GI: Non-distended, non-tender, bowel sounds present  MSK: Left arm in sling       Primary Care Physician   Milena Kelley    Discharge Orders      Physical Therapy  Referral      Follow Up    Follow up with Dr.Erik Miles's team for your left shoulder washout surgery at 2 weeks post surgery. Call [facility] scheduling at [phone] to make an appointment. Call his team at [phone] for questions.     Activity    Your activity upon discharge: Activities as tolerated the left shoulder. Sling for comfort but come out of the sling and slowly advance as pain allows     Wound care and dressings    Instructions to care for your wound at home: Remove bulky dressing on POD#3. Apply dry gauze dressings or Bandaids, ok to get wet when showering and change dressings as needed. Do not soak.     Reason for your hospital stay    Left shoulder arthroscopy     Activity    Your activity upon discharge: activity as tolerated     Diet    Follow this diet upon discharge:    Advance Diet as Tolerated: Regular Diet Adult     Hospital Follow-up with Existing Primary Care Provider (PCP)            Significant Results and Procedures   Most Recent 3 CBC's:  Recent Labs   Lab Test 07/29/25  0543 07/28/25  0517 07/27/25  0510 07/26/25  0354 07/23/25  1000   WBC  --   --  10.2 11.5* 10.9   HGB 12.7* 13.0* 13.5 13.8 14.9   MCV 90 89 90 88 89   PLT  --   --  282 240 213     Most Recent 3 BMP's:  Recent Labs   Lab Test 07/29/25  0740 07/28/25  2026 07/28/25  1644 07/27/25  0834 07/27/25  0510 07/26/25  1651 07/26/25  0354 07/23/25  1000   NA  --   --   --   --  136  --  133* 137   POTASSIUM  --   --   --   --  4.5  --  4.4 5.0   CHLORIDE  --   --   --   --  98  --  98 101   CO2  --   --   --   --  23  --  21* 26   BUN  --   --   --   --  14.5  --  18.2 17.7   CR  --   --   --   --  0.74  --  0.76 0.89   ANIONGAP  --   --   --   --  15  --  14 10   ROOPA  --   --   --   --  9.6  --   9.5 9.8   * 112* 164*   < > 132*   < > 143* 147*    < > = values in this interval not displayed.   ,   Results for orders placed or performed during the hospital encounter of 07/26/25   XR Shoulder Left 2 Views    Narrative    EXAM: XR SHOULDER LEFT 2 VIEWS  LOCATION: [facility]  DATE: 7/26/2025    INDICATION: severe pain in shoulder with localized swelling  COMPARISON: None.      Impression    IMPRESSION: Normal joint spaces and alignment. No fracture.   CT Shoulder Left w Contrast    Narrative    EXAM: CT SHOULDER LEFT W CONTRAST  LOCATION: [facility]  DATE: 7/26/2025    INDICATION: Pain out of proportion around the left shoulder. Shoulder pain for about 5 days without any known antecedent trauma. Abnormally elevated, rising CRP level.  COMPARISON: None.  TECHNIQUE: IV contrast. Axial, sagittal and coronal thin-section reconstruction. Dose reduction techniques were used.   CONTRAST: 90mL Isovue 370    FINDINGS:  There is distention and synovial enhancement at acromioclavicular joint with subcutaneous edema over the cephalad aspect of the shoulder centered around the acromioclavicular joint and superficial to the normal-appearing distal trapezius and proximal   deltoid muscles. There is minimal acromioclavicular joint degenerative change but no demineralization or erosion. No soft tissue gas or focal fluid collection. No glenohumeral or subacromial bursal effusion.    The rotator cuff and other shoulder musculature appears normal. The scapula, humerus, and other visualized bones of the upper left thoracic cage appear normal.    No lymphadenopathy or soft tissue gas. The visualized left lung is clear. Moderately severe left main coronary artery calcification.      Impression    IMPRESSION:  1.  Acromioclavicular joint effusion with synovial enhancement and overlying soft tissue edema. This may represent septic arthritis or inflammatory arthritis. There  is no evidence of osteomyelitis or abscess.  2.  Coronary artery atheromatous calcification.       MR Shoulder Left w/o & w Contrast    Narrative    EXAM: MR SHOULDER LEFT W/O and W CONTRAST  LOCATION: [facility]  DATE: 7/26/2025    INDICATION: Acute monoarthritis.  COMPARISON: None.  TECHNIQUE: Routine. Additional postgadolinium T1 sequences were obtained.  IV CONTRAST: 10 mL Gadavist    FINDINGS:    ROTATOR CUFF:  -Supraspinatus: Full-thickness tearing within the supraspinatus extends into the infraspinatus junction. The area of full-thickness tearing measures on the order of 2.1 cm with 1.7 cm of retraction. No significant atrophy.  -Infraspinatus: As stated, the area of full-thickness tearing extends to the anterior margin of the infraspinatus. There is tendinopathy within the remainder of the tendon attachment. No retraction or atrophy.  -Subscapularis: Mild to moderate severity tendinopathy without tearing. No retraction or atrophy.  -Teres minor: No tendon tear, tendinopathy or fatty atrophy.    CORACOACROMIAL ARCH:  -Morphology: Type II acromion. No subacromial spur. Subacromial and subcoracoid space are normal.   -Bursa: Trace fluid in the subacromial-subdeltoid bursa. The coracoacromial and coracoclavicular ligaments are negative.    ACROMIOCLAVICULAR JOINT:   -Minimal diastasis of the AC joint interval. Mild hypertrophic change. Mild reactive edema distal clavicle.     LONG HEAD OF BICEPS TENDON:   -No tendinopathy or tear. No tenosynovitis or subluxation.    GLENOHUMERAL JOINT:   -Labrum: No labral tear. No paralabral cyst.   -Cartilage: Mild grade II cartilage thinning both sides of the joint.   -Joint space: No effusion or synovitis.  -Glenohumeral ligaments and capsule: No pericapsular inflammation.    BONES:   -No fracture or concerning marrow replacing lesion.    SOFT TISSUES:   -There is considerable T2 signal abnormality within the subcutaneous soft tissues superior to  the distal clavicle and AC joint as well as posterior to the acromion. This could be due to direct impact injury/contusion or reactive. There is also signal   abnormality within the anterior deltoid which could be due to muscle strain.      Impression    IMPRESSION:  1.  Full-thickness tear of the central to posterior supraspinatus extending into the infraspinatus junction. The full-thickness tear measures approximately 2.1 cm in AP dimension with 1.7 cm of retraction but no significant atrophy.  2.  Mild to moderate severity subscapularis tendinopathy without tearing.  3.  Tendinopathy within the remainder of the infraspinatus tendon.  4.  Minimal diastasis of the AC joint interval with mild hypertrophic change and reactive edema distal clavicle. This could be secondary to a more acute AC separation-type injury. The coracoacromial and coracoclavicular ligaments remain intact, but there   is considerable surrounding soft tissue edema and/or inflammation within the distal trapezius and subcutaneous soft tissues superior to the distal clavicle and AC joint.  5.  Separate area of signal abnormality within the anterior deltoid could be due to direct impact injury or muscle strain.  6.  No evidence for displaced fracture.  7.  No evidence for labral tear.  8.  Mild grade II cartilage loss both sides of the glenohumeral joint.   POC US GUIDANCE NEEDLE PLACEMENT    Impression    normal   POC US GUIDANCE NEEDLE PLACEMENT    Impression    Normal anatomy        Discharge Medications      Review of your medicines        CHANGE how you take these medications        Dose / Directions   oxyCODONE IR 10 MG tablet  Commonly known as: ROXICODONE  This may have changed:   medication strength  how much to take  reasons to take this  Used for: Monoarthritis, shoulder region, left      Dose: 10 mg  Take 1 tablet (10 mg) by mouth every 6 hours as needed for severe pain.  Quantity: 10 tablet  Refills: 0            CONTINUE these medicines  which have NOT CHANGED        Dose / Directions   Accu-Chek SmartView test strip  Generic drug: blood glucose      USE TO TEST BLOOD SUGAR 3 TIMES DAILY  Quantity: 300 strip  Refills: 0     acetaminophen 500 MG tablet  Commonly known as: TYLENOL      Dose: 1,000 mg  Take 1,000 mg by mouth every 4 hours.  Refills: 0     aspirin 81 MG tablet  Commonly known as: ASA      Dose: 1 tablet  Take 1 tablet by mouth daily.  Refills: 0     blood glucose monitoring lancets      USE AS DIRECTED 3 TIMES DAILY  Quantity: 300 each  Refills: 0     CINNAMON PO      Dose: 2 capsule  Take 2 capsules by mouth daily.  Refills: 0     FreeStyle Osiel 3 Plus Sensor Misc      Change every 15 days.  Quantity: 6 each  Refills: 1     ibuprofen 200 MG tablet  Commonly known as: ADVIL/MOTRIN      Dose: 400 mg  Take 400 mg by mouth every 6 hours.  Refills: 0     lisinopril 10 MG tablet  Commonly known as: ZESTRIL  Used for: Hypertension goal BP (blood pressure) < 140/90      Dose: 10 mg  Take 1 tablet (10 mg) by mouth daily  Quantity: 90 tablet  Refills: 3     metFORMIN 1000 MG tablet  Commonly known as: GLUCOPHAGE      Dose: 1,000 mg  Take 1 tablet (1,000 mg) by mouth 2 times daily (with meals).  Quantity: 360 tablet  Refills: 1     OMEGA-3 FISH OIL PO      Dose: 1 g  Take 1 g by mouth every evening.  Refills: 0     order for DME      Equipment being ordered: CPAP Osmar Angel  received a [employer] DreamStation Auto CPAP Auto. Pressures were set at Auto 11 - 15 cm H2O.  Refills: 0     Semaglutide (2 MG/DOSE) 8 MG/3ML pen  Commonly known as: OZEMPIC      Dose: 2 mg  Inject 2 mg subcutaneously every 7 days.  Quantity: 9 mL  Refills: 3     sildenafil 20 MG tablet  Commonly known as: REVATIO  Used for: Impotence of organic origin      TAKE 1 TO 3 TABLETS BY MOUTH 30 MINUTES TO 4 HOURS BEFORE INTERCOURSE AS NEEDED  Quantity: 50 tablet  Refills: 0     simvastatin 20 MG tablet  Commonly known as: ZOCOR  Used for: Hyperlipidemia LDL goal  <70      Dose: 20 mg  Take 1 tablet (20 mg) by mouth at bedtime  Quantity: 90 tablet  Refills: 3     VITAMIN D (CHOLECALCIFEROL) PO      Dose: 10,000 Units  Take 10,000 Units by mouth every evening.  Refills: 0            STOP taking      doxycycline hyclate 100 MG capsule  Commonly known as: VIBRAMYCIN                  Where to get your medicines        These medications were sent to [facility] [place], [place] - [address]  [address] [address]      Phone: [phone]   oxyCODONE IR 10 MG tablet       Allergies   Allergies   Allergen Reactions    Wellbutrin [Bupropion Hcl] Swelling

## 2025-07-30 ENCOUNTER — PATIENT OUTREACH (OUTPATIENT)
Dept: CARE COORDINATION | Facility: CLINIC | Age: 55
End: 2025-07-30
Payer: COMMERCIAL

## 2025-07-30 NOTE — PROGRESS NOTES
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 I'm good. I have all of my meds and I have my questions answered at the moment.\"  How are your symptoms? (Red Flag symptoms escalate to triage hotline per guidelines): Improved  Do you know how to contact your clinic care team if you have future questions or changes to your health status? : Yes  Does the patient have their discharge instructions? : Yes  Does the patient have questions regarding their discharge instructions? : No  Were you started on any new medications or were there changes to any of your previous medications? : Yes (Change to existing medication)  Does the patient have all of their medications?: Yes  Do you have questions regarding any of your medications? : No  Do you have all of your needed medical supplies or equipment (DME)?  (i.e. oxygen tank, CPAP, cane, etc.): Yes    Post-op ([facility] CTA Only)  If the patient had a surgery or procedure, do they have any questions for a nurse?: No         [facility] Explained and offered Care Coordination support to eligible patients: Yes    Patient accepted? No    Follow up Plan     Discharge Follow-Up  Discharge follow up appointment scheduled in alignment with recommended follow up timeframe or Transitions of Risk Category? (Low = within 30 days; Moderate= within 14 days; High= within 7 " days): Yes  Discharge Follow Up Appointment Date: 08/06/25  Discharge Follow Up Appointment Scheduled with?: Primary Care Provider    Future Appointments   Date Time Provider Department Center   8/6/2025  3:30 PM Milena Kelley, DO [facility]       Outpatient Plan as outlined on AVS reviewed with patient.    For any urgent concerns, please contact our 24 hour nurse triage line: [phone] ([phone])       KAYLENE Zepeda  [phone]  Connected Care Resource [facility]

## 2025-07-31 LAB
BACTERIA SNV CULT: NO GROWTH
BACTERIA SPEC CULT: NO GROWTH
BACTERIA SPEC CULT: NO GROWTH
BACTERIA TISS BX CULT: NORMAL
GRAM STAIN RESULT: NORMAL
GRAM STAIN RESULT: NORMAL

## 2025-08-03 LAB — BACTERIA TISS BX CULT: NORMAL

## 2025-08-04 LAB — BACTERIA TISS BX CULT: ABNORMAL

## 2025-08-06 ENCOUNTER — RESULTS FOLLOW-UP (OUTPATIENT)
Dept: FAMILY MEDICINE | Facility: CLINIC | Age: 55
End: 2025-08-06

## 2025-08-06 ENCOUNTER — MYC REFILL (OUTPATIENT)
Dept: FAMILY MEDICINE | Facility: CLINIC | Age: 55
End: 2025-08-06

## 2025-08-06 ENCOUNTER — OFFICE VISIT (OUTPATIENT)
Dept: FAMILY MEDICINE | Facility: CLINIC | Age: 55
End: 2025-08-06
Attending: STUDENT IN AN ORGANIZED HEALTH CARE EDUCATION/TRAINING PROGRAM
Payer: COMMERCIAL

## 2025-08-06 VITALS
TEMPERATURE: 97.6 F | HEART RATE: 104 BPM | HEIGHT: 73 IN | WEIGHT: 225.8 LBS | OXYGEN SATURATION: 98 % | DIASTOLIC BLOOD PRESSURE: 80 MMHG | BODY MASS INDEX: 29.93 KG/M2 | RESPIRATION RATE: 16 BRPM | SYSTOLIC BLOOD PRESSURE: 116 MMHG

## 2025-08-06 DIAGNOSIS — I10 HYPERTENSION GOAL BP (BLOOD PRESSURE) < 140/90: ICD-10-CM

## 2025-08-06 DIAGNOSIS — E78.5 HYPERLIPIDEMIA LDL GOAL <70: ICD-10-CM

## 2025-08-06 DIAGNOSIS — M25.412: Primary | ICD-10-CM

## 2025-08-06 LAB
ALBUMIN SERPL BCG-MCNC: 4.3 G/DL (ref 3.5–5.2)
ALP SERPL-CCNC: 59 U/L (ref 40–150)
ALT SERPL W P-5'-P-CCNC: 26 U/L (ref 0–70)
ANION GAP SERPL CALCULATED.3IONS-SCNC: 13 MMOL/L (ref 7–15)
AST SERPL W P-5'-P-CCNC: 19 U/L (ref 0–45)
BILIRUB SERPL-MCNC: 0.3 MG/DL
BUN SERPL-MCNC: 21.6 MG/DL (ref 6–20)
CALCIUM SERPL-MCNC: 9.9 MG/DL (ref 8.8–10.4)
CHLORIDE SERPL-SCNC: 100 MMOL/L (ref 98–107)
CREAT SERPL-MCNC: 0.98 MG/DL (ref 0.67–1.17)
CRP SERPL-MCNC: <3 MG/L
EGFRCR SERPLBLD CKD-EPI 2021: >90 ML/MIN/1.73M2
ERYTHROCYTE [DISTWIDTH] IN BLOOD BY AUTOMATED COUNT: 13.1 % (ref 10–15)
ERYTHROCYTE [SEDIMENTATION RATE] IN BLOOD BY WESTERGREN METHOD: 9 MM/HR (ref 0–20)
GLUCOSE SERPL-MCNC: 129 MG/DL (ref 70–99)
HCO3 SERPL-SCNC: 24 MMOL/L (ref 22–29)
HCT VFR BLD AUTO: 45.3 % (ref 40–53)
HGB BLD-MCNC: 14.8 G/DL (ref 13.3–17.7)
HOLD SPECIMEN: NORMAL
MCH RBC QN AUTO: 28.8 PG (ref 26.5–33)
MCHC RBC AUTO-ENTMCNC: 32.7 G/DL (ref 31.5–36.5)
MCV RBC AUTO: 88 FL (ref 78–100)
PLATELET # BLD AUTO: 391 10E3/UL (ref 150–450)
POTASSIUM SERPL-SCNC: 4.6 MMOL/L (ref 3.4–5.3)
PROT SERPL-MCNC: 7.5 G/DL (ref 6.4–8.3)
RBC # BLD AUTO: 5.14 10E6/UL (ref 4.4–5.9)
SODIUM SERPL-SCNC: 137 MMOL/L (ref 135–145)
WBC # BLD AUTO: 10.4 10E3/UL (ref 4–11)

## 2025-08-06 PROCEDURE — 99213 OFFICE O/P EST LOW 20 MIN: CPT | Performed by: FAMILY MEDICINE

## 2025-08-06 PROCEDURE — 3079F DIAST BP 80-89 MM HG: CPT | Performed by: FAMILY MEDICINE

## 2025-08-06 PROCEDURE — 86618 LYME DISEASE ANTIBODY: CPT | Performed by: FAMILY MEDICINE

## 2025-08-06 PROCEDURE — 36415 COLL VENOUS BLD VENIPUNCTURE: CPT | Performed by: FAMILY MEDICINE

## 2025-08-06 PROCEDURE — 85652 RBC SED RATE AUTOMATED: CPT | Performed by: FAMILY MEDICINE

## 2025-08-06 PROCEDURE — 86140 C-REACTIVE PROTEIN: CPT | Performed by: FAMILY MEDICINE

## 2025-08-06 PROCEDURE — 85027 COMPLETE CBC AUTOMATED: CPT | Performed by: FAMILY MEDICINE

## 2025-08-06 PROCEDURE — 1126F AMNT PAIN NOTED NONE PRSNT: CPT | Performed by: FAMILY MEDICINE

## 2025-08-06 PROCEDURE — 3074F SYST BP LT 130 MM HG: CPT | Performed by: FAMILY MEDICINE

## 2025-08-06 PROCEDURE — 80053 COMPREHEN METABOLIC PANEL: CPT | Performed by: FAMILY MEDICINE

## 2025-08-06 ASSESSMENT — PAIN SCALES - GENERAL: PAINLEVEL_OUTOF10: NO PAIN (0)

## 2025-08-07 DIAGNOSIS — E78.5 HYPERLIPIDEMIA LDL GOAL <70: ICD-10-CM

## 2025-08-07 DIAGNOSIS — I10 HYPERTENSION GOAL BP (BLOOD PRESSURE) < 140/90: ICD-10-CM

## 2025-08-07 LAB — B BURGDOR IGG+IGM SER QL: 0.12

## 2025-08-07 RX ORDER — SIMVASTATIN 20 MG
20 TABLET ORAL AT BEDTIME
Qty: 90 TABLET | Refills: 3 | OUTPATIENT
Start: 2025-08-07

## 2025-08-07 RX ORDER — LISINOPRIL 10 MG/1
10 TABLET ORAL DAILY
Qty: 90 TABLET | Refills: 3 | Status: CANCELLED | OUTPATIENT
Start: 2025-08-07

## 2025-08-07 RX ORDER — LISINOPRIL 10 MG/1
10 TABLET ORAL DAILY
Qty: 90 TABLET | Refills: 0 | OUTPATIENT
Start: 2025-08-07

## 2025-08-07 RX ORDER — LISINOPRIL 10 MG/1
10 TABLET ORAL DAILY
Qty: 90 TABLET | Refills: 1 | Status: SHIPPED | OUTPATIENT
Start: 2025-08-07

## 2025-08-07 RX ORDER — SIMVASTATIN 20 MG
20 TABLET ORAL AT BEDTIME
Qty: 90 TABLET | Refills: 1 | Status: SHIPPED | OUTPATIENT
Start: 2025-08-07

## 2025-08-07 RX ORDER — SIMVASTATIN 20 MG
20 TABLET ORAL AT BEDTIME
Qty: 90 TABLET | Refills: 3 | Status: CANCELLED | OUTPATIENT
Start: 2025-08-07

## 2025-08-07 RX ORDER — SIMVASTATIN 20 MG
20 TABLET ORAL AT BEDTIME
Qty: 90 TABLET | Refills: 0 | OUTPATIENT
Start: 2025-08-07

## (undated) DEVICE — SU ETHIBOND 0 CT-2 30" X412H

## (undated) DEVICE — DRSG ADAPTIC 3X8" 6113

## (undated) DEVICE — SU MONOCRYL 2-0 CT-1 36" UND Y945H

## (undated) DEVICE — TUBING ARTHROSCOPY PUMP ARTHREX AR-6410

## (undated) DEVICE — DRSG ADAPTIC 3X8" X3

## (undated) DEVICE — SUCTION MANIFOLD NEPTUNE 2 SYS 4 PORT 0702-020-000

## (undated) DEVICE — TAPE MEDIPORE 4"X10YD 2964

## (undated) DEVICE — DRAPE ARTHROSCOPY SHOULDER BEACHCHAIR 29369

## (undated) DEVICE — IMM KIT SHOULDER TMAX MASK FACE 7210559

## (undated) DEVICE — SU VICRYL 0 CT-1 36" J946H

## (undated) DEVICE — GLOVE BIOGEL PI MICRO INDICATOR UNDERGLOVE SZ 8.0 48980

## (undated) DEVICE — SLING ARM MED 79-99155

## (undated) DEVICE — PREP CHLORAPREP 26ML TINTED ORANGE  260815

## (undated) DEVICE — SUCTION TIP POOLE K770

## (undated) DEVICE — GLOVE BIOGEL PI MICRO SZ 8.0 48580

## (undated) DEVICE — PACK SHOULDER

## (undated) DEVICE — GLOVE BIOGEL PI MICRO INDICATOR UNDERGLOVE SZ 6.5 48965

## (undated) DEVICE — BUR ARTHREX COOLCUT SABRE 4.0MMX13CM AR-8400SR

## (undated) DEVICE — SU ETHILON 3-0 PS-2 18" 1669H

## (undated) DEVICE — BLADE SAW OSCILLATING STRYK MED 9.0X25X0.38MM 2296-003-111

## (undated) DEVICE — SU MONOCRYL 3-0 PS-2 18" UND Y497G

## (undated) DEVICE — GOWN LG DISP 9515

## (undated) DEVICE — GOWN IMPERVIOUS SPECIALTY XLG/XLONG 32474

## (undated) DEVICE — ABLATOR ARTHREX APOLLO RF MP90 ASPIRATING 90DEG AR-9811

## (undated) DEVICE — GLOVE BIOGEL PI MICRO SZ 6.5 48565

## (undated) DEVICE — ESU PENCIL SMOKE EVAC W/ROCKER SWITCH 0703-047-000

## (undated) DEVICE — SLING ARM LG 79-99157

## (undated) RX ORDER — ONDANSETRON 2 MG/ML
INJECTION INTRAMUSCULAR; INTRAVENOUS
Status: DISPENSED
Start: 2025-07-27

## (undated) RX ORDER — BUPIVACAINE HYDROCHLORIDE 5 MG/ML
INJECTION, SOLUTION EPIDURAL; INTRACAUDAL; PERINEURAL
Status: DISPENSED
Start: 2025-07-27

## (undated) RX ORDER — KETOROLAC TROMETHAMINE 30 MG/ML
INJECTION, SOLUTION INTRAMUSCULAR; INTRAVENOUS
Status: DISPENSED
Start: 2025-07-27

## (undated) RX ORDER — FENTANYL CITRATE-0.9 % NACL/PF 10 MCG/ML
PLASTIC BAG, INJECTION (ML) INTRAVENOUS
Status: DISPENSED
Start: 2025-07-27

## (undated) RX ORDER — CEFAZOLIN SODIUM/WATER 2 G/20 ML
SYRINGE (ML) INTRAVENOUS
Status: DISPENSED
Start: 2025-07-27

## (undated) RX ORDER — PROPOFOL 10 MG/ML
INJECTION, EMULSION INTRAVENOUS
Status: DISPENSED
Start: 2025-07-27

## (undated) RX ORDER — BUPIVACAINE HYDROCHLORIDE AND EPINEPHRINE 5; 5 MG/ML; UG/ML
INJECTION, SOLUTION EPIDURAL; INTRACAUDAL; PERINEURAL
Status: DISPENSED
Start: 2025-07-26